# Patient Record
Sex: MALE | Race: WHITE | Employment: FULL TIME | URBAN - NONMETROPOLITAN AREA
[De-identification: names, ages, dates, MRNs, and addresses within clinical notes are randomized per-mention and may not be internally consistent; named-entity substitution may affect disease eponyms.]

---

## 2017-01-03 DIAGNOSIS — E11.9 TYPE 2 DIABETES MELLITUS WITHOUT COMPLICATION, UNSPECIFIED LONG TERM INSULIN USE STATUS: Primary | ICD-10-CM

## 2017-01-03 RX ORDER — GLIPIZIDE 5 MG/1
TABLET ORAL
Qty: 60 TABLET | Refills: 3 | Status: SHIPPED | OUTPATIENT
Start: 2017-01-03 | End: 2017-05-16

## 2017-01-03 NOTE — TELEPHONE ENCOUNTER
glipiZIDE (GLUCOTROL) 5 MG tablet         Last Written Prescription Date: 8/29/16  Last Fill Quantity: 60, # refills: 3  Last Office Visit with [facility], P or [facility] prescribing provider:  7/1/16   Next 5 appointments (look out 90 days)     Jan 11, 2017  7:00 AM   Office Visit with Bill Panda MD   [facility] ([facility])    [address] [facility] [id number] [phone]                   BP Readings from Last 3 Encounters:   07/01/16 122/80   05/18/16 128/78   11/09/15 108/82     MICROL       13   5/18/2016  No results found for this basename: microalbumin  CREATININE   Date Value Ref Range Status   05/18/2016 0.73 0.66 - 1.25 mg/dL Final   ]  GFR ESTIMATE   Date Value Ref Range Status   05/18/2016 >90  Non  GFR Calc   >60 mL/min/1.7m2 Final   12/30/2014 >90  Non  GFR Calc   >60 mL/min/1.7m2 Final   01/06/2014 70 >60 mL/min/1.7m2 Final     GFR ESTIMATE IF BLACK   Date Value Ref Range Status   05/18/2016 >90   GFR Calc   >60 mL/min/1.7m2 Final   12/30/2014 >90   GFR Calc   >60 mL/min/1.7m2 Final   01/06/2014 85 >60 mL/min/1.7m2 Final     CHOL      209   3/30/2009  HDL       29   3/30/2009  LDL      122   5/18/2016  LDL      145   3/30/2009  TRIG      174   3/30/2009  CHOLHDLRATIO      7.0   3/30/2009  AST       12   5/18/2016  ALT       52   5/18/2016  A1C      9.7   5/18/2016  A1C      7.0   6/9/2015  A1C      7.8   12/30/2014  A1C      8.6   8/1/2014  A1C      6.9   1/6/2014  POTASSIUM   Date Value Ref Range Status   05/18/2016 4.2 3.4 - 5.3 mmol/L Final

## 2017-01-03 NOTE — TELEPHONE ENCOUNTER
Routing refill request to provider for review/approval because:  Labs out of range:  LDL  Labs not current:  A1C    Lillian Harper RN  [facility]

## 2017-01-10 DIAGNOSIS — E11.9 TYPE 2 DIABETES MELLITUS WITHOUT COMPLICATION, WITHOUT LONG-TERM CURRENT USE OF INSULIN (H): Primary | ICD-10-CM

## 2017-01-11 ENCOUNTER — OFFICE VISIT (OUTPATIENT)
Dept: FAMILY MEDICINE | Facility: OTHER | Age: 52
End: 2017-01-11
Payer: COMMERCIAL

## 2017-01-11 VITALS
BODY MASS INDEX: 32.61 KG/M2 | OXYGEN SATURATION: 99 % | DIASTOLIC BLOOD PRESSURE: 72 MMHG | WEIGHT: 220.2 LBS | TEMPERATURE: 97.8 F | SYSTOLIC BLOOD PRESSURE: 114 MMHG | HEIGHT: 69 IN | RESPIRATION RATE: 18 BRPM | HEART RATE: 91 BPM

## 2017-01-11 DIAGNOSIS — E11.9 TYPE 2 DIABETES MELLITUS WITHOUT COMPLICATION, WITHOUT LONG-TERM CURRENT USE OF INSULIN (H): Primary | ICD-10-CM

## 2017-01-11 DIAGNOSIS — E78.5 HYPERLIPIDEMIA LDL GOAL <100: ICD-10-CM

## 2017-01-11 LAB — HBA1C MFR BLD: 6 % (ref 4.3–6)

## 2017-01-11 PROCEDURE — 99213 OFFICE O/P EST LOW 20 MIN: CPT | Performed by: FAMILY MEDICINE

## 2017-01-11 PROCEDURE — 36415 COLL VENOUS BLD VENIPUNCTURE: CPT | Performed by: FAMILY MEDICINE

## 2017-01-11 PROCEDURE — 99207 C FOOT EXAM  NO CHARGE: CPT | Performed by: FAMILY MEDICINE

## 2017-01-11 PROCEDURE — 83036 HEMOGLOBIN GLYCOSYLATED A1C: CPT | Performed by: FAMILY MEDICINE

## 2017-01-11 ASSESSMENT — PAIN SCALES - GENERAL: PAINLEVEL: NO PAIN (0)

## 2017-01-11 NOTE — PROGRESS NOTES
SUBJECTIVE:  Zeus Gaona, a 51-year-old man with diabetes, is in for followup.  He denies any symptoms or problems.  He feels his blood sugars have been acceptable.  He is on glipizide 5 mg twice daily, but he does volunteer that he really has been only taking it once today.  He is also on maximum Metformin 1000 mg twice daily.  He did have a colonoscopy a year or so ago which did show adenomatous polyp and he understands that should be repeated on a 5-year interim.  His LDL last spring was 122.      PLAN:  I suggest he return again in 2017 at which time we will repeat an A1c and lipids.  His A1c today comes back excellent at 6.  He will stay on his current medications which really would be once a day glipizide.  We will notify him of other lab results as they become available.         ZACKERY NG M.D.             D: 2017 10:43   T: 2017 12:30   MT:       Name:     ZEUS GAONA   MRN:      [id number]        Account:      [id number]   :      1965           Visit Date:   2017      Document: [id number]

## 2017-01-11 NOTE — NURSING NOTE
"Chief Complaint   Patient presents with     Diabetes     Lipids       Initial /72 mmHg  Pulse 91  Temp(Src) 97.8  F (36.6  C) (Temporal)  Resp 18  Ht 5' 8.75\" (1.746 m)  Wt 220 lb 3.2 oz (99.882 kg)  BMI 32.76 kg/m2  SpO2 99% Estimated body mass index is 32.76 kg/(m^2) as calculated from the following:    Height as of this encounter: 5' 8.75\" (1.746 m).    Weight as of this encounter: 220 lb 3.2 oz (99.882 kg).  BP completed using cuff size: large    "

## 2017-01-11 NOTE — MR AVS SNAPSHOT
"              After Visit Summary   2017    Tim Gaona    MRN: [id number]           Patient Information     Date Of Birth          1965        Visit Information        Provider Department      2017 7:00 AM Bill Panda MD [facility]        Today's Diagnoses     Type 2 diabetes mellitus without complication, without long-term current use of insulin (H)    -  1     Hyperlipidemia LDL goal <100            Follow-ups after your visit        Who to contact     If you have questions or need follow up information about today's clinic visit or your schedule please contact [facility] directly at [phone].  Normal or non-critical lab and imaging results will be communicated to you by [employer], letter or phone within 4 business days after the clinic has received the results. If you do not hear from us within 7 days, please contact the clinic through [employer] or phone. If you have a critical or abnormal lab result, we will notify you by phone as soon as possible.  Submit refill requests through [employer] or call your pharmacy and they will forward the refill request to us. Please allow 3 business days for your refill to be completed.          Additional Information About Your Visit        MyChart Information     [employer] lets you send messages to your doctor, view your test results, renew your prescriptions, schedule appointments and more. To sign up, go to [email or web address] . Click on \"Log in\" on the left side of the screen, which will take you to the Welcome page. Then click on \"Sign up Now\" on the right side of the page.     You will be asked to enter the access code listed below, as well as some personal information. Please follow the directions to create your username and password.     Your access code is: [email or web address]  Expires: 2017  8:10 AM     Your access code will  in 90 days. If you need help or a new code, please call your [facility] or " "[phone].        Care EveryWhere ID     This is your Care EveryWhere ID. This could be used by other organizations to access your [place] medical records  [id number]        Your Vitals Were     Pulse Temperature Respirations Height BMI (Body Mass Index) Pulse Oximetry    91 97.8  F (36.6  C) (Temporal) 18 5' 8.75\" (1.746 m) 32.76 kg/m2 99%       Blood Pressure from Last 3 Encounters:   01/11/17 114/72   07/01/16 122/80   05/18/16 128/78    Weight from Last 3 Encounters:   01/11/17 220 lb 3.2 oz (99.882 kg)   07/01/16 210 lb 0.8 oz (95.278 kg)   05/18/16 214 lb 1.6 oz (97.115 kg)              We Performed the Following     Hemoglobin A1c        Primary Care Provider Office Phone # Fax #    Bill Panda [phone]       [place] [address] [facility] [address]        Thank you!     Thank you for choosing [facility]  for your care. Our goal is always to provide you with excellent care. Hearing back from our patients is one way we can continue to improve our services. Please take a few minutes to complete the written survey that you may receive in the mail after your visit with us. Thank you!             Your Updated Medication List - Protect others around you: Learn how to safely use, store and throw away your medicines at www.disposemymeds.org.          This list is accurate as of: 1/11/17  8:10 AM.  Always use your most recent med list.                   Brand Name Dispense Instructions for use    ASPIRIN PO      Take 81 mg by mouth daily       blood glucose monitoring lancets     1 Box    Use to test blood sugar one time daily or as directed.       blood glucose monitoring test strip    no brand specified    100 strip    Use to test blood sugars one time daily or as directed       glipiZIDE 5 MG tablet    GLUCOTROL    60 tablet    TAKE ONE TABLET BY MOUTH TWICE A DAY BEFORE MEALS       metFORMIN 1000 MG tablet    GLUCOPHAGE    180 tablet    TAKE " ONE TABLET BY MOUTH TWICE A DAY WITH MEALS (NEED OFFICE VISIT SCHEDULED BEFORE FURTHER REFILLS)       nicotine 21 MG/24HR 24 hr patch    NICODERM CQ    30 patch    Place 1 patch onto the skin every 24 hours       STATIN NOT PRESCRIBED (INTENTIONAL)      1 each See Admin Instructions       VITAMIN B COMPLEX PO      Take 1 tablet by mouth daily       Vitamin C 500 MG Caps      Take 1 capsule by mouth daily       vitamin E 400 UNIT capsule      Take 1 capsule by mouth daily

## 2017-01-11 NOTE — PROGRESS NOTES
"SUBJECTIVE:                                                    Tim Gaona is a 51 year old male who presents to clinic today for the following health issues:    Diabetes Follow-up    Patient is checking blood sugars: once daily.  Results are as follows:         130    Diabetic concerns: None     Symptoms of hypoglycemia (low blood sugar): none     Paresthesias (numbness or burning in feet) or sores: No     Date of last diabetic eye exam: not sure     Hyperlipidemia Follow-Up      Rate your low fat/cholesterol diet?: fair    Taking statin?  No    Other lipid medications/supplements?:  none       Amount of exercise or physical activity: 2-3 days/week for an average of 15-30 minutes    Problems taking medications regularly: No    Medication side effects: none    Diet: low fat/cholesterol diabetic    Problem list and histories reviewed & adjusted, as indicated.    C: NEGATIVE for fever, chills, change in weight  E/M: NEGATIVE for ear, mouth and throat problems  R: NEGATIVE for significant cough or SOB  CV: NEGATIVE for chest pain, palpitations or peripheral edema    OBJECTIVE:                                                    /72 mmHg  Pulse 91  Temp(Src) 97.8  F (36.6  C) (Temporal)  Resp 18  Ht 5' 8.75\" (1.746 m)  Wt 220 lb 3.2 oz (99.882 kg)  BMI 32.76 kg/m2  SpO2 99%  Body mass index is 32.76 kg/(m^2).    See dictated note     ASSESSMENT/PLAN:                                                        Bill Panda MD, MD  [facility]          "

## 2017-01-31 DIAGNOSIS — E11.9 TYPE 2 DIABETES MELLITUS WITHOUT COMPLICATION, WITHOUT LONG-TERM CURRENT USE OF INSULIN (H): Primary | ICD-10-CM

## 2017-01-31 NOTE — TELEPHONE ENCOUNTER
Glipizide         Last Written Prescription Date: 1/3/17  Last Fill Quantity: 60, # refills: 3  Last Office Visit with G, P or [facility] prescribing provider:  1/11/17        BP Readings from Last 3 Encounters:   01/11/17 114/72   07/01/16 122/80   05/18/16 128/78     MICROL       13   5/18/2016  No results found for this basename: microalbumin  CREATININE   Date Value Ref Range Status   05/18/2016 0.73 0.66 - 1.25 mg/dL Final   ]  GFR ESTIMATE   Date Value Ref Range Status   05/18/2016 >90  Non  GFR Calc   >60 mL/min/1.7m2 Final   12/30/2014 >90  Non  GFR Calc   >60 mL/min/1.7m2 Final   01/06/2014 70 >60 mL/min/1.7m2 Final     GFR ESTIMATE IF BLACK   Date Value Ref Range Status   05/18/2016 >90   GFR Calc   >60 mL/min/1.7m2 Final   12/30/2014 >90   GFR Calc   >60 mL/min/1.7m2 Final   01/06/2014 85 >60 mL/min/1.7m2 Final     CHOL      209   3/30/2009  HDL       29   3/30/2009  LDL      122   5/18/2016  LDL      145   3/30/2009  TRIG      174   3/30/2009  CHOLHDLRATIO      7.0   3/30/2009  AST       12   5/18/2016  ALT       52   5/18/2016  A1C      6.0   1/11/2017  A1C      9.7   5/18/2016  A1C      7.0   6/9/2015  A1C      7.8   12/30/2014  A1C      8.6   8/1/2014  POTASSIUM   Date Value Ref Range Status   05/18/2016 4.2 3.4 - 5.3 mmol/L Final   Luzmaria Ledezma MA     1/31/2017

## 2017-02-02 RX ORDER — GLIPIZIDE 5 MG/1
TABLET ORAL
Qty: 60 TABLET | Refills: 3 | OUTPATIENT
Start: 2017-02-02

## 2017-02-02 NOTE — TELEPHONE ENCOUNTER
Prescription was sent 1/3/17 for #60 with 3 refills.  Pharmacy notified via E-Prescribe refusal.     Lillian Harper RN  [facility]

## 2017-05-16 DIAGNOSIS — E11.9 TYPE 2 DIABETES MELLITUS WITHOUT COMPLICATION, UNSPECIFIED LONG TERM INSULIN USE STATUS: ICD-10-CM

## 2017-05-17 RX ORDER — GLIPIZIDE 5 MG/1
TABLET ORAL
Qty: 60 TABLET | Refills: 3 | Status: SHIPPED | OUTPATIENT
Start: 2017-05-17 | End: 2017-09-11

## 2017-05-17 NOTE — TELEPHONE ENCOUNTER
Routing refill request to provider for review/approval because:  Labs out of range:  LDL    Lillian Harper, RN  [facility]

## 2017-05-17 NOTE — TELEPHONE ENCOUNTER
Glipizide         Last Written Prescription Date: 1/3/17  Last Fill Quantity: 60, # refills: 3  Last Office Visit with G, P or [facility] prescribing provider:  1/11/17        BP Readings from Last 3 Encounters:   01/11/17 114/72   07/01/16 122/80   05/18/16 128/78     Lab Results   Component Value Date    MICROL 13 05/18/2016     Lab Results   Component Value Date    UMALCR 10.75 05/18/2016     Creatinine   Date Value Ref Range Status   05/18/2016 0.73 0.66 - 1.25 mg/dL Final   ]  GFR Estimate   Date Value Ref Range Status   05/18/2016 >90  Non  GFR Calc   >60 mL/min/1.7m2 Final   12/30/2014 >90  Non  GFR Calc   >60 mL/min/1.7m2 Final   01/06/2014 70 >60 mL/min/1.7m2 Final     GFR Estimate If Black   Date Value Ref Range Status   05/18/2016 >90   GFR Calc   >60 mL/min/1.7m2 Final   12/30/2014 >90   GFR Calc   >60 mL/min/1.7m2 Final   01/06/2014 85 >60 mL/min/1.7m2 Final     Lab Results   Component Value Date    CHOL 209 03/30/2009     Lab Results   Component Value Date    HDL 29 03/30/2009     Lab Results   Component Value Date     05/18/2016     03/30/2009     Lab Results   Component Value Date    TRIG 174 03/30/2009     Lab Results   Component Value Date    CHOLHDLRATIO 7.0 03/30/2009     Lab Results   Component Value Date    AST 12 05/18/2016     Lab Results   Component Value Date    ALT 52 05/18/2016     Lab Results   Component Value Date    A1C 6.0 01/11/2017    A1C 9.7 05/18/2016    A1C 7.0 06/09/2015    A1C 7.8 12/30/2014    A1C 8.6 08/01/2014     Potassium   Date Value Ref Range Status   05/18/2016 4.2 3.4 - 5.3 mmol/L Final

## 2017-05-31 DIAGNOSIS — E11.9 CONTROLLED TYPE 2 DIABETES MELLITUS WITHOUT COMPLICATION, WITHOUT LONG-TERM CURRENT USE OF INSULIN (H): Primary | ICD-10-CM

## 2017-06-01 NOTE — TELEPHONE ENCOUNTER
Metformin          Last Written Prescription Date: 5/18/2016  Last Fill Quantity: 180, # refills: 3  Last Office Visit with [facility] or [facility] prescribing provider:  1/11/2017        BP Readings from Last 3 Encounters:   01/11/17 114/72   07/01/16 122/80   05/18/16 128/78     Lab Results   Component Value Date    MICROL 13 05/18/2016     Lab Results   Component Value Date    UMALCR 10.75 05/18/2016     Creatinine   Date Value Ref Range Status   05/18/2016 0.73 0.66 - 1.25 mg/dL Final   ]  GFR Estimate   Date Value Ref Range Status   05/18/2016 >90  Non  GFR Calc   >60 mL/min/1.7m2 Final   12/30/2014 >90  Non  GFR Calc   >60 mL/min/1.7m2 Final   01/06/2014 70 >60 mL/min/1.7m2 Final     GFR Estimate If Black   Date Value Ref Range Status   05/18/2016 >90   GFR Calc   >60 mL/min/1.7m2 Final   12/30/2014 >90   GFR Calc   >60 mL/min/1.7m2 Final   01/06/2014 85 >60 mL/min/1.7m2 Final     Lab Results   Component Value Date    CHOL 209 03/30/2009     Lab Results   Component Value Date    HDL 29 03/30/2009     Lab Results   Component Value Date     05/18/2016     03/30/2009     Lab Results   Component Value Date    TRIG 174 03/30/2009     Lab Results   Component Value Date    CHOLHDLRATIO 7.0 03/30/2009     Lab Results   Component Value Date    AST 12 05/18/2016     Lab Results   Component Value Date    ALT 52 05/18/2016     Lab Results   Component Value Date    A1C 6.0 01/11/2017    A1C 9.7 05/18/2016    A1C 7.0 06/09/2015    A1C 7.8 12/30/2014    A1C 8.6 08/01/2014     Potassium   Date Value Ref Range Status   05/18/2016 4.2 3.4 - 5.3 mmol/L Final

## 2017-06-02 NOTE — TELEPHONE ENCOUNTER
Metformin  Routing refill request to provider for review/approval because:  Labs out of range:  LDL  Labs not current:  Creatinine, GFR,  LDL, Microalbumin--future labs ordered  Patient needs to be seen because:  Per last OV 1/11/17, pt was to return in May.  No f/up done--routing to schedulers also    Felipe Monterroso RN, BSN

## 2017-06-05 ENCOUNTER — OFFICE VISIT (OUTPATIENT)
Dept: FAMILY MEDICINE | Facility: OTHER | Age: 52
End: 2017-06-05
Payer: COMMERCIAL

## 2017-06-05 VITALS
TEMPERATURE: 97.1 F | HEART RATE: 88 BPM | BODY MASS INDEX: 33.05 KG/M2 | SYSTOLIC BLOOD PRESSURE: 124 MMHG | RESPIRATION RATE: 16 BRPM | WEIGHT: 222.2 LBS | DIASTOLIC BLOOD PRESSURE: 68 MMHG | OXYGEN SATURATION: 98 %

## 2017-06-05 DIAGNOSIS — E11.9 CONTROLLED TYPE 2 DIABETES MELLITUS WITHOUT COMPLICATION, WITHOUT LONG-TERM CURRENT USE OF INSULIN (H): Primary | ICD-10-CM

## 2017-06-05 PROCEDURE — 99213 OFFICE O/P EST LOW 20 MIN: CPT | Performed by: FAMILY MEDICINE

## 2017-06-05 ASSESSMENT — PAIN SCALES - GENERAL: PAINLEVEL: SEVERE PAIN (6)

## 2017-06-05 NOTE — PROGRESS NOTES
SUBJECTIVE:                                                    Tim Gaona is a 52 year old male who presents to clinic today for the following health issues:    Diabetes Follow-up      Patient is checking blood sugars: normally twice a day but he has not done it for the last three weeks because he lost his meter    Diabetic concerns: None     Symptoms of hypoglycemia (low blood sugar): none     Paresthesias (numbness or burning in feet) or sores: No     Date of last diabetic eye exam: never had one     Hyperlipidemia Follow-Up      Rate your low fat/cholesterol diet?: fair    Taking statin?  No    Other lipid medications/supplements?:  none       Amount of exercise or physical activity: 2-3 days/week for an average of greater than 60 minutes    Problems taking medications regularly: No    Medication side effects: none    Diet: carbohydrate counting    Problem list and histories reviewed & adjusted, as indicated.  Additional history: none    Patient Active Problem List   Diagnosis     Hyperlipidemia LDL goal <100     Obesity, Class I, BMI 30-34.9     Tobacco use disorder     Controlled type 2 diabetes mellitus without complication, without long-term current use of insulin (H)     Past Surgical History:   Procedure Laterality Date     HC REMOVE TONSILS/ADENOIDS,<11 Y/O  1971    T & A <12y.o.       Social History   Substance Use Topics     Smoking status: Current Every Day Smoker     Packs/day: 0.75     Years: 32.00     Types: Cigarettes     Smokeless tobacco: Never Used      Comment: started age 20     Alcohol use No     Family History   Problem Relation Age of Onset     Thyroid Disease Mother      HEART DISEASE Father      Lipids Father      Thyroid Disease Paternal Grandmother      Thyroid Disease Paternal Grandfather            Reviewed and updated as needed this visit by clinical staff  Tobacco  Allergies  Meds  Soc Hx      Reviewed and updated as needed this visit by Provider         ROS:  C: NEGATIVE  for fever, chills, change in weight  E/M: NEGATIVE for ear, mouth and throat problems  R: NEGATIVE for significant cough or SOB  CV: NEGATIVE for chest pain, palpitations or peripheral edema    OBJECTIVE:                                                    /68 (BP Location: Right arm, Patient Position: Chair, Cuff Size: Adult Regular)  Pulse 88  Temp 97.1  F (36.2  C) (Tympanic)  Resp 16  Wt 222 lb 3.2 oz (100.8 kg)  SpO2 98%  BMI 33.05 kg/m2  Body mass index is 33.05 kg/(m^2).      Diagnostic Test Results:     ASSESSMENT/PLAN:                                                      dictated              Blil Panda MD, MD  [facility]

## 2017-06-05 NOTE — MR AVS SNAPSHOT
"              After Visit Summary   6/5/2017    Tim Gaona    MRN: [id number]           Patient Information     Date Of Birth          1965        Visit Information        Provider Department      6/5/2017 4:00 PM Bill Panda MD [facility]        Today's Diagnoses     Controlled type 2 diabetes mellitus without complication, without long-term current use of insulin (H)    -  1       Follow-ups after your visit        Future tests that were ordered for you today     Open Future Orders        Priority Expected Expires Ordered    **A1C FUTURE 3mo Routine 9/3/2017 10/3/2017 6/5/2017            Who to contact     If you have questions or need follow up information about today's clinic visit or your schedule please contact [facility] directly at [phone].  Normal or non-critical lab and imaging results will be communicated to you by MyChart, letter or phone within 4 business days after the clinic has received the results. If you do not hear from us within 7 days, please contact the clinic through MyChart or phone. If you have a critical or abnormal lab result, we will notify you by phone as soon as possible.  Submit refill requests through [facility] or call your pharmacy and they will forward the refill request to us. Please allow 3 business days for your refill to be completed.          Additional Information About Your Visit        [employer] Information     [facility] lets you send messages to your doctor, view your test results, renew your prescriptions, schedule appointments and more. To sign up, go to [email or web address] . Click on \"Log in\" on the left side of the screen, which will take you to the Welcome page. Then click on \"Sign up Now\" on the right side of the page.     You will be asked to enter the access code listed below, as well as some personal information. Please follow the directions to create your username and password.     Your access code is: " [email or web address]  Expires: 9/3/2017  5:34 PM     Your access code will  in 90 days. If you need help or a new code, please call your [facility] or [phone].        Care EveryWhere ID     This is your Care EveryWhere ID. This could be used by other organizations to access your [place] medical records  [id number]        Your Vitals Were     Pulse Temperature Respirations Pulse Oximetry BMI (Body Mass Index)       88 97.1  F (36.2  C) (Tympanic) 16 98% 33.05 kg/m2        Blood Pressure from Last 3 Encounters:   17 124/68   17 114/72   16 122/80    Weight from Last 3 Encounters:   17 222 lb 3.2 oz (100.8 kg)   17 220 lb 3.2 oz (99.9 kg)   16 210 lb 0.8 oz (95.3 kg)                 Today's Medication Changes          These changes are accurate as of: 17  5:34 PM.  If you have any questions, ask your nurse or doctor.               Start taking these medicines.        Dose/Directions    blood glucose monitoring meter device kit   Commonly known as:  no brand specified   Used for:  Controlled type 2 diabetes mellitus without complication, without long-term current use of insulin (H)   Started by:  Bill Panda MD        Use to test blood sugar 2 times daily or as directed.   Quantity:  1 kit   Refills:  0            Where to get your medicines      These medications were sent to [address] [place], [place] - [address] [place] [id number]     Phone:  [phone]     blood glucose monitoring meter device kit                Primary Care Provider Office Phone # Fax #    Bill Panda [phone]       [facility] [address] [facility] [address]        Thank you!     Thank you for choosing [facility]  for your care. Our goal is always to provide you with excellent care. Hearing back from our patients is one way we can continue to improve our services. Please take a few minutes to complete  the written survey that you may receive in the mail after your visit with us. Thank you!             Your Updated Medication List - Protect others around you: Learn how to safely use, store and throw away your medicines at www.disposemymeds.org.          This list is accurate as of: 6/5/17  5:34 PM.  Always use your most recent med list.                   Brand Name Dispense Instructions for use    ASPIRIN PO      Take 81 mg by mouth daily       blood glucose monitoring lancets     1 Box    Use to test blood sugar one time daily or as directed.       blood glucose monitoring meter device kit    no brand specified    1 kit    Use to test blood sugar 2 times daily or as directed.       blood glucose monitoring test strip    no brand specified    100 strip    Use to test blood sugars one time daily or as directed       glipiZIDE 5 MG tablet    GLUCOTROL    60 tablet    TAKE ONE TABLET BY MOUTH TWICE A DAY BEFORE MEALS       metFORMIN 1000 MG tablet    GLUCOPHAGE    180 tablet    TAKE ONE TABLET BY MOUTH TWICE A DAY WITH MEALS       nicotine 21 MG/24HR 24 hr patch    NICODERM CQ    30 patch    Place 1 patch onto the skin every 24 hours       STATIN NOT PRESCRIBED (INTENTIONAL)      1 each See Admin Instructions       VITAMIN B COMPLEX PO      Take 1 tablet by mouth daily       Vitamin C 500 MG Caps      Take 1 capsule by mouth daily       vitamin E 400 UNIT capsule      Take 1 capsule by mouth daily

## 2017-06-05 NOTE — NURSING NOTE
"Chief Complaint   Patient presents with     Diabetes     Lipids       Initial /68 (BP Location: Right arm, Patient Position: Chair, Cuff Size: Adult Regular)  Pulse 88  Temp 97.1  F (36.2  C) (Tympanic)  Resp 16  Wt 222 lb 3.2 oz (100.8 kg)  SpO2 98%  BMI 33.05 kg/m2 Estimated body mass index is 33.05 kg/(m^2) as calculated from the following:    Height as of 1/11/17: 5' 8.75\" (1.746 m).    Weight as of this encounter: 222 lb 3.2 oz (100.8 kg).  Medication Reconciliation: complete     Health Maintenance Due   Topic Date Due     PNEUMOVAX 1X HI RISK PATIENT < 65 (NO IB MSG)  04/13/1967     HEPATITIS C SCREENING  04/13/1983     CREATININE Q1 YEAR  05/18/2017     LIPID MONITORING Q1 YEAR  05/18/2017     MICROALBUMIN Q1 YEAR  05/18/2017     Leilani Montemayor CMA      "

## 2017-06-06 NOTE — PROGRESS NOTES
SUBJECTIVE:  Zeus Gaona, 52-year-old man in for followup of his diabetes.  Unfortunately, he continues to smoke.  His only complaint has been a litle sinus type pressure and drainage over the last month or so, I recommend some guaifenesin.  He says he has lost his Accu-Chek machine, it has been old, has had it for a long time.  He does have strips and lancets, will reorder Accu-Chek hopefully his insurance will cover it.  In the meantime, he is due for some labs, although his A1c is not due for another month or 2, he agrees to come in fasting in a month and we will do labs including his A1c.      OBJECTIVE:  His heart is regular.  Lungs are clear.  No carotid bruits.  He looks healthy.      PLAN:  Will stay on current treatments.         ZACKERY NG M.D.             D: 2017 17:33   T: 2017 09:14   MT: [id number]      Name:     ZEUS GAONA   MRN:      [id number]        Account:      [id number]   :      1965           Visit Date:   2017      Document: [id number]

## 2017-09-11 DIAGNOSIS — E11.9 TYPE 2 DIABETES MELLITUS WITHOUT COMPLICATION, UNSPECIFIED LONG TERM INSULIN USE STATUS: ICD-10-CM

## 2017-09-12 RX ORDER — GLIPIZIDE 5 MG/1
TABLET ORAL
Qty: 60 TABLET | Refills: 3 | Status: SHIPPED | OUTPATIENT
Start: 2017-09-12 | End: 2018-01-22

## 2017-09-12 NOTE — TELEPHONE ENCOUNTER
Glipizide         Last Written Prescription Date: 5/17/17  Last Fill Quantity: 60, # refills: 3  Last Office Visit with G, [facility] or [facility] prescribing provider:  6/5/17        BP Readings from Last 3 Encounters:   06/05/17 124/68   01/11/17 114/72   07/01/16 122/80     Lab Results   Component Value Date    MICROL 13 05/18/2016     Lab Results   Component Value Date    UMALCR 10.75 05/18/2016     Creatinine   Date Value Ref Range Status   05/18/2016 0.73 0.66 - 1.25 mg/dL Final   ]  GFR Estimate   Date Value Ref Range Status   05/18/2016 >90  Non  GFR Calc   >60 mL/min/1.7m2 Final   12/30/2014 >90  Non  GFR Calc   >60 mL/min/1.7m2 Final   01/06/2014 70 >60 mL/min/1.7m2 Final     GFR Estimate If Black   Date Value Ref Range Status   05/18/2016 >90   GFR Calc   >60 mL/min/1.7m2 Final   12/30/2014 >90   GFR Calc   >60 mL/min/1.7m2 Final   01/06/2014 85 >60 mL/min/1.7m2 Final     Lab Results   Component Value Date    CHOL 209 03/30/2009     Lab Results   Component Value Date    HDL 29 03/30/2009     Lab Results   Component Value Date     05/18/2016     03/30/2009     Lab Results   Component Value Date    TRIG 174 03/30/2009     Lab Results   Component Value Date    CHOLHDLRATIO 7.0 03/30/2009     Lab Results   Component Value Date    AST 12 05/18/2016     Lab Results   Component Value Date    ALT 52 05/18/2016     Lab Results   Component Value Date    A1C 6.0 01/11/2017    A1C 9.7 05/18/2016    A1C 7.0 06/09/2015    A1C 7.8 12/30/2014    A1C 8.6 08/01/2014     Potassium   Date Value Ref Range Status   05/18/2016 4.2 3.4 - 5.3 mmol/L Final

## 2017-09-12 NOTE — TELEPHONE ENCOUNTER
Routing refill request to provider for review/approval because:  Labs out of range:  LDL  Labs not current:  Creatinine, LDL, Microalbumin. A1C    Lillian Harper RN  [facility]

## 2017-09-20 ENCOUNTER — OFFICE VISIT (OUTPATIENT)
Dept: FAMILY MEDICINE | Facility: OTHER | Age: 52
End: 2017-09-20
Payer: COMMERCIAL

## 2017-09-20 VITALS
DIASTOLIC BLOOD PRESSURE: 82 MMHG | HEART RATE: 76 BPM | TEMPERATURE: 97.8 F | OXYGEN SATURATION: 100 % | BODY MASS INDEX: 33.86 KG/M2 | RESPIRATION RATE: 16 BRPM | HEIGHT: 68 IN | WEIGHT: 223.4 LBS | SYSTOLIC BLOOD PRESSURE: 115 MMHG

## 2017-09-20 DIAGNOSIS — E11.9 CONTROLLED TYPE 2 DIABETES MELLITUS WITHOUT COMPLICATION, WITHOUT LONG-TERM CURRENT USE OF INSULIN (H): ICD-10-CM

## 2017-09-20 DIAGNOSIS — E78.5 HYPERLIPIDEMIA LDL GOAL <100: Primary | ICD-10-CM

## 2017-09-20 DIAGNOSIS — Z11.59 ENCOUNTER FOR HEPATITIS C SCREENING TEST FOR LOW RISK PATIENT: ICD-10-CM

## 2017-09-20 LAB
ALBUMIN SERPL-MCNC: 3.8 G/DL (ref 3.4–5)
ALP SERPL-CCNC: 74 U/L (ref 40–150)
ALT SERPL W P-5'-P-CCNC: 23 U/L (ref 0–70)
ANION GAP SERPL CALCULATED.3IONS-SCNC: 12 MMOL/L (ref 3–14)
AST SERPL W P-5'-P-CCNC: 9 U/L (ref 0–45)
BILIRUB SERPL-MCNC: 0.4 MG/DL (ref 0.2–1.3)
BUN SERPL-MCNC: 11 MG/DL (ref 7–30)
CALCIUM SERPL-MCNC: 8.6 MG/DL (ref 8.5–10.1)
CHLORIDE SERPL-SCNC: 106 MMOL/L (ref 94–109)
CHOLEST SERPL-MCNC: 179 MG/DL
CO2 SERPL-SCNC: 24 MMOL/L (ref 20–32)
CREAT SERPL-MCNC: 0.73 MG/DL (ref 0.66–1.25)
CREAT UR-MCNC: 66 MG/DL
GFR SERPL CREATININE-BSD FRML MDRD: >90 ML/MIN/1.7M2
GLUCOSE SERPL-MCNC: 199 MG/DL (ref 70–99)
HBA1C MFR BLD: 6.5 % (ref 4.3–6)
HDLC SERPL-MCNC: 35 MG/DL
LDLC SERPL CALC-MCNC: 116 MG/DL
MICROALBUMIN UR-MCNC: <5 MG/L
MICROALBUMIN/CREAT UR: NORMAL MG/G CR (ref 0–17)
NONHDLC SERPL-MCNC: 144 MG/DL
POTASSIUM SERPL-SCNC: 4.1 MMOL/L (ref 3.4–5.3)
PROT SERPL-MCNC: 7 G/DL (ref 6.8–8.8)
SODIUM SERPL-SCNC: 142 MMOL/L (ref 133–144)
TRIGL SERPL-MCNC: 142 MG/DL
TSH SERPL DL<=0.005 MIU/L-ACNC: 1.88 MU/L (ref 0.4–4)

## 2017-09-20 PROCEDURE — 86803 HEPATITIS C AB TEST: CPT | Performed by: FAMILY MEDICINE

## 2017-09-20 PROCEDURE — 80053 COMPREHEN METABOLIC PANEL: CPT | Performed by: FAMILY MEDICINE

## 2017-09-20 PROCEDURE — 36415 COLL VENOUS BLD VENIPUNCTURE: CPT | Performed by: FAMILY MEDICINE

## 2017-09-20 PROCEDURE — 82043 UR ALBUMIN QUANTITATIVE: CPT | Performed by: FAMILY MEDICINE

## 2017-09-20 PROCEDURE — 99214 OFFICE O/P EST MOD 30 MIN: CPT | Performed by: FAMILY MEDICINE

## 2017-09-20 PROCEDURE — 84443 ASSAY THYROID STIM HORMONE: CPT | Performed by: FAMILY MEDICINE

## 2017-09-20 PROCEDURE — 80061 LIPID PANEL: CPT | Performed by: FAMILY MEDICINE

## 2017-09-20 PROCEDURE — 83036 HEMOGLOBIN GLYCOSYLATED A1C: CPT | Performed by: FAMILY MEDICINE

## 2017-09-20 ASSESSMENT — PAIN SCALES - GENERAL: PAINLEVEL: NO PAIN (0)

## 2017-09-20 NOTE — PROGRESS NOTES
"SUBJECTIVE:                                                    Tim Gaona is a 52 year old male who presents to clinic today for the following health issues:    Diabetes Follow-up    Patient is checking blood sugars: twice daily. Per patient   Blood sugar testing frequency justification: per patient, just does  Results are as follows:       am - 130-140       5pm /8pm  100-120        Diabetic concerns: None     Symptoms of hypoglycemia (low blood sugar): none     Paresthesias (numbness or burning in feet) or sores: No   Date of last diabetic eye exam: none  Hyperlipidemia Follow-Up      Rate your low fat/cholesterol diet?: good    Taking statin?  No    Other lipid medications/supplements?:  none          Amount of exercise or physical activity: 2-3 days/week for an average of 45-60 minutes    Problems taking medications regularly: No    Medication side effects: none  Diet: low fat/cholesterol and carbohydrate counting      Problem list and histories reviewed & adjusted, as indicated.    C: NEGATIVE for fever, chills, change in weight  E/M: NEGATIVE for ear, mouth and throat problems  R: NEGATIVE for significant cough or SOB  CV: NEGATIVE for chest pain, palpitations or peripheral edema    OBJECTIVE:                                                    /82 (BP Location: Right arm, Patient Position: Chair, Cuff Size: Adult Large)  Pulse 76  Temp 97.8  F (36.6  C) (Temporal)  Resp 16  Ht 5' 8\" (1.727 m)  Wt 223 lb 6.4 oz (101.3 kg)  SpO2 100%  BMI 33.97 kg/m2  Body mass index is 33.97 kg/(m^2).         ASSESSMENT/PLAN:                                                    Diabetes,controlled    Bill Panda MD, MD  [facility]          "

## 2017-09-20 NOTE — LETTER
September 21, 2017      Tim Gaona  [address]     [place] [facility] [place] [address]        Dear ,    We are writing to inform you of your test results.    Your test results fall within the expected range(s) or remain unchanged from previous results.  Please continue with current treatment plan. Your Hepatitis C Screening results are still pending.     Resulted Orders   Comprehensive metabolic panel   Result Value Ref Range    Sodium 142 133 - 144 mmol/L    Potassium 4.1 3.4 - 5.3 mmol/L    Chloride 106 94 - 109 mmol/L    Carbon Dioxide 24 20 - 32 mmol/L    Anion Gap 12 3 - 14 mmol/L    Glucose 199 (H) 70 - 99 mg/dL      Comment:      Non Fasting    Urea Nitrogen 11 7 - 30 mg/dL    Creatinine 0.73 0.66 - 1.25 mg/dL    GFR Estimate >90 >60 mL/min/1.7m2      Comment:      Non  GFR Calc    GFR Estimate If Black >90 >60 mL/min/1.7m2      Comment:       GFR Calc    Calcium 8.6 8.5 - 10.1 mg/dL    Bilirubin Total 0.4 0.2 - 1.3 mg/dL    Albumin 3.8 3.4 - 5.0 g/dL    Protein Total 7.0 6.8 - 8.8 g/dL    Alkaline Phosphatase 74 40 - 150 U/L    ALT 23 0 - 70 U/L    AST 9 0 - 45 U/L   TSH   Result Value Ref Range    TSH 1.88 0.40 - 4.00 mU/L   Lipid panel reflex to direct LDL   Result Value Ref Range    Cholesterol 179 <200 mg/dL    Triglycerides 142 <150 mg/dL      Comment:      Non Fasting    HDL Cholesterol 35 (L) >39 mg/dL    LDL Cholesterol Calculated 116 (H) <100 mg/dL      Comment:      Above desirable:  100-129 mg/dl  Borderline High:  130-159 mg/dL  High:             160-189 mg/dL  Very high:       >189 mg/dl      Non HDL Cholesterol 144 (H) <130 mg/dL      Comment:      Above Desirable:  130-159 mg/dl  Borderline high:  160-189 mg/dl  High:             190-219 mg/dl  Very high:       >219 mg/dl     Albumin Random Urine Quantitative with Creat Ratio   Result Value Ref Range    Creatinine Urine 66 mg/dL    Albumin Urine mg/L <5 mg/L    Albumin Urine mg/g Cr Unable to calculate  due to low value 0 - 17 mg/g Cr   Hemoglobin A1c   Result Value Ref Range    Hemoglobin A1C 6.5 (H) 4.3 - 6.0 %       If you have any questions or concerns, please call the clinic at the number listed above.       Sincerely,        Bill Panda MD, MD

## 2017-09-20 NOTE — PROGRESS NOTES
SUBJECTIVE:  Mr. Zeus Gaona is a 52-year-old man in for followup of his diabetes.       Lab work is done A1c is good and other labs pending.        He does not voice any health complaints and does not need any refills.  Unfortunately, he still has some occasional smoking.      OBJECTIVE:     HEENT:  Head, eyes, ears, nose and throat appear normal.    LUNGS:  Clear.   HEART:  Regular.     EXTREMITIES:  Denies any numbness or foot problem.      PLAN:  Will stay on current treatment.         ZACKERY NG M.D.             D: 2017 12:23   T: 2017 17:04   MT: [id number]      Name:     ZEUS GAONA   MRN:      [id number]        Account:      [id number]   :      1965           Visit Date:   2017      Document: [id number]

## 2017-09-20 NOTE — MR AVS SNAPSHOT
"              After Visit Summary   2017    Tim Gaona    MRN: [id number]           Patient Information     Date Of Birth          1965        Visit Information        Provider Department      2017 10:45 AM Bill Panda MD [facility]        Today's Diagnoses     Hyperlipidemia LDL goal <100    -  1    Controlled type 2 diabetes mellitus without complication, without long-term current use of insulin (H)        Encounter for hepatitis C screening test for low risk patient           Follow-ups after your visit        Who to contact     If you have questions or need follow up information about today's clinic visit or your schedule please contact [facility] directly at [phone].  Normal or non-critical lab and imaging results will be communicated to you by MyChart, letter or phone within 4 business days after the clinic has received the results. If you do not hear from us within 7 days, please contact the clinic through MyChart or phone. If you have a critical or abnormal lab result, we will notify you by phone as soon as possible.  Submit refill requests through [employer] or call your pharmacy and they will forward the refill request to us. Please allow 3 business days for your refill to be completed.          Additional Information About Your Visit        MyChart Information     [employer] lets you send messages to your doctor, view your test results, renew your prescriptions, schedule appointments and more. To sign up, go to [email or web address] . Click on \"Log in\" on the left side of the screen, which will take you to the Welcome page. Then click on \"Sign up Now\" on the right side of the page.     You will be asked to enter the access code listed below, as well as some personal information. Please follow the directions to create your username and password.     Your access code is: [email or web address]  Expires: 2017 11:27 AM     Your access code will  in 90 " "days. If you need help or a new code, please call your [facility] clinic or [phone].        Care EveryWhere ID     This is your Care EveryWhere ID. This could be used by other organizations to access your [facility] medical records  [id number]        Your Vitals Were     Pulse Temperature Respirations Height Pulse Oximetry BMI (Body Mass Index)    76 97.8  F (36.6  C) (Temporal) 16 5' 8\" (1.727 m) 100% 33.97 kg/m2       Blood Pressure from Last 3 Encounters:   09/20/17 115/82   06/05/17 124/68   01/11/17 114/72    Weight from Last 3 Encounters:   09/20/17 223 lb 6.4 oz (101.3 kg)   06/05/17 222 lb 3.2 oz (100.8 kg)   01/11/17 220 lb 3.2 oz (99.9 kg)              We Performed the Following     **Hepatitis C Screen Reflex to RNA FUTURE anytime     Albumin Random Urine Quantitative with Creat Ratio     Comprehensive metabolic panel     Hemoglobin A1c     Lipid panel reflex to direct LDL     TSH        Primary Care Provider Office Phone # Fax #    Bill Panda [phone]       [address] [facility] [address]        Equal Access to Services     LISA WEEMS AH: Hadii roula romero hadasho Soomaali, waaxda luqadaha, qaybta kaalmada adeegyada, ezequiel sherman. So [facility] [phone].    ATENCIÓN: Si habla español, tiene a dias disposición servicios gratuitos de asistencia lingüística. Llame al [phone].    We comply with applicable federal civil rights laws and [place] laws. We do not discriminate on the basis of race, color, national origin, age, disability sex, sexual orientation or gender identity.            Thank you!     Thank you for choosing [facility]  for your care. Our goal is always to provide you with excellent care. Hearing back from our patients is one way we can continue to improve our services. Please take a few minutes to complete the written survey that you may receive in the mail after your visit with us. Thank you!             Your " Updated Medication List - Protect others around you: Learn how to safely use, store and throw away your medicines at www.disposemymeds.org.          This list is accurate as of: 9/20/17 12:07 PM.  Always use your most recent med list.                   Brand Name Dispense Instructions for use Diagnosis    ASPIRIN PO      Take 81 mg by mouth daily        blood glucose monitoring lancets     1 Box    Use to test blood sugar one time daily or as directed.    Type 2 diabetes mellitus without complication, without long-term current use of insulin (H)       blood glucose monitoring meter device kit    no brand specified    1 kit    Use to test blood sugar 2 times daily or as directed.    Controlled type 2 diabetes mellitus without complication, without long-term current use of insulin (H)       blood glucose monitoring test strip    no brand specified    100 strip    Use to test blood sugars one time daily or as directed    Type 2 diabetes mellitus without complication, without long-term current use of insulin (H)       glipiZIDE 5 MG tablet    GLUCOTROL    60 tablet    TAKE ONE TABLET BY MOUTH TWICE A DAY BEFORE MEALS    Type 2 diabetes mellitus without complication, unspecified long term insulin use status (H)       metFORMIN 1000 MG tablet    GLUCOPHAGE    180 tablet    TAKE ONE TABLET BY MOUTH TWICE A DAY WITH MEALS    Controlled type 2 diabetes mellitus without complication, without long-term current use of insulin (H)       nicotine 21 MG/24HR 24 hr patch    NICODERM CQ    30 patch    Place 1 patch onto the skin every 24 hours    Tobacco use disorder       STATIN NOT PRESCRIBED (INTENTIONAL)      1 each See Admin Instructions    Type 2 diabetes mellitus without complication, without long-term current use of insulin (H)       VITAMIN B COMPLEX PO      Take 1 tablet by mouth daily        Vitamin C 500 MG Caps      Take 1 capsule by mouth daily        vitamin E 400 UNIT capsule      Take 1 capsule by mouth daily

## 2017-09-21 LAB — HCV AB SERPL QL IA: NONREACTIVE

## 2017-11-18 ENCOUNTER — HOSPITAL ENCOUNTER (OUTPATIENT)
Facility: CLINIC | Age: 52
LOS: 1 days | Discharge: HOME | End: 2017-11-19
Attending: FAMILY MEDICINE | Admitting: SURGERY
Payer: COMMERCIAL

## 2017-11-18 DIAGNOSIS — F17.200 TOBACCO USE DISORDER: ICD-10-CM

## 2017-11-18 DIAGNOSIS — R79.82 ELEVATED C-REACTIVE PROTEIN (CRP): ICD-10-CM

## 2017-11-18 DIAGNOSIS — R11.10 VOMITING AND DIARRHEA: ICD-10-CM

## 2017-11-18 DIAGNOSIS — R19.7 VOMITING AND DIARRHEA: ICD-10-CM

## 2017-11-18 DIAGNOSIS — E66.811 OBESITY, CLASS I, BMI 30-34.9: ICD-10-CM

## 2017-11-18 DIAGNOSIS — R10.11 ABDOMINAL PAIN, RIGHT UPPER QUADRANT: ICD-10-CM

## 2017-11-18 DIAGNOSIS — K81.0 ACUTE CHOLECYSTITIS: ICD-10-CM

## 2017-11-18 DIAGNOSIS — D72.823 LEUKEMOID REACTION: ICD-10-CM

## 2017-11-18 DIAGNOSIS — E11.9 CONTROLLED TYPE 2 DIABETES MELLITUS WITHOUT COMPLICATION, WITHOUT LONG-TERM CURRENT USE OF INSULIN (H): ICD-10-CM

## 2017-11-18 LAB
BASOPHILS # BLD AUTO: 0 10E9/L (ref 0–0.2)
BASOPHILS NFR BLD AUTO: 0.2 %
DIFFERENTIAL METHOD BLD: ABNORMAL
EOSINOPHIL # BLD AUTO: 0.2 10E9/L (ref 0–0.7)
EOSINOPHIL NFR BLD AUTO: 1.2 %
ERYTHROCYTE [DISTWIDTH] IN BLOOD BY AUTOMATED COUNT: 12.6 % (ref 10–15)
HCT VFR BLD AUTO: 40.8 % (ref 40–53)
HGB BLD-MCNC: 14 G/DL (ref 13.3–17.7)
IMM GRANULOCYTES # BLD: 0 10E9/L (ref 0–0.4)
IMM GRANULOCYTES NFR BLD: 0.2 %
LYMPHOCYTES # BLD AUTO: 1.8 10E9/L (ref 0.8–5.3)
LYMPHOCYTES NFR BLD AUTO: 14.5 %
MCH RBC QN AUTO: 31.5 PG (ref 26.5–33)
MCHC RBC AUTO-ENTMCNC: 34.3 G/DL (ref 31.5–36.5)
MCV RBC AUTO: 92 FL (ref 78–100)
MONOCYTES # BLD AUTO: 0.9 10E9/L (ref 0–1.3)
MONOCYTES NFR BLD AUTO: 7.1 %
NEUTROPHILS # BLD AUTO: 9.8 10E9/L (ref 1.6–8.3)
NEUTROPHILS NFR BLD AUTO: 76.8 %
PLATELET # BLD AUTO: 170 10E9/L (ref 150–450)
RBC # BLD AUTO: 4.45 10E12/L (ref 4.4–5.9)
WBC # BLD AUTO: 12.7 10E9/L (ref 4–11)

## 2017-11-18 PROCEDURE — 96375 TX/PRO/DX INJ NEW DRUG ADDON: CPT

## 2017-11-18 PROCEDURE — 99285 EMERGENCY DEPT VISIT HI MDM: CPT | Mod: 25 | Performed by: FAMILY MEDICINE

## 2017-11-18 PROCEDURE — 80053 COMPREHEN METABOLIC PANEL: CPT | Performed by: FAMILY MEDICINE

## 2017-11-18 PROCEDURE — 85025 COMPLETE CBC W/AUTO DIFF WBC: CPT | Performed by: FAMILY MEDICINE

## 2017-11-18 PROCEDURE — 96361 HYDRATE IV INFUSION ADD-ON: CPT

## 2017-11-18 PROCEDURE — 96365 THER/PROPH/DIAG IV INF INIT: CPT

## 2017-11-18 PROCEDURE — 99285 EMERGENCY DEPT VISIT HI MDM: CPT | Mod: 25

## 2017-11-18 PROCEDURE — 83690 ASSAY OF LIPASE: CPT | Performed by: FAMILY MEDICINE

## 2017-11-18 PROCEDURE — 25000128 H RX IP 250 OP 636: Performed by: FAMILY MEDICINE

## 2017-11-18 PROCEDURE — 86140 C-REACTIVE PROTEIN: CPT | Performed by: FAMILY MEDICINE

## 2017-11-18 RX ORDER — ONDANSETRON 2 MG/ML
4 INJECTION INTRAMUSCULAR; INTRAVENOUS EVERY 30 MIN PRN
Status: DISCONTINUED | OUTPATIENT
Start: 2017-11-18 | End: 2017-11-19

## 2017-11-18 RX ORDER — HYDROMORPHONE HYDROCHLORIDE 1 MG/ML
0.5 INJECTION, SOLUTION INTRAMUSCULAR; INTRAVENOUS; SUBCUTANEOUS
Status: COMPLETED | OUTPATIENT
Start: 2017-11-18 | End: 2017-11-19

## 2017-11-18 RX ORDER — SODIUM CHLORIDE 9 MG/ML
1000 INJECTION, SOLUTION INTRAVENOUS CONTINUOUS
Status: DISCONTINUED | OUTPATIENT
Start: 2017-11-18 | End: 2017-11-19

## 2017-11-18 RX ADMIN — SODIUM CHLORIDE 1000 ML: 9 INJECTION, SOLUTION INTRAVENOUS at 23:55

## 2017-11-18 RX ADMIN — HYDROMORPHONE HYDROCHLORIDE 0.5 MG: 1 INJECTION, SOLUTION INTRAMUSCULAR; INTRAVENOUS; SUBCUTANEOUS at 23:56

## 2017-11-18 RX ADMIN — ONDANSETRON HYDROCHLORIDE 4 MG: 2 INJECTION, SOLUTION INTRAMUSCULAR; INTRAVENOUS at 23:55

## 2017-11-18 NOTE — IP AVS SNAPSHOT
[address] [facility]    [address] [place] [address]    Phone:  [phone]                                       After Visit Summary   11/18/2017    Tim Gaona    MRN: [id number]           After Visit Summary Signature Page     I have received my discharge instructions, and my questions have been answered. I have discussed any challenges I see with this plan with the nurse or doctor.    ..........................................................................................................................................  Patient/Patient Representative Signature      ..........................................................................................................................................  Patient Representative Print Name and Relationship to Patient    ..................................................               ................................................  Date                                            Time    ..........................................................................................................................................  Reviewed by Signature/Title    ...................................................              ..............................................  Date                                                            Time

## 2017-11-18 NOTE — IP AVS SNAPSHOT
MRN:[id number]                      After Visit Summary   11/18/2017    Tim Gaona    MRN: [id number]           Thank you!     Thank you for choosing [facility] for your care. Our goal is always to provide you with excellent care. Hearing back from our patients is one way we can continue to improve our services. Please take a few minutes to complete the written survey that you may receive in the mail after you visit with us. Thank you!        Patient Information     Date Of Birth          1965        Designated Caregiver       Most Recent Value    Caregiver    Will someone help with your care after discharge? no      About your hospital stay     You were admitted on:  November 19, 2017 You last received care in the:  [address] [facility]    You were discharged on:  November 19, 2017       Who to Call     For medical emergencies, please call 911.  For non-urgent questions about your medical care, please call your primary care provider or clinic, [phone]  For questions related to your surgery, please call your surgery clinic        Attending Provider     Provider Specialty    Dominic Rangel DO [facility]    Cedillo, DarrenMD [facility]       Primary Care Provider Office Phone # Fax #    Bill Panda [phone]      After Care Instructions     Diet Instructions       Resume pre-procedure diet            Discharge Instructions       Patient to follow up with appointment in 1 weeks            Do not - immerse incision in water until sutures removed       Do not immerse incision in water until sutures removed            Ice to affected area       Ice to operative site PRN            No Alcohol       For 24 hours post procedure            No driving or operating machinery        until the day after procedure            No lifting        No lifting over 15 lbs and no strenuous physical activity for ONE weeks            Shower       No  shower for 24 hours post procedure. May shower Postoperative Day (POD)  1                  Your next 10 appointments already scheduled     Nov 28, 2017  9:00 AM CST   Return Visit with Farhana Cedillo MD   [facility]    [address] [facility] [id number] [phone]              Further instructions from your care team         [facility]    Home Care Following Gallbladder Surgery    Dr. Farhana Cedillo      Care of the Incision:    Remove gauze dressing (if present) after 48 hours.    Leave small strips in place (if present).  They will gradually come off.    If surgical glue was used on your incision, keep it dry for 24 hours.  Then you may shower but don t submerge under water for at least 2 weeks.  Gently pat your incision dry with a freshly laundered towel.    Do not touch your incision with bare hands or pick at scabs.    Leave your incision open to air.  Cover it only if draining, clothing rubs or irritates it.    Activity:    Gradually increase your activity.  Walk short distances several times each day and increase the distance as your strength allows.    To promote circulation, do not cross your legs while sitting.    No strenuous lifting or straining for 2-3 weeks.  Do not lift anything over 10-20 pounds until your doctor approves an increase.    Return to work will be determined by the type of work you do and should be discussed with your physician.    Do not drive or operate equipment while taking prescription pain medicines.  You may drive 1 week after surgery if you have stopped taking prescription pain medicines and can react quickly enough to make an emergency stop if necessary.    Diet:    Return to the diet you were on before surgery.    Drink plenty of water.    Avoid foods that cause constipation.      REMEMBER--most prescription pain pills cause constipation.  Walking, extra fluids, and increased fiber (fresh fruits and  "vegetables, etc.) are natural remedies for constipation.  You can also take mineral oil, 1-2 Tablespoons per day.  If still constipated you may try a stool softener such as Colace or Miralax.    Call Your Physician if You Have:    Redness, increased swelling or cloudy drainage from your incision.    A temperature of more than 101 degrees F.    Worsening pain in your incision not relieved by your prescription pain pills and/or a short rest.    Jaundice (yellowing of skin or eyes)    Abdominal distention (stomach getting very large)    Swelling in your legs    Productive cough    Burning with urination    Any questions or concerns about your recovery, please call     Business hours [phone]    After hours [phone] Nurse Advice Line (24 hours a day)    Follow-up Care:    Make an appointment 2-3 week after your surgery.  Call [phone].      Pending Results     Date and Time Order Name Status Description    11/19/2017 1217 Surgical pathology exam In process             Admission Information     Date & Time Provider Department Dept. Phone    11/18/2017 CedilloFarhana MD [address] [facility] [phone]      Your Vitals Were     Blood Pressure Pulse Temperature Respirations Height Weight    110/72 (BP Location: Left arm) 93 96  F (35.6  C) (Oral) 16 1.778 m (5' 10\") 102.1 kg (225 lb)    Pulse Oximetry BMI (Body Mass Index)                95% 32.28 kg/m2          MyChart Information     [facility] lets you send messages to your doctor, view your test results, renew your prescriptions, schedule appointments and more. To sign up, go to [email or web address] . Click on \"Log in\" on the left side of the screen, which will take you to the Welcome page. Then click on \"Sign up Now\" on the right side of the page.     You will be asked to enter the access code listed below, as well as some personal information. Please follow the directions to create your username and password.     Your access code " is: [email or web address]  Expires: 2017 10:27 AM     Your access code will  in 90 days. If you need help or a new code, please call your [place] clinic or [phone].        Care EveryWhere ID     This is your Care EveryWhere ID. This could be used by other organizations to access your [place] medical records  [id number]        Equal Access to Services     LISA WEEMS AH: Hadii aad ku hadasho Soomaali, waaxda luqadaha, qaybta kaalmada adeegyada, waxay idiin hayshannann adeharrison severino laSeemajamar . So [facility] [phone].    ATENCIÓN: Si habmolly andrews, tiene a dias disposición servicios gratuitos de asistencia lingüística. Llame al [phone].    We comply with applicable federal civil rights laws and [place] laws. We do not discriminate on the basis of race, color, national origin, age, disability, sex, sexual orientation, or gender identity.               Review of your medicines      START taking        Dose / Directions    ibuprofen 600 MG tablet   Commonly known as:  ADVIL/MOTRIN        Dose:  600 mg   Take 1 tablet (600 mg) by mouth every 6 hours as needed for pain (mild)   Quantity:  30 tablet   Refills:  0       oxyCODONE-acetaminophen 5-325 MG per tablet   Commonly known as:  PERCOCET   Used for:  Abdominal pain, right upper quadrant        Dose:  1-2 tablet   Take 1-2 tablets by mouth every 4 hours as needed for pain (moderate to severe)   Quantity:  20 tablet   Refills:  0         CONTINUE these medicines which have NOT CHANGED        Dose / Directions    ASPIRIN PO        Dose:  81 mg   Take 81 mg by mouth daily   Refills:  0       blood glucose monitoring lancets   Used for:  Type 2 diabetes mellitus without complication, without long-term current use of insulin (H)        Use to test blood sugar one time daily or as directed.   Quantity:  1 Box   Refills:  11       blood glucose monitoring meter device kit   Commonly known as:  no brand specified   Used for:  Controlled type 2 diabetes mellitus without  complication, without long-term current use of insulin (H)        Use to test blood sugar 2 times daily or as directed.   Quantity:  1 kit   Refills:  0       blood glucose monitoring test strip   Commonly known as:  no brand specified   Used for:  Type 2 diabetes mellitus without complication, without long-term current use of insulin (H)        Use to test blood sugars one time daily or as directed   Quantity:  100 strip   Refills:  11       glipiZIDE 5 MG tablet   Commonly known as:  GLUCOTROL   Used for:  Type 2 diabetes mellitus without complication, unspecified long term insulin use status (H)        TAKE ONE TABLET BY MOUTH TWICE A DAY BEFORE MEALS   Quantity:  60 tablet   Refills:  3       metFORMIN 1000 MG tablet   Commonly known as:  GLUCOPHAGE   Used for:  Controlled type 2 diabetes mellitus without complication, without long-term current use of insulin (H)        TAKE ONE TABLET BY MOUTH TWICE A DAY WITH MEALS   Quantity:  180 tablet   Refills:  3       VITAMIN B COMPLEX PO        Dose:  1 tablet   Take 1 tablet by mouth daily   Refills:  0       Vitamin C 500 MG Caps        Dose:  1 capsule   Take 1 capsule by mouth daily   Refills:  0       vitamin E 400 UNIT capsule        Dose:  1 capsule   Take 1 capsule by mouth daily   Refills:  0            Where to get your medicines      Some of these will need a paper prescription and others can be bought over the counter. Ask your nurse if you have questions.     Bring a paper prescription for each of these medications     ibuprofen 600 MG tablet    oxyCODONE-acetaminophen 5-325 MG per tablet                Protect others around you: Learn how to safely use, store and throw away your medicines at www.disposemymeds.org.             Medication List: This is a list of all your medications and when to take them. Check marks below indicate your daily home schedule. Keep this list as a reference.      Medications           Morning Afternoon Evening Bedtime As Needed     ASPIRIN PO   Take 81 mg by mouth daily                                   blood glucose monitoring lancets   Use to test blood sugar one time daily or as directed.                                blood glucose monitoring meter device kit   Commonly known as:  no brand specified   Use to test blood sugar 2 times daily or as directed.                                blood glucose monitoring test strip   Commonly known as:  no brand specified   Use to test blood sugars one time daily or as directed                                glipiZIDE 5 MG tablet   Commonly known as:  GLUCOTROL   TAKE ONE TABLET BY MOUTH TWICE A DAY BEFORE MEALS                                      ibuprofen 600 MG tablet   Commonly known as:  ADVIL/MOTRIN   Take 1 tablet (600 mg) by mouth every 6 hours as needed for pain (mild)                                   metFORMIN 1000 MG tablet   Commonly known as:  GLUCOPHAGE   TAKE ONE TABLET BY MOUTH TWICE A DAY WITH MEALS                                      oxyCODONE-acetaminophen 5-325 MG per tablet   Commonly known as:  PERCOCET   Take 1-2 tablets by mouth every 4 hours as needed for pain (moderate to severe)                                   VITAMIN B COMPLEX PO   Take 1 tablet by mouth daily                                   Vitamin C 500 MG Caps   Take 1 capsule by mouth daily                                   vitamin E 400 UNIT capsule   Take 1 capsule by mouth daily

## 2017-11-19 ENCOUNTER — SURGERY (OUTPATIENT)
Age: 52
End: 2017-11-19

## 2017-11-19 ENCOUNTER — ANESTHESIA EVENT (OUTPATIENT)
Dept: SURGERY | Facility: CLINIC | Age: 52
End: 2017-11-19
Payer: COMMERCIAL

## 2017-11-19 ENCOUNTER — APPOINTMENT (OUTPATIENT)
Dept: CT IMAGING | Facility: CLINIC | Age: 52
End: 2017-11-19
Attending: FAMILY MEDICINE
Payer: COMMERCIAL

## 2017-11-19 ENCOUNTER — ANESTHESIA (OUTPATIENT)
Dept: SURGERY | Facility: CLINIC | Age: 52
End: 2017-11-19
Payer: COMMERCIAL

## 2017-11-19 VITALS
WEIGHT: 225 LBS | OXYGEN SATURATION: 95 % | TEMPERATURE: 96 F | SYSTOLIC BLOOD PRESSURE: 110 MMHG | BODY MASS INDEX: 32.21 KG/M2 | RESPIRATION RATE: 16 BRPM | DIASTOLIC BLOOD PRESSURE: 72 MMHG | HEART RATE: 93 BPM | HEIGHT: 70 IN

## 2017-11-19 PROBLEM — R11.2 NAUSEA AND VOMITING: Status: ACTIVE | Noted: 2017-11-19

## 2017-11-19 PROBLEM — K80.00 ACUTE CALCULOUS CHOLECYSTITIS: Status: ACTIVE | Noted: 2017-11-19

## 2017-11-19 PROBLEM — K81.0 ACUTE CHOLECYSTITIS: Status: ACTIVE | Noted: 2017-11-19

## 2017-11-19 PROBLEM — D72.829 LEUCOCYTOSIS: Status: ACTIVE | Noted: 2017-11-19

## 2017-11-19 PROBLEM — R19.7 DIARRHEA: Status: ACTIVE | Noted: 2017-11-19

## 2017-11-19 LAB
ALBUMIN SERPL-MCNC: 3.4 G/DL (ref 3.4–5)
ALP SERPL-CCNC: 75 U/L (ref 40–150)
ALT SERPL W P-5'-P-CCNC: 20 U/L (ref 0–70)
ANION GAP SERPL CALCULATED.3IONS-SCNC: 6 MMOL/L (ref 3–14)
AST SERPL W P-5'-P-CCNC: 10 U/L (ref 0–45)
BILIRUB SERPL-MCNC: 0.5 MG/DL (ref 0.2–1.3)
BUN SERPL-MCNC: 12 MG/DL (ref 7–30)
CALCIUM SERPL-MCNC: 8.9 MG/DL (ref 8.5–10.1)
CHLORIDE SERPL-SCNC: 106 MMOL/L (ref 94–109)
CO2 SERPL-SCNC: 24 MMOL/L (ref 20–32)
CREAT SERPL-MCNC: 0.84 MG/DL (ref 0.66–1.25)
CRP SERPL-MCNC: 124 MG/L (ref 0–8)
GFR SERPL CREATININE-BSD FRML MDRD: >90 ML/MIN/1.7M2
GLUCOSE BLDC GLUCOMTR-MCNC: 142 MG/DL (ref 70–99)
GLUCOSE BLDC GLUCOMTR-MCNC: 169 MG/DL (ref 70–99)
GLUCOSE BLDC GLUCOMTR-MCNC: 226 MG/DL (ref 70–99)
GLUCOSE BLDC GLUCOMTR-MCNC: 254 MG/DL (ref 70–99)
GLUCOSE SERPL-MCNC: 126 MG/DL (ref 70–99)
LIPASE SERPL-CCNC: 108 U/L (ref 73–393)
POTASSIUM SERPL-SCNC: 3.9 MMOL/L (ref 3.4–5.3)
PROT SERPL-MCNC: 7.1 G/DL (ref 6.8–8.8)
SODIUM SERPL-SCNC: 136 MMOL/L (ref 133–144)

## 2017-11-19 PROCEDURE — 47562 LAPAROSCOPIC CHOLECYSTECTOMY: CPT | Performed by: SURGERY

## 2017-11-19 PROCEDURE — 27210794 ZZH OR GENERAL SUPPLY STERILE: Performed by: SURGERY

## 2017-11-19 PROCEDURE — 82962 GLUCOSE BLOOD TEST: CPT

## 2017-11-19 PROCEDURE — 37000009 ZZH ANESTHESIA TECHNICAL FEE, EACH ADDTL 15 MIN: Performed by: SURGERY

## 2017-11-19 PROCEDURE — 25000128 H RX IP 250 OP 636: Performed by: FAMILY MEDICINE

## 2017-11-19 PROCEDURE — 25000564 ZZH DESFLURANE, EA 15 MIN: Performed by: SURGERY

## 2017-11-19 PROCEDURE — 96376 TX/PRO/DX INJ SAME DRUG ADON: CPT

## 2017-11-19 PROCEDURE — 25000125 ZZHC RX 250: Performed by: SURGERY

## 2017-11-19 PROCEDURE — 99236 HOSP IP/OBS SAME DATE HI 85: CPT | Performed by: INTERNAL MEDICINE

## 2017-11-19 PROCEDURE — 25000128 H RX IP 250 OP 636: Performed by: NURSE ANESTHETIST, CERTIFIED REGISTERED

## 2017-11-19 PROCEDURE — 25000125 ZZHC RX 250: Performed by: FAMILY MEDICINE

## 2017-11-19 PROCEDURE — 25000125 ZZHC RX 250: Performed by: NURSE ANESTHETIST, CERTIFIED REGISTERED

## 2017-11-19 PROCEDURE — 71000014 ZZH RECOVERY PHASE 1 LEVEL 2 FIRST HR: Performed by: SURGERY

## 2017-11-19 PROCEDURE — 25000132 ZZH RX MED GY IP 250 OP 250 PS 637: Performed by: INTERNAL MEDICINE

## 2017-11-19 PROCEDURE — 36000058 ZZH SURGERY LEVEL 3 EA 15 ADDTL MIN: Performed by: SURGERY

## 2017-11-19 PROCEDURE — 36000056 ZZH SURGERY LEVEL 3 1ST 30 MIN: Performed by: SURGERY

## 2017-11-19 PROCEDURE — 88304 TISSUE EXAM BY PATHOLOGIST: CPT | Performed by: SURGERY

## 2017-11-19 PROCEDURE — 37000008 ZZH ANESTHESIA TECHNICAL FEE, 1ST 30 MIN: Performed by: SURGERY

## 2017-11-19 PROCEDURE — 25000128 H RX IP 250 OP 636: Performed by: INTERNAL MEDICINE

## 2017-11-19 PROCEDURE — 25000128 H RX IP 250 OP 636: Performed by: SURGERY

## 2017-11-19 PROCEDURE — 99204 OFFICE O/P NEW MOD 45 MIN: CPT | Mod: 57 | Performed by: SURGERY

## 2017-11-19 PROCEDURE — 99207 ZZC CDG-CODE CATEGORY CHANGED: CPT | Performed by: INTERNAL MEDICINE

## 2017-11-19 PROCEDURE — 88304 TISSUE EXAM BY PATHOLOGIST: CPT | Mod: 26 | Performed by: SURGERY

## 2017-11-19 PROCEDURE — 74177 CT ABD & PELVIS W/CONTRAST: CPT

## 2017-11-19 RX ORDER — PROCHLORPERAZINE MALEATE 5 MG
10 TABLET ORAL EVERY 6 HOURS PRN
Status: DISCONTINUED | OUTPATIENT
Start: 2017-11-19 | End: 2017-11-19 | Stop reason: HOSPADM

## 2017-11-19 RX ORDER — METOCLOPRAMIDE HYDROCHLORIDE 5 MG/ML
10 INJECTION INTRAMUSCULAR; INTRAVENOUS EVERY 6 HOURS PRN
Status: DISCONTINUED | OUTPATIENT
Start: 2017-11-19 | End: 2017-11-19 | Stop reason: HOSPADM

## 2017-11-19 RX ORDER — ONDANSETRON 2 MG/ML
4 INJECTION INTRAMUSCULAR; INTRAVENOUS EVERY 6 HOURS PRN
Status: DISCONTINUED | OUTPATIENT
Start: 2017-11-19 | End: 2017-11-19

## 2017-11-19 RX ORDER — NICOTINE 21 MG/24HR
1 PATCH, TRANSDERMAL 24 HOURS TRANSDERMAL DAILY
Status: DISCONTINUED | OUTPATIENT
Start: 2017-11-19 | End: 2017-11-19 | Stop reason: HOSPADM

## 2017-11-19 RX ORDER — HYDROMORPHONE HYDROCHLORIDE 1 MG/ML
.3-.5 INJECTION, SOLUTION INTRAMUSCULAR; INTRAVENOUS; SUBCUTANEOUS
Status: DISCONTINUED | OUTPATIENT
Start: 2017-11-19 | End: 2017-11-19

## 2017-11-19 RX ORDER — SODIUM CHLORIDE, SODIUM LACTATE, POTASSIUM CHLORIDE, CALCIUM CHLORIDE 600; 310; 30; 20 MG/100ML; MG/100ML; MG/100ML; MG/100ML
INJECTION, SOLUTION INTRAVENOUS CONTINUOUS PRN
Status: DISCONTINUED | OUTPATIENT
Start: 2017-11-19 | End: 2017-11-19

## 2017-11-19 RX ORDER — OXYCODONE AND ACETAMINOPHEN 5; 325 MG/1; MG/1
1-2 TABLET ORAL EVERY 4 HOURS PRN
Qty: 20 TABLET | Refills: 0 | Status: SHIPPED | OUTPATIENT
Start: 2017-11-19 | End: 2017-12-22

## 2017-11-19 RX ORDER — NEOSTIGMINE METHYLSULFATE 1 MG/ML
VIAL (ML) INJECTION PRN
Status: DISCONTINUED | OUTPATIENT
Start: 2017-11-19 | End: 2017-11-19

## 2017-11-19 RX ORDER — ONDANSETRON 4 MG/1
4 TABLET, ORALLY DISINTEGRATING ORAL EVERY 6 HOURS PRN
Status: DISCONTINUED | OUTPATIENT
Start: 2017-11-19 | End: 2017-11-19

## 2017-11-19 RX ORDER — SODIUM CHLORIDE, SODIUM LACTATE, POTASSIUM CHLORIDE, CALCIUM CHLORIDE 600; 310; 30; 20 MG/100ML; MG/100ML; MG/100ML; MG/100ML
INJECTION, SOLUTION INTRAVENOUS CONTINUOUS
Status: DISCONTINUED | OUTPATIENT
Start: 2017-11-19 | End: 2017-11-19 | Stop reason: HOSPADM

## 2017-11-19 RX ORDER — DIMENHYDRINATE 50 MG/ML
25 INJECTION, SOLUTION INTRAMUSCULAR; INTRAVENOUS
Status: DISCONTINUED | OUTPATIENT
Start: 2017-11-19 | End: 2017-11-19 | Stop reason: HOSPADM

## 2017-11-19 RX ORDER — PROPOFOL 10 MG/ML
INJECTION, EMULSION INTRAVENOUS PRN
Status: DISCONTINUED | OUTPATIENT
Start: 2017-11-19 | End: 2017-11-19

## 2017-11-19 RX ORDER — PROCHLORPERAZINE 25 MG
25 SUPPOSITORY, RECTAL RECTAL EVERY 12 HOURS PRN
Status: DISCONTINUED | OUTPATIENT
Start: 2017-11-19 | End: 2017-11-19 | Stop reason: HOSPADM

## 2017-11-19 RX ORDER — LIDOCAINE HYDROCHLORIDE 20 MG/ML
INJECTION, SOLUTION INFILTRATION; PERINEURAL PRN
Status: DISCONTINUED | OUTPATIENT
Start: 2017-11-19 | End: 2017-11-19

## 2017-11-19 RX ORDER — OXYCODONE HYDROCHLORIDE 5 MG/1
5-10 TABLET ORAL EVERY 4 HOURS PRN
Status: DISCONTINUED | OUTPATIENT
Start: 2017-11-19 | End: 2017-11-19 | Stop reason: HOSPADM

## 2017-11-19 RX ORDER — ACETAMINOPHEN 10 MG/ML
INJECTION, SOLUTION INTRAVENOUS PRN
Status: DISCONTINUED | OUTPATIENT
Start: 2017-11-19 | End: 2017-11-19

## 2017-11-19 RX ORDER — ONDANSETRON 4 MG/1
4 TABLET, ORALLY DISINTEGRATING ORAL EVERY 6 HOURS PRN
Status: DISCONTINUED | OUTPATIENT
Start: 2017-11-19 | End: 2017-11-19 | Stop reason: HOSPADM

## 2017-11-19 RX ORDER — HYDROMORPHONE HYDROCHLORIDE 1 MG/ML
.3-.5 INJECTION, SOLUTION INTRAMUSCULAR; INTRAVENOUS; SUBCUTANEOUS
Status: DISCONTINUED | OUTPATIENT
Start: 2017-11-19 | End: 2017-11-19 | Stop reason: HOSPADM

## 2017-11-19 RX ORDER — NALOXONE HYDROCHLORIDE 0.4 MG/ML
.1-.4 INJECTION, SOLUTION INTRAMUSCULAR; INTRAVENOUS; SUBCUTANEOUS
Status: DISCONTINUED | OUTPATIENT
Start: 2017-11-19 | End: 2017-11-19 | Stop reason: HOSPADM

## 2017-11-19 RX ORDER — LIDOCAINE HYDROCHLORIDE AND EPINEPHRINE 10; 10 MG/ML; UG/ML
INJECTION, SOLUTION INFILTRATION; PERINEURAL PRN
Status: DISCONTINUED | OUTPATIENT
Start: 2017-11-19 | End: 2017-11-19 | Stop reason: HOSPADM

## 2017-11-19 RX ORDER — FENTANYL CITRATE 50 UG/ML
25-50 INJECTION, SOLUTION INTRAMUSCULAR; INTRAVENOUS
Status: DISCONTINUED | OUTPATIENT
Start: 2017-11-19 | End: 2017-11-19 | Stop reason: HOSPADM

## 2017-11-19 RX ORDER — SODIUM CHLORIDE 9 MG/ML
INJECTION, SOLUTION INTRAVENOUS CONTINUOUS
Status: DISCONTINUED | OUTPATIENT
Start: 2017-11-19 | End: 2017-11-19

## 2017-11-19 RX ORDER — LABETALOL HYDROCHLORIDE 5 MG/ML
5-10 INJECTION, SOLUTION INTRAVENOUS
Status: DISCONTINUED | OUTPATIENT
Start: 2017-11-19 | End: 2017-11-19 | Stop reason: HOSPADM

## 2017-11-19 RX ORDER — ONDANSETRON 2 MG/ML
4 INJECTION INTRAMUSCULAR; INTRAVENOUS EVERY 30 MIN PRN
Status: DISCONTINUED | OUTPATIENT
Start: 2017-11-19 | End: 2017-11-19 | Stop reason: HOSPADM

## 2017-11-19 RX ORDER — ONDANSETRON 2 MG/ML
INJECTION INTRAMUSCULAR; INTRAVENOUS PRN
Status: DISCONTINUED | OUTPATIENT
Start: 2017-11-19 | End: 2017-11-19

## 2017-11-19 RX ORDER — METOCLOPRAMIDE 5 MG/1
10 TABLET ORAL EVERY 6 HOURS PRN
Status: DISCONTINUED | OUTPATIENT
Start: 2017-11-19 | End: 2017-11-19 | Stop reason: HOSPADM

## 2017-11-19 RX ORDER — DEXTROSE MONOHYDRATE 25 G/50ML
25-50 INJECTION, SOLUTION INTRAVENOUS
Status: DISCONTINUED | OUTPATIENT
Start: 2017-11-19 | End: 2017-11-19 | Stop reason: HOSPADM

## 2017-11-19 RX ORDER — MEPERIDINE HYDROCHLORIDE 25 MG/ML
12.5 INJECTION INTRAMUSCULAR; INTRAVENOUS; SUBCUTANEOUS
Status: DISCONTINUED | OUTPATIENT
Start: 2017-11-19 | End: 2017-11-19 | Stop reason: HOSPADM

## 2017-11-19 RX ORDER — LABETALOL HYDROCHLORIDE 5 MG/ML
INJECTION, SOLUTION INTRAVENOUS PRN
Status: DISCONTINUED | OUTPATIENT
Start: 2017-11-19 | End: 2017-11-19

## 2017-11-19 RX ORDER — IBUPROFEN 600 MG/1
600 TABLET, FILM COATED ORAL EVERY 6 HOURS PRN
Qty: 30 TABLET | Refills: 0 | Status: SHIPPED | OUTPATIENT
Start: 2017-11-19 | End: 2019-05-20

## 2017-11-19 RX ORDER — IOPAMIDOL 755 MG/ML
100 INJECTION, SOLUTION INTRAVASCULAR ONCE
Status: COMPLETED | OUTPATIENT
Start: 2017-11-19 | End: 2017-11-19

## 2017-11-19 RX ORDER — KETOROLAC TROMETHAMINE 30 MG/ML
30 INJECTION, SOLUTION INTRAMUSCULAR; INTRAVENOUS ONCE
Status: COMPLETED | OUTPATIENT
Start: 2017-11-19 | End: 2017-11-19

## 2017-11-19 RX ORDER — HYDROMORPHONE HYDROCHLORIDE 1 MG/ML
0.5 INJECTION, SOLUTION INTRAMUSCULAR; INTRAVENOUS; SUBCUTANEOUS
Status: DISCONTINUED | OUTPATIENT
Start: 2017-11-19 | End: 2017-11-19

## 2017-11-19 RX ORDER — NALOXONE HYDROCHLORIDE 0.4 MG/ML
.1-.4 INJECTION, SOLUTION INTRAMUSCULAR; INTRAVENOUS; SUBCUTANEOUS
Status: DISCONTINUED | OUTPATIENT
Start: 2017-11-19 | End: 2017-11-19

## 2017-11-19 RX ORDER — NICOTINE POLACRILEX 4 MG
15-30 LOZENGE BUCCAL
Status: DISCONTINUED | OUTPATIENT
Start: 2017-11-19 | End: 2017-11-19 | Stop reason: HOSPADM

## 2017-11-19 RX ORDER — ONDANSETRON 2 MG/ML
4 INJECTION INTRAMUSCULAR; INTRAVENOUS EVERY 6 HOURS PRN
Status: DISCONTINUED | OUTPATIENT
Start: 2017-11-19 | End: 2017-11-19 | Stop reason: HOSPADM

## 2017-11-19 RX ORDER — HYDROMORPHONE HYDROCHLORIDE 1 MG/ML
.3-.5 INJECTION, SOLUTION INTRAMUSCULAR; INTRAVENOUS; SUBCUTANEOUS EVERY 10 MIN PRN
Status: DISCONTINUED | OUTPATIENT
Start: 2017-11-19 | End: 2017-11-19 | Stop reason: HOSPADM

## 2017-11-19 RX ORDER — FENTANYL CITRATE 50 UG/ML
INJECTION, SOLUTION INTRAMUSCULAR; INTRAVENOUS PRN
Status: DISCONTINUED | OUTPATIENT
Start: 2017-11-19 | End: 2017-11-19

## 2017-11-19 RX ORDER — DEXAMETHASONE SODIUM PHOSPHATE 10 MG/ML
INJECTION, SOLUTION INTRAMUSCULAR; INTRAVENOUS PRN
Status: DISCONTINUED | OUTPATIENT
Start: 2017-11-19 | End: 2017-11-19

## 2017-11-19 RX ORDER — GLYCOPYRROLATE 0.2 MG/ML
INJECTION, SOLUTION INTRAMUSCULAR; INTRAVENOUS PRN
Status: DISCONTINUED | OUTPATIENT
Start: 2017-11-19 | End: 2017-11-19

## 2017-11-19 RX ORDER — ONDANSETRON 4 MG/1
4 TABLET, ORALLY DISINTEGRATING ORAL EVERY 30 MIN PRN
Status: DISCONTINUED | OUTPATIENT
Start: 2017-11-19 | End: 2017-11-19 | Stop reason: HOSPADM

## 2017-11-19 RX ADMIN — NICOTINE 1 PATCH: 21 PATCH TRANSDERMAL at 04:37

## 2017-11-19 RX ADMIN — LIDOCAINE HYDROCHLORIDE,EPINEPHRINE BITARTRATE 20 ML: 10; .01 INJECTION, SOLUTION INFILTRATION; PERINEURAL at 12:23

## 2017-11-19 RX ADMIN — HYDROMORPHONE HYDROCHLORIDE 0.5 MG: 1 INJECTION, SOLUTION INTRAMUSCULAR; INTRAVENOUS; SUBCUTANEOUS at 11:03

## 2017-11-19 RX ADMIN — SODIUM CHLORIDE 60 ML: 9 INJECTION, SOLUTION INTRAVENOUS at 01:52

## 2017-11-19 RX ADMIN — TAZOBACTAM SODIUM AND PIPERACILLIN SODIUM 3.38 G: 375; 3 INJECTION, SOLUTION INTRAVENOUS at 02:51

## 2017-11-19 RX ADMIN — LABETALOL HYDROCHLORIDE 5 MG: 5 INJECTION, SOLUTION INTRAVENOUS at 11:12

## 2017-11-19 RX ADMIN — FENTANYL CITRATE 50 MCG: 50 INJECTION, SOLUTION INTRAMUSCULAR; INTRAVENOUS at 10:25

## 2017-11-19 RX ADMIN — GLYCOPYRROLATE 0.1 MG: 0.2 INJECTION, SOLUTION INTRAMUSCULAR; INTRAVENOUS at 10:23

## 2017-11-19 RX ADMIN — LIDOCAINE HYDROCHLORIDE 40 MG: 20 INJECTION, SOLUTION INFILTRATION; PERINEURAL at 10:27

## 2017-11-19 RX ADMIN — Medication 4 MCG: at 10:31

## 2017-11-19 RX ADMIN — HYDROMORPHONE HYDROCHLORIDE 0.5 MG: 1 INJECTION, SOLUTION INTRAMUSCULAR; INTRAVENOUS; SUBCUTANEOUS at 02:00

## 2017-11-19 RX ADMIN — HYDROMORPHONE HYDROCHLORIDE 0.5 MG: 1 INJECTION, SOLUTION INTRAMUSCULAR; INTRAVENOUS; SUBCUTANEOUS at 00:19

## 2017-11-19 RX ADMIN — Medication 3 MG: at 12:16

## 2017-11-19 RX ADMIN — ACETAMINOPHEN 1000 MG: 10 INJECTION, SOLUTION INTRAVENOUS at 11:07

## 2017-11-19 RX ADMIN — ONDANSETRON 4 MG: 2 SOLUTION INTRAMUSCULAR; INTRAVENOUS at 13:26

## 2017-11-19 RX ADMIN — ONDANSETRON 4 MG: 2 INJECTION INTRAMUSCULAR; INTRAVENOUS at 12:03

## 2017-11-19 RX ADMIN — Medication 2 MCG: at 10:59

## 2017-11-19 RX ADMIN — SODIUM CHLORIDE: 9 INJECTION, SOLUTION INTRAVENOUS at 04:02

## 2017-11-19 RX ADMIN — SODIUM CHLORIDE, POTASSIUM CHLORIDE, SODIUM LACTATE AND CALCIUM CHLORIDE: 600; 310; 30; 20 INJECTION, SOLUTION INTRAVENOUS at 11:59

## 2017-11-19 RX ADMIN — KETOROLAC TROMETHAMINE 30 MG: 30 INJECTION, SOLUTION INTRAMUSCULAR at 08:08

## 2017-11-19 RX ADMIN — IOPAMIDOL 97 ML: 755 INJECTION, SOLUTION INTRAVENOUS at 01:51

## 2017-11-19 RX ADMIN — PROPOFOL 200 MG: 10 INJECTION, EMULSION INTRAVENOUS at 10:27

## 2017-11-19 RX ADMIN — ROCURONIUM BROMIDE 5 MG: 10 INJECTION INTRAVENOUS at 10:26

## 2017-11-19 RX ADMIN — HYDROMORPHONE HYDROCHLORIDE 0.5 MG: 1 INJECTION, SOLUTION INTRAMUSCULAR; INTRAVENOUS; SUBCUTANEOUS at 06:40

## 2017-11-19 RX ADMIN — HYDROMORPHONE HYDROCHLORIDE 1 MG: 1 INJECTION, SOLUTION INTRAMUSCULAR; INTRAVENOUS; SUBCUTANEOUS at 08:07

## 2017-11-19 RX ADMIN — GLYCOPYRROLATE 0.5 MG: 0.2 INJECTION, SOLUTION INTRAMUSCULAR; INTRAVENOUS at 12:16

## 2017-11-19 RX ADMIN — SODIUM CHLORIDE 1000 ML: 9 INJECTION, SOLUTION INTRAVENOUS at 02:51

## 2017-11-19 RX ADMIN — PROPOFOL 40 MG: 10 INJECTION, EMULSION INTRAVENOUS at 10:32

## 2017-11-19 RX ADMIN — ROCURONIUM BROMIDE 40 MG: 10 INJECTION INTRAVENOUS at 10:34

## 2017-11-19 RX ADMIN — Medication 4 MCG: at 13:07

## 2017-11-19 RX ADMIN — TAZOBACTAM SODIUM AND PIPERACILLIN SODIUM 3.38 G: 375; 3 INJECTION, SOLUTION INTRAVENOUS at 08:08

## 2017-11-19 RX ADMIN — Medication 2 MCG: at 11:11

## 2017-11-19 RX ADMIN — FENTANYL CITRATE 50 MCG: 50 INJECTION, SOLUTION INTRAMUSCULAR; INTRAVENOUS at 10:22

## 2017-11-19 RX ADMIN — ROCURONIUM BROMIDE 10 MG: 10 INJECTION INTRAVENOUS at 11:17

## 2017-11-19 RX ADMIN — LABETALOL HYDROCHLORIDE 5 MG: 5 INJECTION, SOLUTION INTRAVENOUS at 11:38

## 2017-11-19 RX ADMIN — SODIUM CHLORIDE, POTASSIUM CHLORIDE, SODIUM LACTATE AND CALCIUM CHLORIDE: 600; 310; 30; 20 INJECTION, SOLUTION INTRAVENOUS at 10:32

## 2017-11-19 RX ADMIN — DEXAMETHASONE SODIUM PHOSPHATE 4 MG: 10 INJECTION, SOLUTION INTRAMUSCULAR; INTRAVENOUS at 10:38

## 2017-11-19 RX ADMIN — Medication 100 MG: at 10:28

## 2017-11-19 RX ADMIN — HYDROMORPHONE HYDROCHLORIDE 0.5 MG: 1 INJECTION, SOLUTION INTRAMUSCULAR; INTRAVENOUS; SUBCUTANEOUS at 02:57

## 2017-11-19 RX ADMIN — ONDANSETRON 4 MG: 2 SOLUTION INTRAMUSCULAR; INTRAVENOUS at 04:37

## 2017-11-19 RX ADMIN — PHENYLEPHRINE HYDROCHLORIDE 100 MCG: 10 INJECTION, SOLUTION INTRAMUSCULAR; INTRAVENOUS; SUBCUTANEOUS at 10:41

## 2017-11-19 RX ADMIN — HYDROMORPHONE HYDROCHLORIDE 0.5 MG: 1 INJECTION, SOLUTION INTRAMUSCULAR; INTRAVENOUS; SUBCUTANEOUS at 04:37

## 2017-11-19 ASSESSMENT — ACTIVITIES OF DAILY LIVING (ADL)
TRANSFERRING: 0-->INDEPENDENT
BATHING: 0-->INDEPENDENT
FALL_HISTORY_WITHIN_LAST_SIX_MONTHS: NO
AMBULATION: 0-->INDEPENDENT
RETIRED_COMMUNICATION: 0-->UNDERSTANDS/COMMUNICATES WITHOUT DIFFICULTY
TOILETING: 0-->INDEPENDENT
SWALLOWING: 0-->SWALLOWS FOODS/LIQUIDS WITHOUT DIFFICULTY
COGNITION: 0 - NO COGNITION ISSUES REPORTED
DRESS: 0-->INDEPENDENT
RETIRED_EATING: 0-->INDEPENDENT

## 2017-11-19 ASSESSMENT — ENCOUNTER SYMPTOMS
MUSCULOSKELETAL NEGATIVE: 1
CHEST TIGHTNESS: 0
FATIGUE: 1
SHORTNESS OF BREATH: 0
DYSURIA: 0
CONSTIPATION: 0
RESPIRATORY NEGATIVE: 1
NAUSEA: 1
NEUROLOGICAL NEGATIVE: 1
ACTIVITY CHANGE: 1
FREQUENCY: 1
TROUBLE SWALLOWING: 0
EYES NEGATIVE: 1
RECTAL PAIN: 0
POLYDIPSIA: 0
COLOR CHANGE: 0
VOICE CHANGE: 0
ANAL BLEEDING: 0
BLOOD IN STOOL: 0
DIAPHORESIS: 0
PALPITATIONS: 0
DIARRHEA: 1
WOUND: 0
ABDOMINAL PAIN: 1
APPETITE CHANGE: 1
FLANK PAIN: 0
FEVER: 0
VOMITING: 1
ABDOMINAL DISTENTION: 1
POLYPHAGIA: 0
COUGH: 0
UNEXPECTED WEIGHT CHANGE: 0
CHILLS: 0
PSYCHIATRIC NEGATIVE: 1

## 2017-11-19 ASSESSMENT — LIFESTYLE VARIABLES: TOBACCO_USE: 1

## 2017-11-19 NOTE — PROGRESS NOTES
Transfer from pacu to Room 274   Transferred to bed via Cart   S: 53 y/o M S/P mike lemus  Anesthesia Type: General  Surgeon: Dr. Cedillo  Allergies: See Medication Reconciliation Record  DNR: No   B: Pertinent Medical History:   Past Medical History:   Diagnosis Date     Diabetes mellitus, type 2 (H)      Obesity, Class I, BMI 30-34.9 10/26/2015     Surgical History:   Past Surgical History:   Procedure Laterality Date     HC REMOVE TONSILS/ADENOIDS,<13 Y/O  1971    T & A <12y.o.   A: EBL: 6ml  IVF: 1650ml total  Pain: Denies  UOP: none  NPO: ___Yes _X__No   Vomiting: ___Yes _X__No Nausea; Zofran given with relief  Drainage: None  Skin Integrity: Intact except x4 abdominal trocar sites   RFO: ___Yes__X_No   SSI Patient? ___Yes_X__No  Brace/sling/equipment: ___Yes_X__No   See PACU record for ongoing assessment, vital signs and pain assessment.  R: Post-Op vitals and assessments as ordered/indicated per patient's condition.  Follow Post-Op orders and notify Physician prn.  Continue to involve patient/family in plan of care and discharge planning.  Reinforce Pre-Operative education.  Implement skin safety interventions as appropriate.  Name: Kerri HOSKINS RN, report given to BARB Teran

## 2017-11-19 NOTE — H&P
[facility]  Surgery Consultation         [facility] Nguyen    Tim Gaona MRN# [id number]   YOB: 1965 Age: 52 year old      Date of Consult: 11/19/2017         Assessment and Plan:   Tim Gaona is a 52 year old male who presented with history, exam, laboratory and imaging most consistent with acute cholecystitis.       ICD-10-CM    1. Acute cholecystitis K81.0 Glucose by meter     Glucose by meter   2. Abdominal pain, right upper quadrant R10.11    3. Vomiting and diarrhea R11.10     R19.7    4. Tobacco use disorder F17.200    5. Controlled type 2 diabetes mellitus without complication, without long-term current use of insulin (H) E11.9    6. Obesity, Class I, BMI 30-34.9 E66.9    7. Leukemoid reaction D72.823    8. Elevated C-reactive protein (CRP) R79.82      The patient was thoroughly counseled regarding their acute cholecystitis    The patient was informed that the proposed procedure or medical intervention involves removal of the gallbladder laparoscopically (with small incisions and camera) possibly open and does offer a very good likelihood of symptom relief.     The patient was made aware of the risks of the procedure, including but not limited to:    bleeding, conversion to open, bile leak, bile duct injury or other adjacent organ injury, retained gallstones, cardiac or pulmonary related complications and anesthesia complications. Also that difficulties may be encountered during recovery to include: wound or deep intraabdominal infection, wound dehiscence, incisional hernia, bile leak or retained stone requiring ERCP.     In the course of the evaluation we did discuss other therapeutic options with the patient, including antibiotics and/or drainage. The risks and benefits of these options were also discussed which include but are not limited to: recurrent worsening episodes.     Also discussed were possible problems or difficulties the patient may encounter if treatment  was not pursued at this time. These include: worsening episodes, cholangitis and/or pancreatitis.     The patient was [facility] will be primarily responsible for the procedure. Assistance during the procedure and during hospitalization may also be provided by other physicians, nurses and technicians.     The patient was also informed that if exposure to the patient s blood or body fluids occurs during the procedure, HIV testing of the patient will occur unless they refuse at this time. Risk of blood transfusion is minimal.     The patient will be provided additional education resources by the support staff. If there are ever any questions regarding their diagnosis or the procedure, the patient is encouraged to ask.     All of the patient s or their legal representative s questions have been answered to their satisfaction and they have indicated a clear understanding of this discussion.   Tim expressed understanding of risks, benefits and alternatives and wished to proceed.     All findings, test results, and diagnosis were discussed with the patient. Tim  participated in the decision making process and agreed with the plan of care. Questions were sought and answered.               Code Status:   Full Code         Primary Care Physician:   Bill Panda         Requesting Physician:      No ref. provider found         Chief Complaint:     Chief Complaint   Patient presents with     Abdominal Pain       History is obtained from the patient         History of Present Illness:   Tim Gaona is a 52 year old male who presents on consultation with acute cholecysitis. Pt stated had similar episode 3 weeks ago where he had RUQ pain, back pain, some nausea and vomiting.  Stated he hasn't felt the same since.   The particular episode came on yesterday and thus prompted to go to the ED.  Noted N/V.  +RUQ pain.  +chills but afebrile.  Pain is constant and does not seem to be related to food  intake.  Pt denies hx of MI, CVA.  Pt denies any PMH.  Pt is a smoker, 35 yrs+; 1-1.5ppd; no intention to quit at this time.     In the ED, noted to have RUQ pain, N/V, leukotocysis; CT abd/pel noted acute cholecysititis.  The past medical history, past surgical history, family history, social history and review of systems was performed and as noted.           Past Medical History:     Past Medical History:   Diagnosis Date     Diabetes mellitus, type 2 (H)      Obesity, Class I, BMI 30-34.9 10/26/2015             Past Surgical History:     Past Surgical History:   Procedure Laterality Date     HC REMOVE TONSILS/ADENOIDS,<13 Y/O  1971    T & A <12y.o.            Home Medications:     Prior to Admission medications    Medication Sig Last Dose Taking? Auth Provider   glipiZIDE (GLUCOTROL) 5 MG tablet TAKE ONE TABLET BY MOUTH TWICE A DAY BEFORE MEALS 11/18/2017 at PM Yes Bill Panda MD   metFORMIN (GLUCOPHAGE) 1000 MG tablet TAKE ONE TABLET BY MOUTH TWICE A DAY WITH MEALS 11/18/2017 at PM Yes Bill Panda MD   ASPIRIN PO Take 81 mg by mouth daily 11/18/2017 at Unknown time Yes Reported, Patient   Ascorbic Acid (VITAMIN C) 500 MG CAPS Take 1 capsule by mouth daily 11/18/2017 at Unknown time Yes Reported, Patient   vitamin E 400 UNIT capsule Take 1 capsule by mouth daily 11/18/2017 at Unknown time Yes Reported, Patient   B Complex Vitamins (VITAMIN B COMPLEX PO) Take 1 tablet by mouth daily 11/18/2017 at Unknown time Yes Reported, Patient   blood glucose monitoring (NO BRAND SPECIFIED) meter device kit Use to test blood sugar 2 times daily or as directed.   Bill Panda MD   blood glucose monitoring (ACCU-CHEK MULTICLIX) lancets Use to test blood sugar one time daily or as directed.   Bill Panda MD   blood glucose monitoring (NO BRAND SPECIFIED) test strip Use to test blood sugars one time daily or as directed   Bill Panda MD            Current Medications:            "piperacillin-tazobactam  3.375 g Intravenous Q6H     insulin aspart  1-6 Units Subcutaneous Q4H     nicotine   Transdermal Q8H     nicotine   Transdermal Daily     nicotine  1 patch Transdermal Daily     naloxone, HYDROmorphone, ondansetron **OR** ondansetron, prochlorperazine **OR** prochlorperazine **OR** prochlorperazine, glucose **OR** dextrose **OR** glucagon         Allergies:     Allergies   Allergen Reactions     Aleve [Naproxen] Itching            Social History:   Tim Gaona  reports that he has been smoking Cigarettes.  He has a 24.00 pack-year smoking history. He has never used smokeless tobacco. He reports that he does not drink alcohol or use illicit drugs.          Family History:     Family History   Problem Relation Age of Onset     Thyroid Disease Mother      Gallbladder Disease Mother      HEART DISEASE Father      Lipids Father      Thyroid Disease Paternal Grandmother      Gallbladder Disease Paternal Grandmother      Thyroid Disease Paternal Grandfather      Gallbladder Disease Sister              Review of Systems:   The 10 point Review of Systems is negative other than noted in the HPI or here.           Physical Exam:    ,   Vitals: BP (!) 151/99 (BP Location: Left arm)  Pulse 79  Temp 96  F (35.6  C) (Oral)  Resp 18  Ht 5' 10\" (1.778 m)  Wt 225 lb (102.1 kg)  SpO2 98%  BMI 32.28 kg/m2  BMI= Body mass index is 32.28 kg/(m^2).    Constitutional: Awake, alert, no acute distress.  Eyes: No scleral icterus.  Conjunctiva are without injection.  ENMT: Mucous membranes moist, dentition and gums are intact.   Neck: Soft, supple, trachea midline.    Endocrine: The thyroid is without masses and mobile with swallow.   Lymphatic: There is no cervical, submandibular, supraclavicular/infraclavicular, axillary, or inguinal adenopathy.  Respiratory: Lungs are clear to auscultation and percussion bilaterally.   Cardiovascular: Regular rate and rhythm. No murmurs, rubs, or gallops. no bilateral " lower extremity edema.   Abdomen: Non-distended, tender at RUQ, normoactive bowel sounds present, No masses, umbilical or inguinal hernias, or flank tenderness. No hepatosplenomegaly.   Musculoskeletal: No spinal or CVA tenderness. Full range of motion in the upper and lower extremities.    Skin: No skin rashes or lesions to inspection.  No petechia.    Neurologic: Cranial nerves II through XII are grossly intact and symmetric.  Psychiatric: The patient is alert and oriented times 3.  The patient's affect is not blunted and mood is appropriate.       Data:   All new lab and imaging data was reviewed.   Results for orders placed or performed during the hospital encounter of 11/18/17 (from the past 24 hour(s))   CBC with platelets differential   Result Value Ref Range    WBC 12.7 (H) 4.0 - 11.0 10e9/L    RBC Count 4.45 4.4 - 5.9 10e12/L    Hemoglobin 14.0 13.3 - 17.7 g/dL    Hematocrit 40.8 40.0 - 53.0 %    MCV 92 78 - 100 fl    MCH 31.5 26.5 - 33.0 pg    MCHC 34.3 31.5 - 36.5 g/dL    RDW 12.6 10.0 - 15.0 %    Platelet Count 170 150 - 450 10e9/L    Diff Method Automated Method     % Neutrophils 76.8 %    % Lymphocytes 14.5 %    % Monocytes 7.1 %    % Eosinophils 1.2 %    % Basophils 0.2 %    % Immature Granulocytes 0.2 %    Absolute Neutrophil 9.8 (H) 1.6 - 8.3 10e9/L    Absolute Lymphocytes 1.8 0.8 - 5.3 10e9/L    Absolute Monocytes 0.9 0.0 - 1.3 10e9/L    Absolute Eosinophils 0.2 0.0 - 0.7 10e9/L    Absolute Basophils 0.0 0.0 - 0.2 10e9/L    Abs Immature Granulocytes 0.0 0 - 0.4 10e9/L   CRP inflammation   Result Value Ref Range    CRP Inflammation 124.0 (H) 0.0 - 8.0 mg/L   Comprehensive metabolic panel   Result Value Ref Range    Sodium 136 133 - 144 mmol/L    Potassium 3.9 3.4 - 5.3 mmol/L    Chloride 106 94 - 109 mmol/L    Carbon Dioxide 24 20 - 32 mmol/L    Anion Gap 6 3 - 14 mmol/L    Glucose 126 (H) 70 - 99 mg/dL    Urea Nitrogen 12 7 - 30 mg/dL    Creatinine 0.84 0.66 - 1.25 mg/dL    GFR Estimate >90 >60  mL/min/1.7m2    GFR Estimate If Black >90 >60 mL/min/1.7m2    Calcium 8.9 8.5 - 10.1 mg/dL    Bilirubin Total 0.5 0.2 - 1.3 mg/dL    Albumin 3.4 3.4 - 5.0 g/dL    Protein Total 7.1 6.8 - 8.8 g/dL    Alkaline Phosphatase 75 40 - 150 U/L    ALT 20 0 - 70 U/L    AST 10 0 - 45 U/L   Lipase   Result Value Ref Range    Lipase 108 73 - 393 U/L   CT Abdomen Pelvis w Contrast    Narrative    CT ABDOMEN PELVIS W CONTRAST   11/19/2017 1:52 AM     HISTORY: Right upper quadrant and epigastric pain.    TECHNIQUE: 97mL Isovue 370 IV were administered. After contrast  administration, volumetric helical sections were acquired from the  lung bases to the ischial tuberosities. Coronal images were also  reconstructed. Radiation dose for this scan was reduced using  automated exposure control, adjustment of the mA and/or kV according  to patient size, or iterative reconstruction technique.    COMPARISON: None.     FINDINGS: There is diffuse gallbladder wall thickening, as well as  probable sludge and/or stones within the gallbladder. A few small  calcified granulomas in the spleen. A 0.7 cm low-density lesion in the  interpolar region of the right kidney likely represents a cortical  cyst, but is too small for definitive characterization. The liver,  spleen, adrenal glands, pancreas, and kidneys are otherwise  unremarkable. No hydronephrosis. Mild atherosclerotic aortoiliac  calcification. No bowel obstruction. Unremarkable appendix. No free  fluid in the pelvis. Small calcified granuloma in the right lower lobe  of the lung. Mild scarring or atelectasis at both lung bases  posteriorly.      Impression    IMPRESSION: Diffuse gallbladder wall thickening with probable sludge  and/or stones within the gallbladder. Acute cholecystitis could have  this appearance. Please clinically correlate. Right upper quadrant  ultrasound may be helpful for further evaluation.    JAYY SPARKS MD   Glucose by meter   Result Value Ref Range    Glucose  142 (H) 70 - 99 mg/dL       CT scan of the abdomen:   No masses, free air, abcesses or significant abnormalities  CT scan interpreted by radiologist       Darren-Marlin Cedillo DO    [facility]

## 2017-11-19 NOTE — PROGRESS NOTES
S-(situation): Patient arrives to room 274 via cart from ED    B-(background): Cholecystitis     A-(assessment): Pt is alert and oriented. Rates upper abdominal pain 5/10. Mild nausea on and off. Occasional loose stools reported at home. Pre op EKG and UA obtained.    R-(recommendations): Orders reviewed with patient and spouse. Will monitor patient per MD orders.

## 2017-11-19 NOTE — OP NOTE
OPERATIVE NOTE  [facility] SURGERY    DATE:  11/19/2017    SURGEON:  Farhana Cedillo DO    ASSISTANT:  Bettye Ballesteros MS3    PREOPERATIVE DIAGNOSIS:  Acute cholecystitis  DMII  obesity    POSTOPERATIVE DIAGNOSIS:  acute calculus cholecystitis  DMII  Obesity    OPERATION:  Laparoscopic cholecystectomy .    ANESTHESIA:  General endotracheal.    INDICATIONS FOR PROCEDURE:  The patient is a 52 year old year old male with acute calculus cholecystitis on CT abd/pel.  Based on this, laparoscopic cholecystectomy was recommended.    FINDINGS:  Acute calculus cholecysitis     PROCEDURE:  The patient was brought to the operating room and placed in the supine position upon the operating table. Nursing and anesthesia assured proper positioning prior to the procedure. A time-out was taken to assure proper patient, site and procedure with all in the operating room.  Local anesthesia was instilled above to the umbilicus and a vertical incision was made.  This was carried to the level of the fascia bluntly with S-retractors.  Kochers were used to elevated the fascia and incised with a #15 blade.  0-Vicryl stay sutures were placed on both cut fascial edges.  The abdomen was entered and a 12-mm port was placed.  Pneumoperitoneum was obtained.  The laparoscope was placed and a brief 4-quadrant scan of the abdomen revealed no obvious intraabdominal pathology.  We next placed 3 additional ports.  The first of these was an 5-millimeter port in the epigastric position.  The next 2 were 5-millimeter ports in the right subcostal position, all were placed after local anesthesia and under direct laparoscopic visualization.  Noted gallbladder thicken rind and very edematous; thus we had to decompress the gallbladder with a large bore and withdraw 40cc of bilious fluid from its lumen.  The gallbladder was then grasped using a ratcheted grasper and retracted superiorly and laterally.  The infundibulum was retracted laterally and the  peritoneum was stripped from the infundibulum and cystic duct both bluntly and with electrocautery.  The triangle of Calot was dissected noting the cystic duct which was dissected circumferentially.  This could be seen coming clearly from the infundibulum visualizing the critical view.  The cystic duct was triple clipped proximal, once distal, and divided.  The triangle of Calot was then dissected and the cystic artery was identified.  This was dissected circumferentially and double clipped proximal, single distal, and divided sharply.  The gallbladder was then dissected from the gallbladder fossa using electrocautery.   After the gallbladder was dissected free, it was removed from the abdomen via the umbilicus port site.  The umbilical site had to be enlarged as pt has a huge stone at the fundus.  The right upper quadrant was then inspected and found to be hemostatic.   We had spillage of the bile during the removal of gallbladder from the underneath of the gallbladder.  We irrigated the abdomen until the irrigation was clear and all were suctioned out.   The ports were removed under direct visualization and pneumoperitoneum was released.  The Teresa port was removed. The fascia was closed with 3 separate 0-vicryl on a UR6 in a figure of eight fashion.  The skin was closed using 3-0 Monocryl and Steri-Strips were applied.  The patient tolerated the procedure well and there were no complications. All counts were correct at the end of the case. The patient was transferred to the Post Anesthesia Recovery Room in stable condition.    [facility] General Surgery

## 2017-11-19 NOTE — ED NOTES
Patient here with upper abdominal pain, comes and goes. C/O diarrhea and intermittent nausea also.

## 2017-11-19 NOTE — ANESTHESIA PREPROCEDURE EVALUATION
Anesthesia Evaluation     . Pt has had prior anesthetic. Type: General    No history of anesthetic complications          ROS/MED HX    ENT/Pulmonary:     (+)tobacco use, Current use 1/2 packs/day  , . .    Neurologic:  - neg neurologic ROS     Cardiovascular:     (+) Dyslipidemia, ----. : . . . :. . Previous cardiac testing date:results:date: results:ECG reviewed date:11/19/17 results:SR date: results:          METS/Exercise Tolerance:  >4 METS   Hematologic:  - neg hematologic  ROS       Musculoskeletal:  - neg musculoskeletal ROS       GI/Hepatic:  - neg GI/hepatic ROS       Renal/Genitourinary:  - ROS Renal section negative       Endo:     (+) type II DM Not using insulin - not using insulin pump Normal glucose range: 100-150's not previously admitted for DM/DKA Obesity, .   (-) Type I DM   Psychiatric:  - neg psychiatric ROS       Infectious Disease:   (+) Other Infectious Disease Acute allan      Malignancy:      - no malignancy   Other:    - neg other ROS                 Physical Exam  Normal systems: cardiovascular and pulmonary    Airway   Mallampati: II  TM distance: >3 FB  Neck ROM: full    Dental   (+) chipped and caps    Cardiovascular   Rhythm and rate: regular and normal      Pulmonary    breath sounds clear to auscultation    Other findings: Poor dentition                Anesthesia Plan      History & Physical Review  History and physical reviewed and following examination; no interval change.    ASA Status:  2 emergent.    NPO Status:  > 6 hours    Plan for General, RSI and ETT with Intravenous and Propofol induction. Maintenance will be Balanced.    PONV prophylaxis:  Ondansetron (or other 5HT-3) and Dexamethasone or Solumedrol  Additional equipment: Videolaryngoscope      Postoperative Care  Postoperative pain management:  IV analgesics and Oral pain medications.      Consents  Anesthetic plan, risks, benefits and alternatives discussed with:  Patient.  Use of blood products discussed: No .   .                           .

## 2017-11-19 NOTE — BRIEF OP NOTE
[facility]    Pre-operative diagnosis: Acute cholecystitis          Post-operative diagnosis acute calculus cholecystitis, obesity   Procedure: Procedure(s):  Laparoscopic Cholecystectomy - Wound Class: III-Contaminated   Surgeon(s): Surgeon(s) and Role:     * Farhana Cedillo MD - Primary   Estimated blood loss: Less than 10 ml    Specimens:   ID Type Source Tests Collected by Time Destination   A : Gallbladder and contents Tissue Gallbladder and Contents SURGICAL PATHOLOGY EXAM Farhana Cedillo MD 11/19/2017 12:15 PM       Findings: ACUTE CALCULUS CHOLECYSTITIS     Farhana Cedillo DO  [facility]

## 2017-11-19 NOTE — PLAN OF CARE
"Problem: Patient Care Overview  Goal: Plan of Care/Patient Progress Review  Patient has ongoing upper abdominal pain and mild nausea. Dilaudid and Zofran given with some relief. Pt has not had any emesis since admission. He has been NPO for surgery later today. Pt and wife declined insulin until sugars are \"really high\". Dr. Regalado updated. Wife present at bedside.       "

## 2017-11-19 NOTE — DISCHARGE INSTRUCTIONS
[facility]    Home Care Following Gallbladder Surgery    Dr. Banks-Marlin Cedillo      Care of the Incision:    Remove gauze dressing (if present) after 48 hours.    Leave small strips in place (if present).  They will gradually come off.    If surgical glue was used on your incision, keep it dry for 24 hours.  Then you may shower but don t submerge under water for at least 2 weeks.  Gently pat your incision dry with a freshly laundered towel.    Do not touch your incision with bare hands or pick at scabs.    Leave your incision open to air.  Cover it only if draining, clothing rubs or irritates it.    Activity:    Gradually increase your activity.  Walk short distances several times each day and increase the distance as your strength allows.    To promote circulation, do not cross your legs while sitting.    No strenuous lifting or straining for 2-3 weeks.  Do not lift anything over 10-20 pounds until your doctor approves an increase.    Return to work will be determined by the type of work you do and should be discussed with your physician.    Do not drive or operate equipment while taking prescription pain medicines.  You may drive 1 week after surgery if you have stopped taking prescription pain medicines and can react quickly enough to make an emergency stop if necessary.    Diet:    Return to the diet you were on before surgery.    Drink plenty of water.    Avoid foods that cause constipation.      REMEMBER--most prescription pain pills cause constipation.  Walking, extra fluids, and increased fiber (fresh fruits and vegetables, etc.) are natural remedies for constipation.  You can also take mineral oil, 1-2 Tablespoons per day.  If still constipated you may try a stool softener such as Colace or Miralax.    Call Your Physician if You Have:    Redness, increased swelling or cloudy drainage from your incision.    A temperature of more than 101 degrees F.    Worsening pain in your incision not relieved  by your prescription pain pills and/or a short rest.    Jaundice (yellowing of skin or eyes)    Abdominal distention (stomach getting very large)    Swelling in your legs    Productive cough    Burning with urination    Any questions or concerns about your recovery, please call     Business hours [phone]    After hours [phone] Nurse Advice Line (24 hours a day)    Follow-up Care:    Make an appointment 2-3 week after your surgery.  Call [phone].

## 2017-11-19 NOTE — H&P
[facility]    History and Physical  Hospitalist       Date of Admission:  2017  Date of Service (when I saw the patient): 17    Assessment & Plan       Active Problems:    Acute cholecystitis    Assessment: associated with vomiting, diarrhea, ongoing pain and leucocytosis.  Stones and/or sludge are seen in the gallbladder and he has a strong family history of gallbladder problems so likely has acute cholecystitis on that basis.  Thankfully his LFT's and lipase are normal and he has no signs of sepsis other than an elevated WBC.  He is having ongoing pain and has had diarrhea for several days.  Dr. Rangel discussed with Dr. Cedillo and she is planning on taking his gallbladder out later today. With his other associated symptoms it is anticipated he may need to spend another night in the hospital managing his pain, vomiting and diarrhea and will therefore admit to inpatient.  He has not had any CP or SOB and is physically active.  He has not had any problems with anesthesia and has not had any problems with bleeding and never required a blood transfusion.  No FH of any member who has  from an anesthetic or known to have a serious side effect to anesthetics.     Plan: admit to inpatient, NPO, IV fluids, IV pain medications, antiemetics, IV zosyn, surgery to see later today.  Cleared for surgery/anesthesia.         Leucocytosis    Assessment: likely due to acute cholecystitis    Plan: repeat per surgery discretion      Nausea and vomiting    Assessment: likely due to his acute cholecystitis and suspect he likely had symptomatic cholelithiasis prior to this acute event    Plan: antiemetics      Diarrhea    Assessment: suspect this is most likely due to cholelithiasis or cholecystitis.  Will need to monitor however and make sure he is able to take good po and his diarrhea slows.    Plan: monitor for now      Hyperlipidemia LDL goal <100    Assessment: chronic    Plan: resume  meds at discharge      Tobacco use disorder    Assessment: chronic    Plan: no acute intervention      Controlled type 2 diabetes mellitus without complication, without long-term current use of insulin (H)    Assessment: has been well controlled on po meds    Plan: check sugars every 4 hours and cover with novolog correction scale    DVT Prophylaxis: Pneumatic Compression Devices  Code Status: Full Code    Disposition: Expected discharge in 2 days once cholecystectomy completed, vomiting and diarrhea controlled, tolerating advancing diet.    Ramon Regalado MD    Primary Care Physician   Bill Panda MD    Chief Complaint   Abdominal pain    History is obtained from the patient    History of Present Illness   Tim Gaona is a 52 year old male who presents with abdominal pain.  A couple of weeks ago he had an episode of epigastric abdominal pain after he had gone to a buffet and had fried fish.  The episode lasted about 3 - 4 hours and was associated with bloating and nausea.  The pain at that time radiated through to his back.  Since that time he has felt somewhat bloated with less than normal appetite and starting two days ago.  The pain has not resolved since that and he noticed his pain was worsened with eating.  He has also had diarrhea over the past two days.  Yesterday he had some vomiting and has had ongoing nausea.  The pain continued to worsen and became severe and crampy with radiation to his back.  He then presented to the ED and is now being admitted.      Past Medical History    I have reviewed this patient's medical history and updated it with pertinent information if needed.   Past Medical History:   Diagnosis Date     Diabetes mellitus, type 2 (H)      Obesity, Class I, BMI 30-34.9 10/26/2015       Past Surgical History   I have reviewed this patient's surgical history and updated it with pertinent information if needed.  Past Surgical History:   Procedure Laterality Date     HC  REMOVE TONSILS/ADENOIDS,<11 Y/O  1971    T & A <12y.o.       Prior to Admission Medications   Prior to Admission Medications   Prescriptions Last Dose Informant Patient Reported? Taking?   ASPIRIN PO 11/18/2017 at Unknown time  Yes Yes   Sig: Take 81 mg by mouth daily   Ascorbic Acid (VITAMIN C) 500 MG CAPS 11/18/2017 at Unknown time  Yes Yes   Sig: Take 1 capsule by mouth daily   B Complex Vitamins (VITAMIN B COMPLEX PO) 11/18/2017 at Unknown time  Yes Yes   Sig: Take 1 tablet by mouth daily   blood glucose monitoring (ACCU-CHEK MULTICLIX) lancets   No No   Sig: Use to test blood sugar one time daily or as directed.   blood glucose monitoring (NO BRAND SPECIFIED) meter device kit   No No   Sig: Use to test blood sugar 2 times daily or as directed.   blood glucose monitoring (NO BRAND SPECIFIED) test strip   No No   Sig: Use to test blood sugars one time daily or as directed   glipiZIDE (GLUCOTROL) 5 MG tablet 11/18/2017 at PM  No Yes   Sig: TAKE ONE TABLET BY MOUTH TWICE A DAY BEFORE MEALS   metFORMIN (GLUCOPHAGE) 1000 MG tablet 11/18/2017 at PM  No Yes   Sig: TAKE ONE TABLET BY MOUTH TWICE A DAY WITH MEALS   vitamin E 400 UNIT capsule 11/18/2017 at Unknown time  Yes Yes   Sig: Take 1 capsule by mouth daily      Facility-Administered Medications: None     Allergies   Allergies   Allergen Reactions     Aleve [Naproxen] Itching       Social History   I have reviewed this patient's social history and updated it with pertinent information if needed. Tim Gaona  reports that he has been smoking Cigarettes.  He has a 24.00 pack-year smoking history. He has never used smokeless tobacco. He reports that he does not drink alcohol or use illicit drugs.    Family History   I have reviewed this patient's family history and updated it with pertinent information if needed.   Family History   Problem Relation Age of Onset     Thyroid Disease Mother      Gallbladder Disease Mother      HEART DISEASE Father      Lipids Father       Thyroid Disease Paternal Grandmother      Gallbladder Disease Paternal Grandmother      Thyroid Disease Paternal Grandfather      Gallbladder Disease Sister        Review of Systems   The 10 point Review of Systems is negative other than noted in the HPI or here.     Physical Exam   Temp: 96.8  F (36  C) Temp src: Temporal BP: 133/88 Pulse: 73   Resp: 18 SpO2: 98 % O2 Device: None (Room air)    Vital Signs with Ranges  Temp:  [96.8  F (36  C)] 96.8  F (36  C)  Pulse:  [73-84] 73  Resp:  [18] 18  BP: (133-185)/() 133/88  SpO2:  [98 %] 98 %  225 lbs 0 oz    Constitutional:   awake, alert, cooperative, no apparent distress, and appears stated age     Eyes:   Lids and lashes normal, pupils equal, round and reactive to light, extra ocular muscles intact, sclera clear, conjunctiva normal     ENT:   Normocephalic, without obvious abnormality, atraumatic, sinuses nontender on palpation, external ears without lesions, oral pharynx with moist mucous membranes, tonsils without erythema or exudates, gums normal and good dentition.     Neck:   Supple, symmetrical, trachea midline, no adenopathy, thyroid symmetric, not enlarged and no tenderness, skin normal     Hematologic / Lymphatic:   no cervical lymphadenopathy and no supraclavicular lymphadenopathy     Back:   Symmetric, no curvature, spinous processes are non-tender on palpation, paraspinous muscles are non-tender on palpation, no costal vertebral tenderness     Lungs:   No increased work of breathing, good air exchange, clear to auscultation bilaterally, no crackles or wheezing     Cardiovascular:   Normal apical impulse, regular rate and rhythm, normal S1 and S2, no S3 or S4, and no murmur noted     Abdomen:   normal bowel sounds, firm but non-distended, mild RUQ tenderness without guarding or rebound, no masses palpated, no hepatosplenomegally     Neurologic:   Awake, alert, oriented to name, place and time.  Cranial nerves II-XII are grossly intact.  Motor is  5 out of 5 bilaterally.  Sensory is intact.         Data   Data reviewed today:  I personally reviewed no images or EKG's today.    Recent Labs  Lab 11/18/17  2348   WBC 12.7*   HGB 14.0   MCV 92         POTASSIUM 3.9   CHLORIDE 106   CO2 24   BUN 12   CR 0.84   ANIONGAP 6   JENNA 8.9   *   ALBUMIN 3.4   PROTTOTAL 7.1   BILITOTAL 0.5   ALKPHOS 75   ALT 20   AST 10   LIPASE 108       Recent Results (from the past 24 hour(s))   CT Abdomen Pelvis w Contrast    Narrative    CT ABDOMEN PELVIS W CONTRAST   11/19/2017 1:52 AM     HISTORY: Right upper quadrant and epigastric pain.    TECHNIQUE: 97mL Isovue 370 IV were administered. After contrast  administration, volumetric helical sections were acquired from the  lung bases to the ischial tuberosities. Coronal images were also  reconstructed. Radiation dose for this scan was reduced using  automated exposure control, adjustment of the mA and/or kV according  to patient size, or iterative reconstruction technique.    COMPARISON: None.     FINDINGS: There is diffuse gallbladder wall thickening, as well as  probable sludge and/or stones within the gallbladder. A few small  calcified granulomas in the spleen. A 0.7 cm low-density lesion in the  interpolar region of the right kidney likely represents a cortical  cyst, but is too small for definitive characterization. The liver,  spleen, adrenal glands, pancreas, and kidneys are otherwise  unremarkable. No hydronephrosis. Mild atherosclerotic aortoiliac  calcification. No bowel obstruction. Unremarkable appendix. No free  fluid in the pelvis. Small calcified granuloma in the right lower lobe  of the lung. Mild scarring or atelectasis at both lung bases  posteriorly.      Impression    IMPRESSION: Diffuse gallbladder wall thickening with probable sludge  and/or stones within the gallbladder. Acute cholecystitis could have  this appearance. Please clinically correlate. Right upper quadrant  ultrasound may be  helpful for further evaluation.

## 2017-11-19 NOTE — ED PROVIDER NOTES
History     Chief Complaint   Patient presents with     Abdominal Pain     HPI  Tim Gaona is a 52 year old male who density emergency room with his most significant concerns of upper abdominal pain that has been on and off for last 2-3 days.  He states that he had an episode about 3 weeks ago of chest pain in the upper abdomen but resolved after a couple hours and he never got checked for this.  He states that the abdominal pain as a cramping type pain that comes and goes in intensity but is otherwise constantly there.  He also has had nausea and vomiting but has had no vomiting today.  He denies any bloody emesis.  He admits to diarrhea that started earlier this morning.  He states the diarrhea is watery and nonbloody.  He has not noted any black stools.  Patient states that he is a diabetic but otherwise healthy.  Last moment she smoked and he stated that he smokes approximately pack and a half a day.  He admits to allergic reaction to Aleve in the past causing itching all over his body.  Patient states that his glucose levels run in the 140s to 160 range typically.  He denies starting any new medications recently.  He denies any recent fall or injury.  He is not aware of anyone else being ill similarly to him that he has been exposed to.  He's had no previous abdominal surgeries in the past.    Problem List:    Patient Active Problem List    Diagnosis Date Noted     Acute cholecystitis 11/19/2017     Priority: Medium     Leucocytosis 11/19/2017     Priority: Medium     Nausea and vomiting 11/19/2017     Priority: Medium     Diarrhea 11/19/2017     Priority: Medium     Controlled type 2 diabetes mellitus without complication, without long-term current use of insulin (H) 06/05/2017     Priority: Medium     Tobacco use disorder 05/18/2016     Priority: Medium     Obesity, Class I, BMI 30-34.9 10/26/2015     Priority: Medium     Hyperlipidemia LDL goal <100 04/03/2012     Priority: Medium        Past Medical  History:    Past Medical History:   Diagnosis Date     Diabetes mellitus, type 2 (H)      Obesity, Class I, BMI 30-34.9 10/26/2015       Past Surgical History:    Past Surgical History:   Procedure Laterality Date     HC REMOVE TONSILS/ADENOIDS,<11 Y/O  1971    T & A <12y.o.       Family History:    Family History   Problem Relation Age of Onset     Thyroid Disease Mother      Gallbladder Disease Mother      HEART DISEASE Father      Lipids Father      Thyroid Disease Paternal Grandmother      Gallbladder Disease Paternal Grandmother      Thyroid Disease Paternal Grandfather      Gallbladder Disease Sister        Social History:  Marital Status:   [2]  Social History   Substance Use Topics     Smoking status: Current Every Day Smoker     Packs/day: 0.75     Years: 32.00     Types: Cigarettes     Smokeless tobacco: Never Used      Comment: started age 20     Alcohol use No        Medications:      glipiZIDE (GLUCOTROL) 5 MG tablet   metFORMIN (GLUCOPHAGE) 1000 MG tablet   ASPIRIN PO   Ascorbic Acid (VITAMIN C) 500 MG CAPS   vitamin E 400 UNIT capsule   B Complex Vitamins (VITAMIN B COMPLEX PO)   blood glucose monitoring (NO BRAND SPECIFIED) meter device kit   blood glucose monitoring (ACCU-CHEK MULTICLIX) lancets   blood glucose monitoring (NO BRAND SPECIFIED) test strip       Allergies   Allergen Reactions     Aleve [Naproxen] Itching       Immunization History   Administered Date(s) Administered     DT (PEDS <7y) 09/14/1971     MMR 11/28/1975     TD (ADULT, 7+) 12/21/1983     TDAP Vaccine (Adacel) 03/30/2009       Review of Systems   Constitutional: Positive for activity change, appetite change and fatigue. Negative for chills, diaphoresis, fever and unexpected weight change.   HENT: Positive for congestion. Negative for ear pain, trouble swallowing and voice change.    Eyes: Negative.    Respiratory: Negative.  Negative for cough, chest tightness and shortness of breath.    Cardiovascular: Negative for  chest pain, palpitations and leg swelling.   Gastrointestinal: Positive for abdominal distention, abdominal pain, diarrhea (Watery starting this AM), nausea and vomiting. Negative for anal bleeding, blood in stool, constipation and rectal pain.   Endocrine: Positive for polyuria. Negative for polydipsia and polyphagia.   Genitourinary: Positive for frequency. Negative for dysuria and flank pain.   Musculoskeletal: Negative.    Skin: Negative for color change, rash and wound.   Neurological: Negative.    Psychiatric/Behavioral: Negative.    All other systems reviewed and are negative.      Physical Exam   BP: (!) 185/106  Pulse: 84  Temp: 96.8  F (36  C)  Resp: 18  Weight: 102.1 kg (225 lb)  SpO2: 98 %      Physical Exam   Constitutional: He is oriented to person, place, and time. He appears well-developed and well-nourished. He appears distressed.   HENT:   Head: Normocephalic and atraumatic.   Mouth/Throat: Oropharynx is clear and moist.   Eyes: EOM are normal. Pupils are equal, round, and reactive to light.   Neck: Normal range of motion. Neck supple.   Cardiovascular: Normal rate, normal heart sounds and intact distal pulses.  Exam reveals no friction rub.    No murmur heard.  Pulmonary/Chest: Effort normal. No respiratory distress.   Abdominal: Soft. He exhibits distension. He exhibits no mass. Bowel sounds are increased. There is tenderness in the right upper quadrant and epigastric area. There is rebound and guarding. There is no CVA tenderness.       Musculoskeletal: Normal range of motion.   Neurological: He is alert and oriented to person, place, and time.   Skin: Skin is warm and intact. No rash noted. He is not diaphoretic. No pallor.   Psychiatric: His speech is normal and behavior is normal. Thought content normal. His mood appears anxious. Cognition and memory are normal.   Nursing note and vitals reviewed.      ED Course     ED Course     Procedures               Critical Care time:  none                Results for orders placed or performed during the hospital encounter of 11/18/17 (from the past 24 hour(s))   CBC with platelets differential   Result Value Ref Range    WBC 12.7 (H) 4.0 - 11.0 10e9/L    RBC Count 4.45 4.4 - 5.9 10e12/L    Hemoglobin 14.0 13.3 - 17.7 g/dL    Hematocrit 40.8 40.0 - 53.0 %    MCV 92 78 - 100 fl    MCH 31.5 26.5 - 33.0 pg    MCHC 34.3 31.5 - 36.5 g/dL    RDW 12.6 10.0 - 15.0 %    Platelet Count 170 150 - 450 10e9/L    Diff Method Automated Method     % Neutrophils 76.8 %    % Lymphocytes 14.5 %    % Monocytes 7.1 %    % Eosinophils 1.2 %    % Basophils 0.2 %    % Immature Granulocytes 0.2 %    Absolute Neutrophil 9.8 (H) 1.6 - 8.3 10e9/L    Absolute Lymphocytes 1.8 0.8 - 5.3 10e9/L    Absolute Monocytes 0.9 0.0 - 1.3 10e9/L    Absolute Eosinophils 0.2 0.0 - 0.7 10e9/L    Absolute Basophils 0.0 0.0 - 0.2 10e9/L    Abs Immature Granulocytes 0.0 0 - 0.4 10e9/L   CRP inflammation   Result Value Ref Range    CRP Inflammation 124.0 (H) 0.0 - 8.0 mg/L   Comprehensive metabolic panel   Result Value Ref Range    Sodium 136 133 - 144 mmol/L    Potassium 3.9 3.4 - 5.3 mmol/L    Chloride 106 94 - 109 mmol/L    Carbon Dioxide 24 20 - 32 mmol/L    Anion Gap 6 3 - 14 mmol/L    Glucose 126 (H) 70 - 99 mg/dL    Urea Nitrogen 12 7 - 30 mg/dL    Creatinine 0.84 0.66 - 1.25 mg/dL    GFR Estimate >90 >60 mL/min/1.7m2    GFR Estimate If Black >90 >60 mL/min/1.7m2    Calcium 8.9 8.5 - 10.1 mg/dL    Bilirubin Total 0.5 0.2 - 1.3 mg/dL    Albumin 3.4 3.4 - 5.0 g/dL    Protein Total 7.1 6.8 - 8.8 g/dL    Alkaline Phosphatase 75 40 - 150 U/L    ALT 20 0 - 70 U/L    AST 10 0 - 45 U/L   Lipase   Result Value Ref Range    Lipase 108 73 - 393 U/L   CT Abdomen Pelvis w Contrast    Narrative    CT ABDOMEN PELVIS W CONTRAST   11/19/2017 1:52 AM     HISTORY: Right upper quadrant and epigastric pain.    TECHNIQUE: 97mL Isovue 370 IV were administered. After contrast  administration, volumetric helical sections were  acquired from the  lung bases to the ischial tuberosities. Coronal images were also  reconstructed. Radiation dose for this scan was reduced using  automated exposure control, adjustment of the mA and/or kV according  to patient size, or iterative reconstruction technique.    COMPARISON: None.     FINDINGS: There is diffuse gallbladder wall thickening, as well as  probable sludge and/or stones within the gallbladder. A few small  calcified granulomas in the spleen. A 0.7 cm low-density lesion in the  interpolar region of the right kidney likely represents a cortical  cyst, but is too small for definitive characterization. The liver,  spleen, adrenal glands, pancreas, and kidneys are otherwise  unremarkable. No hydronephrosis. Mild atherosclerotic aortoiliac  calcification. No bowel obstruction. Unremarkable appendix. No free  fluid in the pelvis. Small calcified granuloma in the right lower lobe  of the lung. Mild scarring or atelectasis at both lung bases  posteriorly.      Impression    IMPRESSION: Diffuse gallbladder wall thickening with probable sludge  and/or stones within the gallbladder. Acute cholecystitis could have  this appearance. Please clinically correlate. Right upper quadrant  ultrasound may be helpful for further evaluation.     Medications ordered to be administered in the ER:    Medications   0.9% sodium chloride BOLUS (0 mLs Intravenous Stopped 11/19/17 0120)     Followed by   0.9% sodium chloride infusion (1,000 mLs Intravenous New Bag 11/19/17 0251)   ondansetron (ZOFRAN) injection 4 mg (4 mg Intravenous Given 11/18/17 2355)   piperacillin-tazobactam (ZOSYN) infusion 3.375 g (3.375 g Intravenous New Bag 11/19/17 0251)   HYDROmorphone (PF) (DILAUDID) injection 0.5 mg (0.5 mg Intravenous Given 11/19/17 0257)   HYDROmorphone (PF) (DILAUDID) injection 0.5 mg (0.5 mg Intravenous Given 11/19/17 0200)   iopamidol (ISOVUE-370) solution 100 mL (97 mLs Intravenous Given 11/19/17 0151)   sodium chloride  0.9 % bag 500mL for CT scan flush use (60 mLs Intravenous Given 11/19/17 0152)   sodium chloride (PF) 0.9% PF flush 3 mL (3 mLs Intracatheter Given 11/19/17 0151)       Assessments & Plan (with Medical Decision Making)  52-year-old male who is a diabetic presents to the ER with concerns of right upper quadrant and epigastric abdominal pain that is present for 3 days associated with diarrhea and nausea and vomiting symptoms.  He had a similar episode of short duration about 3 weeks ago which resolved spontaneously.  Patient's exam reveals significant tenderness with palpation in the epigastric and right upper quadrant of the abdomen.  Laboratory exam and imaging studies as noted above.  Findings consistent with acute cholecystitis with cholelithiasis.  No obstructive findings as evidenced by the normal bilirubin and liver function studies.  Patient is feeling somewhat improved with the IV fluids, antibiotics, and pain medications.  I contacted Dr. Cedillo, surgeon.  I spoke with Dr. Regalado, hospitalist, about need for admission to the hospital for IV fluids, antiemetics, antibiotic therapy, and pain control pending likely cholecystectomy later today per Dr. Cedillo. Dr. Regalado agrees to take over the care of the patient and came to the ER to evaluate the patient and place orders for the hospital floor in the [facility] EMR.     I have reviewed the nursing notes.    I have reviewed the findings, diagnosis, plan and need for hospitalization with the patient.     .    Final diagnoses:   Acute cholecystitis   Abdominal pain, right upper quadrant   Vomiting and diarrhea   Tobacco use disorder   Controlled type 2 diabetes mellitus without complication, without long-term current use of insulin (H)   Obesity, Class I, BMI 30-34.9   Leukemoid reaction   Elevated C-reactive protein (CRP)       11/18/2017   [facility] EMERGENCY DEPARTMENT     Dominic Rangel,   11/19/17 0304

## 2017-11-19 NOTE — ANESTHESIA CARE TRANSFER NOTE
Patient: Tim Gaona    Procedure(s):  Laparoscopic Cholecystectomy - Wound Class: III-Contaminated    Diagnosis: Acute cholecystitis  Diagnosis Additional Information: No value filed.    Anesthesia Type:   General, RSI, ETT     Note:  Airway :Face Mask  Patient transferred to:PACU  Comments: Pt woke up very confused and trying to get out of bed, precedex given IV which helped a little, security was called for assistance.Handoff Report: Identifed the Patient, Identified the Reponsible Provider, Reviewed the pertinent medical history, Discussed the surgical course, Reviewed Intra-OP anesthesia mangement and issues during anesthesia, Set expectations for post-procedure period and Allowed opportunity for questions and acknowledgement of understanding      Vitals: (Last set prior to Anesthesia Care Transfer)    CRNA VITALS  11/19/2017 1220 - 11/19/2017 1312      11/19/2017             Pulse: 113    SpO2: 99 %                Electronically Signed By: YOLI Robertson CRNA  November 19, 2017  1:12 PM

## 2017-11-19 NOTE — PLAN OF CARE
Problem: Cholecystectomy (Adult)  Goal: Anesthesia/Sedation Recovery  Cared for patient from [id number]. OR from [id number]. Resumed Care from [id number]    Patient will verbalize pain as tolerable.     VSS. AO x 4. Post operatively lethargic. Able to verbalize needs. Up with SBA. Patient consistently rating pain > 5/10 in RUQ. Medicated preoperatively with 1 mg of Dilaudid and 30 mg IV Toradol with verbalization of relief of symptoms.     Post- op Assessment Abdomen is soft and tender- surgical site.Hypoactive bowel sounds in all quadrants. Denies nausea/vomiting. Denies flatus. Tolerating clear liquids without complication. Patient slept from [id number], awakening to voice then falling back asleep. Denied pain at that time.     Continue to monitor pain and intervene as necessary to promote comfort.       Laurie Gosselin, RN

## 2017-11-20 NOTE — ANESTHESIA POSTPROCEDURE EVALUATION
Patient: Tim Gaona    Procedure(s):  Laparoscopic Cholecystectomy - Wound Class: III-Contaminated    Diagnosis:Acute cholecystitis  Diagnosis Additional Information: No value filed.    Anesthesia Type:  General, RSI, ETT    Note:  Anesthesia Post Evaluation    Patient location during evaluation: Phase 2 and Bedside  Patient participation: Able to fully participate in evaluation  Level of consciousness: awake  Pain management: adequate  Airway patency: patent  Cardiovascular status: acceptable and hemodynamically stable  Respiratory status: acceptable, room air and nonlabored ventilation  Hydration status: stable  PONV: none     Anesthetic complications: None    Comments: Patient was happy with the anesthesia care received and no anesthesia related complications were noted.  I will follow up with the patient again if it is needed.        Last vitals:  Vitals:    11/19/17 1500 11/19/17 1530 11/19/17 1639   BP: 136/82 123/74 110/72   Pulse: 99 96 93   Resp: 16 14 16   Temp: 98.5  F (36.9  C) 97.8  F (36.6  C) 96  F (35.6  C)   SpO2:  95% 95%         Electronically Signed By: YOLI Robertson CRNA  November 19, 2017  8:48 PM

## 2017-11-20 NOTE — PROGRESS NOTES
S-(situation): Patient discharged to home via wheelchair with spouse    B-(background): s/p lap allan for acute cholecystitis    A-(assessment): Abdominal lap sites x4 covered with steri-strips, CDI.  Pain tolerable without any pain medication given yet, sent home with filled Rx for Percocet for pain.  Tolerating PO well, voided without problems.  A&O, calm and cooperative.    R-(recommendations): Discharge instructions reviewed with patient and spouse. Listed belongings gathered and returned to patient.          Discharge Nursing Criteria:     Care Plan and Patient education resolved: Yes    New Medications- pt has been educated about purpose and side effects: Yes    Vaccines  Pneumonia Vaccine verified at discharge: No (was outpatient in a bed status)  Influenza status verified at discharge:  No (was outpatient in a bed status)    MISC  Prescriptions if needed, hard copies sent with patient  YES  Home and hospital aquired medications returned to patient: NA  Medication Bin checked and emptied on discharge Yes  Patient reports post-discharge pain management plan is effective: Yes

## 2017-11-21 LAB — COPATH REPORT: NORMAL

## 2017-11-28 ENCOUNTER — OFFICE VISIT (OUTPATIENT)
Dept: SURGERY | Facility: CLINIC | Age: 52
End: 2017-11-28
Payer: COMMERCIAL

## 2017-11-28 VITALS
SYSTOLIC BLOOD PRESSURE: 122 MMHG | DIASTOLIC BLOOD PRESSURE: 82 MMHG | BODY MASS INDEX: 31.71 KG/M2 | HEART RATE: 100 BPM | WEIGHT: 221 LBS | TEMPERATURE: 97.6 F | OXYGEN SATURATION: 98 %

## 2017-11-28 DIAGNOSIS — Z90.49 S/P LAPAROSCOPIC CHOLECYSTECTOMY: Primary | ICD-10-CM

## 2017-11-28 DIAGNOSIS — Z98.890 POST-OPERATIVE STATE: ICD-10-CM

## 2017-11-28 PROCEDURE — 99024 POSTOP FOLLOW-UP VISIT: CPT | Performed by: SURGERY

## 2017-11-28 ASSESSMENT — PAIN SCALES - GENERAL: PAINLEVEL: NO PAIN (1)

## 2017-11-28 NOTE — PROGRESS NOTES
[facility] [place] FOLLOW-UP NOTE  GENERAL SURGERY    PCP: Bill Panda         Assessment and Plan:   Assessment:   Tim Gaona is a 52 year old male who presented post operatively from 11/18/2017 lap allan and is doing very well.       ICD-10-CM    1. S/P laparoscopic cholecystectomy Z90.49    2. Post-operative state Z98.890        Plan:  -may return to work with lifting restriction < 15lbs until 6 weeks from the surgery date.  -reviewed pathology result with pt  -Follow up prn           Subjective:     Tim Gaona is a 52 year old male who presents post operatively from 11/18/2017 lap allan. Pain has been controled. Currently is not using pain medications. Eating a Regular and having regular bladder/bowel function. Overall doing very well.  Neg nausea; neg vomiting           Objective:     /82 (BP Location: Right arm, Patient Position: Sitting, Cuff Size: Adult Large)  Pulse 100  Temp 97.6  F (36.4  C) (Temporal)  Wt 221 lb (100.2 kg)  SpO2 98%  BMI 31.71 kg/m2   Constitutional: Awake, alert, in no acute distress.  Respiratory: Non-labored.   Cardiovascular: Regular rate and rhythm.   Abdomen: soft, ND, NT. Incision is Healing well.    [facility] Cedillo, DO  [facility]

## 2017-11-28 NOTE — NURSING NOTE
"Chief Complaint   Patient presents with     Surgical Followup     Laparoscopic cholecystectomy 11/19/2017       Initial There were no vitals taken for this visit. Estimated body mass index is 32.28 kg/(m^2) as calculated from the following:    Height as of 11/19/17: 5' 10\" (1.778 m).    Weight as of 11/18/17: 225 lb (102.1 kg).  Medication Reconciliation: complete    Type of surgery/procedure:  Laparoscopic cholecystectomy   Date of surgery:  11/19/2017  Are you experiencing pain in surgical area? No  Have you had a temperature since surgery? No  Incision closed with glue  Appearance: dry and intact  Drainage: No  Are there any other problems you would like to discuss?  no  Reviewed incision care with the patient? Dr. Cedillo to look at    "

## 2017-11-28 NOTE — MR AVS SNAPSHOT
"              After Visit Summary   2017    Tim Gaona    MRN: [id number]           Patient Information     Date Of Birth          1965        Visit Information        Provider Department      2017 9:00 AM Farhana Cedillo MD [facility]        Today's Diagnoses     S/P laparoscopic cholecystectomy    -  1    Post-operative state           Follow-ups after your visit        Who to contact     If you have questions or need follow up information about today's clinic visit or your schedule please contact [facility] directly at [phone].  Normal or non-critical lab and imaging results will be communicated to you by [employer], letter or phone within 4 business days after the clinic has received the results. If you do not hear from us within 7 days, please contact the clinic through [facility] or phone. If you have a critical or abnormal lab result, we will notify you by phone as soon as possible.  Submit refill requests through [employer] or call your pharmacy and they will forward the refill request to us. Please allow 3 business days for your refill to be completed.          Additional Information About Your Visit        MyChart Information     [employer] lets you send messages to your doctor, view your test results, renew your prescriptions, schedule appointments and more. To sign up, go to [email or web address] . Click on \"Log in\" on the left side of the screen, which will take you to the Welcome page. Then click on \"Sign up Now\" on the right side of the page.     You will be asked to enter the access code listed below, as well as some personal information. Please follow the directions to create your username and password.     Your access code is: [email or web address]  Expires: 2017 10:27 AM     Your access code will  in 90 days. If you need help or a new code, please call your [facility] or [phone].        Care EveryWhere ID     This is your Care EveryWhere " ID. This could be used by other organizations to access your [facility] medical records  [id number]        Your Vitals Were     Pulse Temperature Pulse Oximetry BMI (Body Mass Index)          100 97.6  F (36.4  C) (Temporal) 98% 31.71 kg/m2         Blood Pressure from Last 3 Encounters:   11/28/17 122/82   11/19/17 110/72   09/20/17 115/82    Weight from Last 3 Encounters:   11/28/17 221 lb (100.2 kg)   11/18/17 225 lb (102.1 kg)   09/20/17 223 lb 6.4 oz (101.3 kg)              Today, you had the following     No orders found for display       Primary Care Provider Office Phone # Fax #    Bill Panda [phone]       [address] [facility] [address]        Equal Access to Services     [facility]: Hadii roula car Sobala, waaxda luqadaha, qaybta kaalmada nestor, ezequiel beckman . So [facility] [phone].    ATENCIÓN: Si habla español, tiene a dias disposición servicios gratuitos de asistencia lingüística. Gladisame al [phone].    We comply with applicable federal civil rights laws and [place] laws. We do not discriminate on the basis of race, color, national origin, age, disability, sex, sexual orientation, or gender identity.            Thank you!     Thank you for choosing [facility]  for your care. Our goal is always to provide you with excellent care. Hearing back from our patients is one way we can continue to improve our services. Please take a few minutes to complete the written survey that you may receive in the mail after your visit with us. Thank you!             Your Updated Medication List - Protect others around you: Learn how to safely use, store and throw away your medicines at www.disposemymeds.org.          This list is accurate as of: 11/28/17  9:26 AM.  Always use your most recent med list.                   Brand Name Dispense Instructions for use Diagnosis    ASPIRIN PO      Take 81 mg by mouth daily        blood  glucose monitoring lancets     1 Box    Use to test blood sugar one time daily or as directed.    Type 2 diabetes mellitus without complication, without long-term current use of insulin (H)       blood glucose monitoring meter device kit    no brand specified    1 kit    Use to test blood sugar 2 times daily or as directed.    Controlled type 2 diabetes mellitus without complication, without long-term current use of insulin (H)       blood glucose monitoring test strip    no brand specified    100 strip    Use to test blood sugars one time daily or as directed    Type 2 diabetes mellitus without complication, without long-term current use of insulin (H)       glipiZIDE 5 MG tablet    GLUCOTROL    60 tablet    TAKE ONE TABLET BY MOUTH TWICE A DAY BEFORE MEALS    Type 2 diabetes mellitus without complication, unspecified long term insulin use status (H)       ibuprofen 600 MG tablet    ADVIL/MOTRIN    30 tablet    Take 1 tablet (600 mg) by mouth every 6 hours as needed for pain (mild)    Acute cholecystitis       metFORMIN 1000 MG tablet    GLUCOPHAGE    180 tablet    TAKE ONE TABLET BY MOUTH TWICE A DAY WITH MEALS    Controlled type 2 diabetes mellitus without complication, without long-term current use of insulin (H)       oxyCODONE-acetaminophen 5-325 MG per tablet    PERCOCET    20 tablet    Take 1-2 tablets by mouth every 4 hours as needed for pain (moderate to severe)    Acute cholecystitis, Abdominal pain, right upper quadrant       VITAMIN B COMPLEX PO      Take 1 tablet by mouth daily        Vitamin C 500 MG Caps      Take 1 capsule by mouth daily        vitamin E 400 UNIT capsule      Take 1 capsule by mouth daily

## 2017-12-22 ENCOUNTER — OFFICE VISIT (OUTPATIENT)
Dept: FAMILY MEDICINE | Facility: OTHER | Age: 52
End: 2017-12-22
Payer: COMMERCIAL

## 2017-12-22 VITALS
OXYGEN SATURATION: 97 % | RESPIRATION RATE: 20 BRPM | TEMPERATURE: 97 F | WEIGHT: 225 LBS | SYSTOLIC BLOOD PRESSURE: 118 MMHG | HEART RATE: 94 BPM | DIASTOLIC BLOOD PRESSURE: 82 MMHG | BODY MASS INDEX: 32.28 KG/M2

## 2017-12-22 DIAGNOSIS — J01.90 ACUTE SINUSITIS WITH SYMPTOMS > 10 DAYS: Primary | ICD-10-CM

## 2017-12-22 PROCEDURE — 99213 OFFICE O/P EST LOW 20 MIN: CPT | Performed by: NURSE PRACTITIONER

## 2017-12-22 NOTE — PATIENT INSTRUCTIONS
Try Pseudoephedrine (Sudafed) every 4-6 hours for sinus congestion, runny nose, ear pain.  This is available without a prescription, but you do need to ask for it behind the pharmacy counter.     Take the Augmentin twice a day for 2 weeks.     Follow up if symptoms fail to resolve as expected.       Sinusitis (Antibiotic Treatment)    The sinuses are air-filled spaces within the bones of the face. They connect to the inside of the nose. Sinusitis is an inflammation of the tissue lining the sinus cavity. Sinus inflammation can occur during a cold. It can also be due to allergies to pollens and other particles in the air. Sinusitis can cause symptoms of sinus congestion and fullness. A sinus infection causes fever, headache and facial pain. There is often green or yellow drainage from the nose or into the back of the throat (post-nasal drip). You have been given antibiotics to treat this condition.  Home care:    Take the full course of antibiotics as instructed. Do not stop taking them, even if you feel better.    Drink plenty of water, hot tea, and other liquids. This may help thin mucus. It also may promote sinus drainage.    Heat may help soothe painful areas of the face. Use a towel soaked in hot water. Or,  the shower and direct the hot spray onto your face. Using a vaporizer along with a menthol rub at night may also help.     An expectorant containing guaifenesin may help thin the mucus and promote drainage from the sinuses.    Over-the-counter decongestants may be used unless a similar medicine was prescribed. Nasal sprays work the fastest. Use one that contains phenylephrine or oxymetazoline. First blow the nose gently. Then use the spray. Do not use these medicines more often than directed on the label or symptoms may get worse. You may also use tablets containing pseudoephedrine. Avoid products that combine ingredients, because side effects may be increased. Read labels. You can also ask the  pharmacist for help. (NOTE: Persons with high blood pressure should not use decongestants. They can raise blood pressure.)    Over-the-counter antihistamines may help if allergies contributed to your sinusitis.      Do not use nasal rinses or irrigation during an acute sinus infection, unless told to by your health care provider. Rinsing may spread the infection to other sinuses.    Use acetaminophen or ibuprofen to control pain, unless another pain medicine was prescribed. (If you have chronic liver or kidney disease or ever had a stomach ulcer, talk with your doctor before using these medicines. Aspirin should never be used in anyone under 18 years of age who is ill with a fever. It may cause severe liver damage.)    Don't smoke. This can worsen symptoms.  Follow-up care  Follow up with your healthcare provider or our staff if you are not improving within the next week.  When to seek medical advice  Call your healthcare provider if any of these occur:    Facial pain or headache becoming more severe    Stiff neck    Unusual drowsiness or confusion    Swelling of the forehead or eyelids    Vision problems, including blurred or double vision    Fever of 100.4 F (38 C) or higher, or as directed by your healthcare provider    Seizure    Breathing problems    Symptoms not resolving within 10 days  Date Last Reviewed: 4/13/2015 2000-2017 [employer]. [address] [place] [address], [place] [address]. All rights reserved. This information is not intended as a substitute for professional medical care. Always follow your healthcare professional's instructions.

## 2017-12-22 NOTE — NURSING NOTE
"Chief Complaint   Patient presents with     Throat Pain     possible sinus inf     Health Maintenance     declines flu shot       Initial /82  Pulse 94  Temp 97  F (36.1  C) (Tympanic)  Resp 20  Wt 225 lb (102.1 kg)  SpO2 97%  BMI 32.28 kg/m2 Estimated body mass index is 32.28 kg/(m^2) as calculated from the following:    Height as of 11/19/17: 5' 10\" (1.778 m).    Weight as of this encounter: 225 lb (102.1 kg).  Medication Reconciliation: complete   ................Bay Andersen LPN,   December 22, 2017,      10:57 AM,   [facility]    "

## 2017-12-22 NOTE — PROGRESS NOTES
SUBJECTIVE:   Tim Gaona is a 52 year old male who presents to clinic today for the following health issues:      RESPIRATORY SYMPTOMS      Duration: 2 months, worsened over the last 2-3 wks    Description  nasal congestion, rhinorrhea, sore throat, facial pain/pressure, cough, ear pain right and headache    Severity: moderate    Accompanying signs and symptoms: None    History (predisposing factors):  tobacco abuse    Precipitating or alleviating factors: None    Therapies tried:  rest and fluids,     Cough, Drainage   Ill contacts: none known  No fevers/chill   Problem list and histories reviewed & adjusted, as indicated.  Additional history: none    Patient Active Problem List   Diagnosis     Hyperlipidemia LDL goal <100     Obesity, Class I, BMI 30-34.9     Tobacco use disorder     Controlled type 2 diabetes mellitus without complication, without long-term current use of insulin (H)     Acute cholecystitis     Leucocytosis     Nausea and vomiting     Diarrhea     Acute calculous cholecystitis     Past Surgical History:   Procedure Laterality Date     HC REMOVE TONSILS/ADENOIDS,<11 Y/O  1971    T & A <12y.o.     LAPAROSCOPIC CHOLECYSTECTOMY N/A 11/19/2017    Procedure: LAPAROSCOPIC CHOLECYSTECTOMY;  Laparoscopic Cholecystectomy;  Surgeon: Farhana Cedillo MD;  Location:  OR       Social History   Substance Use Topics     Smoking status: Current Every Day Smoker     Packs/day: 0.75     Years: 32.00     Types: Cigarettes     Smokeless tobacco: Never Used      Comment: started age 20     Alcohol use No     Family History   Problem Relation Age of Onset     Thyroid Disease Mother      Gallbladder Disease Mother      HEART DISEASE Father      Lipids Father      Thyroid Disease Paternal Grandmother      Gallbladder Disease Paternal Grandmother      Thyroid Disease Paternal Grandfather      Gallbladder Disease Sister          Current Outpatient Prescriptions   Medication Sig Dispense Refill     ibuprofen  (ADVIL/MOTRIN) 600 MG tablet Take 1 tablet (600 mg) by mouth every 6 hours as needed for pain (mild) 30 tablet 0     glipiZIDE (GLUCOTROL) 5 MG tablet TAKE ONE TABLET BY MOUTH TWICE A DAY BEFORE MEALS 60 tablet 3     metFORMIN (GLUCOPHAGE) 1000 MG tablet TAKE ONE TABLET BY MOUTH TWICE A DAY WITH MEALS 180 tablet 3     blood glucose monitoring (NO BRAND SPECIFIED) meter device kit Use to test blood sugar 2 times daily or as directed. 1 kit 0     blood glucose monitoring (ACCU-CHEK MULTICLIX) lancets Use to test blood sugar one time daily or as directed. 1 Box 11     blood glucose monitoring (NO BRAND SPECIFIED) test strip Use to test blood sugars one time daily or as directed 100 strip 11     ASPIRIN PO Take 81 mg by mouth daily       Ascorbic Acid (VITAMIN C) 500 MG CAPS Take 1 capsule by mouth daily       vitamin E 400 UNIT capsule Take 1 capsule by mouth daily       B Complex Vitamins (VITAMIN B COMPLEX PO) Take 1 tablet by mouth daily       Allergies   Allergen Reactions     Aleve [Naproxen] Itching     BP Readings from Last 3 Encounters:   12/22/17 118/82   11/28/17 122/82   11/19/17 110/72    Wt Readings from Last 3 Encounters:   12/22/17 225 lb (102.1 kg)   11/28/17 221 lb (100.2 kg)   11/18/17 225 lb (102.1 kg)                        Reviewed and updated as needed this visit by clinical staffTobacco  Allergies  Meds  Soc Hx      Reviewed and updated as needed this visit by Provider         ROS:  Constitutional, HEENT, cardiovascular, pulmonary, gi and gu systems are negative, except as otherwise noted.      OBJECTIVE:   /82  Pulse 94  Temp 97  F (36.1  C) (Tympanic)  Resp 20  Wt 225 lb (102.1 kg)  SpO2 97%  BMI 32.28 kg/m2  Body mass index is 32.28 kg/(m^2).  GENERAL: healthy, alert and no distress  HENT: normal cephalic/atraumatic, both ears: clear effusion, nasal mucosa edematous , oral mucous membranes moist, tonsillar erythema and sinuses: maxillary, frontal tenderness on right  NECK: no  adenopathy, no asymmetry, masses, or scars and thyroid normal to palpation  RESP: lungs clear to auscultation - no rales, rhonchi or wheezes  CV: regular rate and rhythm, normal S1 S2, no S3 or S4, no murmur, click or rub, no peripheral edema and peripheral pulses strong  MS: no gross musculoskeletal defects noted, no edema    Diagnostic Test Results:  none     ASSESSMENT/PLAN:         1. Acute sinusitis with symptoms > 10 days  - amoxicillin-clavulanate (AUGMENTIN) 875-125 MG per tablet; Take 1 tablet by mouth 2 times daily  Dispense: 28 tablet; Refill: 0    FUTURE APPOINTMENTS:       - Follow up if symptoms fail to resolve as expected.   See Patient Instructions    YOLI Guerrero [facility]

## 2017-12-22 NOTE — MR AVS SNAPSHOT
After Visit Summary   12/22/2017    Tim Gaona    MRN: [id number]           Patient Information     Date Of Birth          1965        Visit Information        Provider Department      12/22/2017 10:20 AM Sandy Francois APRN [facility]        Today's Diagnoses     Acute sinusitis with symptoms > 10 days    -  1      Care Instructions    Try Pseudoephedrine (Sudafed) every 4-6 hours for sinus congestion, runny nose, ear pain.  This is available without a prescription, but you do need to ask for it behind the pharmacy counter.     Take the Augmentin twice a day for 2 weeks.     Follow up if symptoms fail to resolve as expected.       Sinusitis (Antibiotic Treatment)    The sinuses are air-filled spaces within the bones of the face. They connect to the inside of the nose. Sinusitis is an inflammation of the tissue lining the sinus cavity. Sinus inflammation can occur during a cold. It can also be due to allergies to pollens and other particles in the air. Sinusitis can cause symptoms of sinus congestion and fullness. A sinus infection causes fever, headache and facial pain. There is often green or yellow drainage from the nose or into the back of the throat (post-nasal drip). You have been given antibiotics to treat this condition.  Home care:    Take the full course of antibiotics as instructed. Do not stop taking them, even if you feel better.    Drink plenty of water, hot tea, and other liquids. This may help thin mucus. It also may promote sinus drainage.    Heat may help soothe painful areas of the face. Use a towel soaked in hot water. Or,  the shower and direct the hot spray onto your face. Using a vaporizer along with a menthol rub at night may also help.     An expectorant containing guaifenesin may help thin the mucus and promote drainage from the sinuses.    Over-the-counter decongestants may be used unless a similar medicine was prescribed. Nasal sprays  work the fastest. Use one that contains phenylephrine or oxymetazoline. First blow the nose gently. Then use the spray. Do not use these medicines more often than directed on the label or symptoms may get worse. You may also use tablets containing pseudoephedrine. Avoid products that combine ingredients, because side effects may be increased. Read labels. You can also ask the pharmacist for help. (NOTE: Persons with high blood pressure should not use decongestants. They can raise blood pressure.)    Over-the-counter antihistamines may help if allergies contributed to your sinusitis.      Do not use nasal rinses or irrigation during an acute sinus infection, unless told to by your health care provider. Rinsing may spread the infection to other sinuses.    Use acetaminophen or ibuprofen to control pain, unless another pain medicine was prescribed. (If you have chronic liver or kidney disease or ever had a stomach ulcer, talk with your doctor before using these medicines. Aspirin should never be used in anyone under 18 years of age who is ill with a fever. It may cause severe liver damage.)    Don't smoke. This can worsen symptoms.  Follow-up care  Follow up with your healthcare provider or our staff if you are not improving within the next week.  When to seek medical advice  Call your healthcare provider if any of these occur:    Facial pain or headache becoming more severe    Stiff neck    Unusual drowsiness or confusion    Swelling of the forehead or eyelids    Vision problems, including blurred or double vision    Fever of 100.4 F (38 C) or higher, or as directed by your healthcare provider    Seizure    Breathing problems    Symptoms not resolving within 10 days  Date Last Reviewed: 4/13/2015 2000-2017 [employer] [address] [place] [address], [place] [address]. All rights reserved. This information is not intended as a substitute for professional medical care. Always follow your healthcare  "professional's instructions.                    Follow-ups after your visit        Who to contact     If you have questions or need follow up information about today's clinic visit or your schedule please contact [facility] directly at [phone].  Normal or non-critical lab and imaging results will be communicated to you by MyChart, letter or phone within 4 business days after the clinic has received the results. If you do not hear from us within 7 days, please contact the clinic through MyChart or phone. If you have a critical or abnormal lab result, we will notify you by phone as soon as possible.  Submit refill requests through [employer] or call your pharmacy and they will forward the refill request to us. Please allow 3 business days for your refill to be completed.          Additional Information About Your Visit        [employer] Information     [employer] lets you send messages to your doctor, view your test results, renew your prescriptions, schedule appointments and more. To sign up, go to [email or web address] . Click on \"Log in\" on the left side of the screen, which will take you to the Welcome page. Then click on \"Sign up Now\" on the right side of the page.     You will be asked to enter the access code listed below, as well as some personal information. Please follow the directions to create your username and password.     Your access code is: [email or web address]  Expires: 3/22/2018 11:13 AM     Your access code will  in 90 days. If you need help or a new code, please call your [facility] or [phone].        Care EveryWhere ID     This is your Care EveryWhere ID. This could be used by other organizations to access your [place] medical records  [id number]        Your Vitals Were     Pulse Temperature Respirations Pulse Oximetry BMI (Body Mass Index)       94 97  F (36.1  C) (Tympanic) 20 97% 32.28 kg/m2        Blood Pressure from Last 3 Encounters:   17 118/82   17 122/82 "   11/19/17 110/72    Weight from Last 3 Encounters:   12/22/17 225 lb (102.1 kg)   11/28/17 221 lb (100.2 kg)   11/18/17 225 lb (102.1 kg)              Today, you had the following     No orders found for display         Today's Medication Changes          These changes are accurate as of: 12/22/17 11:13 AM.  If you have any questions, ask your nurse or doctor.               Start taking these medicines.        Dose/Directions    amoxicillin-clavulanate 875-125 MG per tablet   Commonly known as:  AUGMENTIN   Used for:  Acute sinusitis with symptoms > 10 days   Started by:  Sandy Francois APRN CNP        Dose:  1 tablet   Take 1 tablet by mouth 2 times daily   Quantity:  28 tablet   Refills:  0            Where to get your medicines      These medications were sent to [facility] [place], [place] - [address]  [address], [place] [address]     Phone:  [phone]     amoxicillin-clavulanate 875-125 MG per tablet                Primary Care Provider Office Phone # Fax #    Bill Panda [phone]       [address] [facility] [address]        Equal Access to Services     MARCIN WEEMS AH: Hadii roula romero hadasho Soomaali, waaxda luqadaha, qaybta kaalmada adeegyada, ezequiel sherman. So [facility] [phone].    ATENCIÓN: Si habla español, tiene a dias disposición servicios gratuitos de asistencia lingüística. [facility] [phone].    We comply with applicable federal civil rights laws and [place] laws. We do not discriminate on the basis of race, color, national origin, age, disability, sex, sexual orientation, or gender identity.            Thank you!     Thank you for choosing [facility]  for your care. Our goal is always to provide you with excellent care. Hearing back from our patients is one way we can continue to improve our services. Please take a few minutes to complete the written survey that you may receive in the mail after your visit with  us. Thank you!             Your Updated Medication List - Protect others around you: Learn how to safely use, store and throw away your medicines at www.disposemymeds.org.          This list is accurate as of: 12/22/17 11:13 AM.  Always use your most recent med list.                   Brand Name Dispense Instructions for use Diagnosis    amoxicillin-clavulanate 875-125 MG per tablet    AUGMENTIN    28 tablet    Take 1 tablet by mouth 2 times daily    Acute sinusitis with symptoms > 10 days       ASPIRIN PO      Take 81 mg by mouth daily        blood glucose monitoring lancets     1 Box    Use to test blood sugar one time daily or as directed.    Type 2 diabetes mellitus without complication, without long-term current use of insulin (H)       blood glucose monitoring meter device kit    no brand specified    1 kit    Use to test blood sugar 2 times daily or as directed.    Controlled type 2 diabetes mellitus without complication, without long-term current use of insulin (H)       blood glucose monitoring test strip    no brand specified    100 strip    Use to test blood sugars one time daily or as directed    Type 2 diabetes mellitus without complication, without long-term current use of insulin (H)       glipiZIDE 5 MG tablet    GLUCOTROL    60 tablet    TAKE ONE TABLET BY MOUTH TWICE A DAY BEFORE MEALS    Type 2 diabetes mellitus without complication, unspecified long term insulin use status (H)       ibuprofen 600 MG tablet    ADVIL/MOTRIN    30 tablet    Take 1 tablet (600 mg) by mouth every 6 hours as needed for pain (mild)    Acute cholecystitis       metFORMIN 1000 MG tablet    GLUCOPHAGE    180 tablet    TAKE ONE TABLET BY MOUTH TWICE A DAY WITH MEALS    Controlled type 2 diabetes mellitus without complication, without long-term current use of insulin (H)       VITAMIN B COMPLEX PO      Take 1 tablet by mouth daily        Vitamin C 500 MG Caps      Take 1 capsule by mouth daily        vitamin E 400 UNIT  capsule      Take 1 capsule by mouth daily

## 2018-01-22 DIAGNOSIS — E11.9 TYPE 2 DIABETES MELLITUS WITHOUT COMPLICATION, UNSPECIFIED LONG TERM INSULIN USE STATUS: ICD-10-CM

## 2018-01-22 NOTE — TELEPHONE ENCOUNTER
"Requested Prescriptions   Pending Prescriptions Disp Refills     glipiZIDE (GLUCOTROL) 5 MG tablet [Pharmacy Med Name: GLIPIZIDE 5MG TABS] 60 tablet 3     Sig: TAKE ONE TABLET BY MOUTH TWICE A DAY BEFORE MEALS    Sulfonylurea Agents Passed    1/22/2018  2:31 PM       Passed - Patient's BP is less than 140/90    BP Readings from Last 3 Encounters:   12/22/17 118/82   11/28/17 122/82   11/19/17 110/72                Passed - Patient has documented LDL within the past 12 mos.    Recent Labs   Lab Test  09/20/17   1102   LDL  116*            Passed - Patient has had a Microalbumin in the past 12 mos.    Recent Labs   Lab Test  09/20/17   1109   MICROL  <5   UMALCR  Unable to calculate due to low value            Passed - Patient has documented A1c within the specified period of time.    Recent Labs   Lab Test  09/20/17   1102   A1C  6.5*            Passed - Patient is age 18 or older       Passed - Patient has a recent creatinine (normal) within the past 12 mos.    Recent Labs   Lab Test  11/18/17   2348   CR  0.84            Passed - Patient has had an appointment with authorizing provider within the past 6 mos. or  within next 30 days    Patient had office visit in the last 6 months or has a visit in the next 30 days with authorizing provider.  See \"Patient Info\" tab in inbasket, or \"Choose Columns\" in Meds & Orders section of the refill encounter.              Last Written Prescription Date:  9/12/17  Last Fill Quantity: 60,  # refills: 3   Last Office Visit with [facility], [facility] or [facility] prescribing provider:  12/22/17   Future Office Visit:       "

## 2018-01-23 RX ORDER — GLIPIZIDE 5 MG/1
TABLET ORAL
Qty: 60 TABLET | Refills: 1 | Status: SHIPPED | OUTPATIENT
Start: 2018-01-23 | End: 2018-03-20

## 2018-01-23 NOTE — TELEPHONE ENCOUNTER
Routing refill request to provider for review/approval because:  Labs out of range:  LDL  Patient needs to be seen because:  Needs to establish care with a new provider    Lillian Harper RN  [facility]

## 2018-03-20 DIAGNOSIS — E11.9 TYPE 2 DIABETES MELLITUS WITHOUT COMPLICATION, UNSPECIFIED LONG TERM INSULIN USE STATUS: ICD-10-CM

## 2018-03-20 RX ORDER — GLIPIZIDE 5 MG/1
TABLET ORAL
Qty: 30 TABLET | Refills: 0 | Status: SHIPPED | OUTPATIENT
Start: 2018-03-20 | End: 2018-04-13

## 2018-03-20 RX ORDER — GLIPIZIDE 5 MG/1
TABLET ORAL
Qty: 60 TABLET | Refills: 1 | Status: CANCELLED | OUTPATIENT
Start: 2018-03-20

## 2018-03-20 NOTE — TELEPHONE ENCOUNTER
Routing refill request to provider for review/approval because:  Jessa given x1 and patient did not follow up, please advise (pt is aware that he needs to est care with another PCP)  Rhonda Salas RN

## 2018-03-20 NOTE — TELEPHONE ENCOUNTER
"Requested Prescriptions   Pending Prescriptions Disp Refills     glipiZIDE (GLUCOTROL) 5 MG tablet 60 tablet 1    Sulfonylurea Agents Failed    3/20/2018  8:22 AM       Failed - Patient has documented A1c within the specified period of time.    Recent Labs   Lab Test  09/20/17   1102   A1C  6.5*            Passed - Blood pressure less than 140/90 in past 6 months    BP Readings from Last 3 Encounters:   12/22/17 118/82   11/28/17 122/82   11/19/17 110/72                Passed - Patient has documented LDL within the past 12 mos.    Recent Labs   Lab Test  09/20/17   1102   LDL  116*            Passed - Patient has had a Microalbumin in the past 12 mos.    Recent Labs   Lab Test  09/20/17   1109   MICROL  <5   UMALCR  Unable to calculate due to low value            Passed - Patient is age 18 or older       Passed - Patient has a recent creatinine (normal) within the past 12 mos.    Recent Labs   Lab Test  11/18/17   2348   CR  0.84            Passed - Recent (6 mo) or future (30 days) visit within the authorizing provider's specialty    Patient had office visit in the last 6 months or has a visit in the next 30 days with authorizing provider or within the authorizing provider's specialty.  See \"Patient Info\" tab in inbasket, or \"Choose Columns\" in Meds & Orders section of the refill encounter.            Last Written Prescription Date:  1/23/18  Last Fill Quantity: 60,  # refills: 1   Last office visit: 12/22/2017 with prescribing provider:     Future Office Visit:      "

## 2018-04-13 DIAGNOSIS — E11.9 TYPE 2 DIABETES MELLITUS WITHOUT COMPLICATION, UNSPECIFIED LONG TERM INSULIN USE STATUS: ICD-10-CM

## 2018-04-13 RX ORDER — GLIPIZIDE 5 MG/1
5 TABLET ORAL
Qty: 15 TABLET | Refills: 0 | Status: SHIPPED | OUTPATIENT
Start: 2018-04-13 | End: 2018-05-10

## 2018-04-13 NOTE — TELEPHONE ENCOUNTER
"Requested Prescriptions   Pending Prescriptions Disp Refills     glipiZIDE (GLUCOTROL) 5 MG tablet [Pharmacy Med Name: GLIPIZIDE 5MG TABS] 30 tablet 0     Sig: TAKE ONE TABLET BY MOUTH TWICE A DAY BEFORE MEALS    Sulfonylurea Agents Failed    4/13/2018  8:57 AM       Failed - Patient has documented A1c within the specified period of time.    Recent Labs   Lab Test  09/20/17   1102   A1C  6.5*            Passed - Blood pressure less than 140/90 in past 6 months    BP Readings from Last 3 Encounters:   12/22/17 118/82   11/28/17 122/82   11/19/17 110/72                Passed - Patient has documented LDL within the past 12 mos.    Recent Labs   Lab Test  09/20/17   1102   LDL  116*            Passed - Patient has had a Microalbumin in the past 12 mos.    Recent Labs   Lab Test  09/20/17   1109   MICROL  <5   UMALCR  Unable to calculate due to low value            Passed - Patient is age 18 or older       Passed - Patient has a recent creatinine (normal) within the past 12 mos.    Recent Labs   Lab Test  11/18/17   2348   CR  0.84            Passed - Recent (6 mo) or future (30 days) visit within the authorizing provider's specialty    Patient had office visit in the last 6 months or has a visit in the next 30 days with authorizing provider or within the authorizing provider's specialty.  See \"Patient Info\" tab in inbasket, or \"Choose Columns\" in Meds & Orders section of the refill encounter.              Last Written Prescription Date:  3/20/18  Last Fill Quantity: 30,  # refills: 0 12/22/17  Last Office Visit with [facility] or [facility] prescribing provider:     Future Office Visit:       "

## 2018-04-13 NOTE — TELEPHONE ENCOUNTER
Routing refill request to provider for review/approval because:  Jessa given x1 and patient did not follow up, please advise  T'd up #15 for provider review, as he already has been prescribed multiple months without a PCP.    Will forward to schedulers to schedule patient for OV.  Tsering Rodriguez RN

## 2018-05-07 DIAGNOSIS — E11.9 CONTROLLED TYPE 2 DIABETES MELLITUS WITHOUT COMPLICATION, WITHOUT LONG-TERM CURRENT USE OF INSULIN (H): ICD-10-CM

## 2018-05-07 NOTE — TELEPHONE ENCOUNTER
"METFORMIN HCL 1000MG  Requested Prescriptions   Pending Prescriptions Disp Refills     metFORMIN (GLUCOPHAGE) 1000 MG tablet 180 tablet 3     Sig: Take 1 tablet (1,000 mg) by mouth 2 times daily (with meals)    Biguanide Agents Failed    5/7/2018  8:02 AM       Failed - Patient has documented A1c within the specified period of time.    Recent Labs   Lab Test  09/20/17   1102   A1C  6.5*            Passed - Blood pressure less than 140/90 in past 6 months    BP Readings from Last 3 Encounters:   12/22/17 118/82   11/28/17 122/82   11/19/17 110/72                Passed - Patient has documented LDL within the past 12 mos.    Recent Labs   Lab Test  09/20/17   1102   LDL  116*            Passed - Patient has had a Microalbumin in the past 12 mos.    Recent Labs   Lab Test  09/20/17   1109   MICROL  <5   UMALCR  Unable to calculate due to low value            Passed - Patient is age 10 or older       Passed - Patient's CR is NOT>1.4 OR Patient's EGFR is NOT<45 within past 12 mos.    Recent Labs   Lab Test  11/18/17   2348   GFRESTIMATED  >90   GFRESTBLACK  >90       Recent Labs   Lab Test  11/18/17   2348   CR  0.84            Passed - Patient does NOT have a diagnosis of CHF.       Passed - Recent (6 mo) or future (30 days) visit within the authorizing provider's specialty    Patient had office visit in the last 6 months or has a visit in the next 30 days with authorizing provider or within the authorizing provider's specialty.  See \"Patient Info\" tab in inbasket, or \"Choose Columns\" in Meds & Orders section of the refill encounter.              "

## 2018-05-08 NOTE — TELEPHONE ENCOUNTER
Routing refill request to provider for review/approval because:  Labs out of range:  LDL  Labs not current:  A1C  Patient needs to be seen because:  Needs to establish care with a new provider    Lillian Harper RN  [facility]

## 2018-05-10 ENCOUNTER — OFFICE VISIT (OUTPATIENT)
Dept: FAMILY MEDICINE | Facility: OTHER | Age: 53
End: 2018-05-10
Payer: COMMERCIAL

## 2018-05-10 VITALS
HEIGHT: 69 IN | WEIGHT: 216 LBS | SYSTOLIC BLOOD PRESSURE: 122 MMHG | DIASTOLIC BLOOD PRESSURE: 78 MMHG | TEMPERATURE: 96 F | RESPIRATION RATE: 20 BRPM | OXYGEN SATURATION: 98 % | BODY MASS INDEX: 31.99 KG/M2 | HEART RATE: 93 BPM

## 2018-05-10 DIAGNOSIS — E11.65 TYPE 2 DIABETES MELLITUS WITH HYPERGLYCEMIA, WITHOUT LONG-TERM CURRENT USE OF INSULIN (H): ICD-10-CM

## 2018-05-10 DIAGNOSIS — E78.5 HYPERLIPIDEMIA LDL GOAL <100: ICD-10-CM

## 2018-05-10 DIAGNOSIS — E66.811 OBESITY, CLASS I, BMI 30-34.9: ICD-10-CM

## 2018-05-10 DIAGNOSIS — F17.200 TOBACCO USE DISORDER: ICD-10-CM

## 2018-05-10 DIAGNOSIS — Z76.89 ENCOUNTER TO ESTABLISH CARE WITH NEW DOCTOR: Primary | ICD-10-CM

## 2018-05-10 PROBLEM — E11.9 CONTROLLED TYPE 2 DIABETES MELLITUS WITHOUT COMPLICATION, WITHOUT LONG-TERM CURRENT USE OF INSULIN (H): Status: RESOLVED | Noted: 2017-06-05 | Resolved: 2018-05-10

## 2018-05-10 PROBLEM — D72.829 LEUCOCYTOSIS: Status: RESOLVED | Noted: 2017-11-19 | Resolved: 2018-05-10

## 2018-05-10 PROBLEM — R19.7 DIARRHEA: Status: RESOLVED | Noted: 2017-11-19 | Resolved: 2018-05-10

## 2018-05-10 PROBLEM — R11.2 NAUSEA AND VOMITING: Status: RESOLVED | Noted: 2017-11-19 | Resolved: 2018-05-10

## 2018-05-10 PROBLEM — K81.0 ACUTE CHOLECYSTITIS: Status: RESOLVED | Noted: 2017-11-19 | Resolved: 2018-05-10

## 2018-05-10 PROBLEM — K80.00 ACUTE CALCULOUS CHOLECYSTITIS: Status: RESOLVED | Noted: 2017-11-19 | Resolved: 2018-05-10

## 2018-05-10 LAB — HBA1C MFR BLD: 7.2 % (ref 0–5.6)

## 2018-05-10 PROCEDURE — 36415 COLL VENOUS BLD VENIPUNCTURE: CPT | Performed by: NURSE PRACTITIONER

## 2018-05-10 PROCEDURE — 99214 OFFICE O/P EST MOD 30 MIN: CPT | Performed by: NURSE PRACTITIONER

## 2018-05-10 PROCEDURE — 83036 HEMOGLOBIN GLYCOSYLATED A1C: CPT | Performed by: NURSE PRACTITIONER

## 2018-05-10 RX ORDER — NICOTINE 21 MG/24HR
1 PATCH, TRANSDERMAL 24 HOURS TRANSDERMAL EVERY 24 HOURS
Qty: 30 PATCH | Refills: 1 | Status: SHIPPED | OUTPATIENT
Start: 2018-05-10 | End: 2019-05-20

## 2018-05-10 RX ORDER — GLIPIZIDE 5 MG/1
5 TABLET ORAL
Qty: 180 TABLET | Refills: 1 | Status: SHIPPED | OUTPATIENT
Start: 2018-05-10 | End: 2018-11-20

## 2018-05-10 ASSESSMENT — PAIN SCALES - GENERAL: PAINLEVEL: NO PAIN (0)

## 2018-05-10 NOTE — PROGRESS NOTES
SUBJECTIVE:   Tim Gaona is a 53 year old male who presents to clinic today for the following health issues:      Est Care/Transfer of Care    Patient has previously been under the care of Dr. Panda, who has left the clinic.  Patient requests transfer care to me. Patient medications reconciled, PMH and Problem list reviewed and HM updated.      Diabetes Follow-up     Patient is checking blood sugars: twice daily.    Blood sugar testing frequency justification: Risk of hypoglycemia with medication(s)  Results are as follows:         am - 140         bedtime - 130    Diabetic concerns: None     Symptoms of hypoglycemia (low blood sugar): none     Paresthesias (numbness or burning in feet) or sores: No     Date of last diabetic eye exam: over 5 yrs    BP Readings from Last 2 Encounters:   05/10/18 122/78   12/22/17 118/82     Hemoglobin A1C (%)   Date Value   09/20/2017 6.5 (H)   01/11/2017 6.0     LDL Cholesterol Calculated (mg/dL)   Date Value   09/20/2017 116 (H)   03/30/2009 145 (H)     LDL Cholesterol Direct (mg/dL)   Date Value   05/18/2016 122 (H)   08/01/2014 126         Problem list and histories reviewed & adjusted, as indicated.  Additional history: as documented    Patient Active Problem List   Diagnosis     Hyperlipidemia LDL goal <100     Obesity, Class I, BMI 30-34.9     Tobacco use disorder     Controlled type 2 diabetes mellitus without complication, without long-term current use of insulin (H)     Acute cholecystitis     Leucocytosis     Nausea and vomiting     Diarrhea     Acute calculous cholecystitis     Past Surgical History:   Procedure Laterality Date     HC REMOVE TONSILS/ADENOIDS,<13 Y/O  1971    T & A <12y.o.     LAPAROSCOPIC CHOLECYSTECTOMY N/A 11/19/2017    Procedure: LAPAROSCOPIC CHOLECYSTECTOMY;  Laparoscopic Cholecystectomy;  Surgeon: Cedillo, MD Farhana;  Location:  OR       Social History   Substance Use Topics     Smoking status: Current Every Day Smoker     Packs/day:  0.75     Years: 32.00     Types: Cigarettes     Smokeless tobacco: Never Used      Comment: started age 20     Alcohol use No     Family History   Problem Relation Age of Onset     Thyroid Disease Mother      Gallbladder Disease Mother      HEART DISEASE Father      Lipids Father      Thyroid Disease Paternal Grandmother      Gallbladder Disease Paternal Grandmother      Thyroid Disease Paternal Grandfather      Gallbladder Disease Sister          Current Outpatient Prescriptions   Medication Sig Dispense Refill     Ascorbic Acid (VITAMIN C) 500 MG CAPS Take 1 capsule by mouth daily       ASPIRIN PO Take 81 mg by mouth daily       B Complex Vitamins (VITAMIN B COMPLEX PO) Take 1 tablet by mouth daily       blood glucose monitoring (ACCU-CHEK MULTICLIX) lancets Use to test blood sugar one time daily or as directed. 1 Box 11     blood glucose monitoring (NO BRAND SPECIFIED) meter device kit Use to test blood sugar 2 times daily or as directed. 1 kit 0     blood glucose monitoring (NO BRAND SPECIFIED) test strip Use to test blood sugars one time daily or as directed 100 strip 11     glipiZIDE (GLUCOTROL) 5 MG tablet Take 1 tablet (5 mg) by mouth 2 times daily (before meals) Appointment needed for additional refills. 15 tablet 0     ibuprofen (ADVIL/MOTRIN) 600 MG tablet Take 1 tablet (600 mg) by mouth every 6 hours as needed for pain (mild) 30 tablet 0     metFORMIN (GLUCOPHAGE) 1000 MG tablet Take 1 tablet (1,000 mg) by mouth 2 times daily (with meals) 60 tablet 0     vitamin E 400 UNIT capsule Take 1 capsule by mouth daily       Allergies   Allergen Reactions     Aleve [Naproxen] Itching     BP Readings from Last 3 Encounters:   05/10/18 122/78   12/22/17 118/82   11/28/17 122/82    Wt Readings from Last 3 Encounters:   05/10/18 216 lb (98 kg)   12/22/17 225 lb (102.1 kg)   11/28/17 221 lb (100.2 kg)                    Reviewed and updated as needed this visit by clinical staff  Tobacco  Allergies  Meds  Soc Hx     "  Reviewed and updated as needed this visit by Provider         ROS:  Constitutional, HEENT, cardiovascular, pulmonary, gi and gu systems are negative, except as otherwise noted.    OBJECTIVE:     /78  Pulse 93  Temp 96  F (35.6  C) (Tympanic)  Resp 20  Ht 5' 8.7\" (1.745 m)  Wt 216 lb (98 kg)  SpO2 98%  BMI 32.18 kg/m2  Body mass index is 32.18 kg/(m^2).  GENERAL: healthy, alert and no distress  NECK: no adenopathy, no asymmetry, masses, or scars and thyroid normal to palpation  RESP: lungs clear to auscultation - no rales, rhonchi or wheezes  CV: regular rate and rhythm, normal S1 S2, no S3 or S4, no murmur, click or rub, no peripheral edema and peripheral pulses strong  MS: no gross musculoskeletal defects noted, no edema    Diagnostic Test Results:  Office Visit on 05/10/2018   Component Date Value Ref Range Status     Hemoglobin A1C 05/10/2018 7.2* 0 - 5.6 % Final    Comment: Normal <5.7% Prediabetes 5.7-6.4%  Diabetes 6.5% or higher - adopted from ADA   consensus guidelines.               ASSESSMENT/PLAN:     Diabetes Type II, A1c Controlled, non-insulin dependent   Associated with the following complications    Renal Complications:  None    Ophthalmologic Complications: None    Neurologic Complications: None    Peripheral Vascular Complications:  None    Other: None   Plan:  No changes in the patient's current treatment plan    - metFORMIN (GLUCOPHAGE) 1000 MG tablet; Take 1 tablet (1,000 mg) by mouth 2 times daily (with meals)  Dispense: 180 tablet; Refill: 1  - glipiZIDE (GLUCOTROL) 5 MG tablet; Take 1 tablet (5 mg) by mouth 2 times daily (before meals)  Dispense: 180 tablet; Refill: 1  - Hemoglobin A1c    Hyperlipidemia;    Plan:  Discussed that he should be on a statin to due to his risk factors and last lipid panel.  Discussed risk of heart attack and strokes.  He declined to be started on a statin at this point.  Wants to wait and check fasting labs again and decide at that point.     "     The 10-year ASCVD risk score (Alexandramanas PARKS Jr, et al., 2013) is: 19%    Values used to calculate the score:      Age: 53 years      Sex: Male      Is Non- : No      Diabetic: Yes      Tobacco smoker: Yes      Systolic Blood Pressure: 122 mmHg      Is BP treated: No      HDL Cholesterol: 35 mg/dL      Total Cholesterol: 179 mg/dL    Tobacco Cessation:   reports that he has been smoking Cigarettes.  He has a 24.00 pack-year smoking history. He has never used smokeless tobacco.  Tobacco Cessation Action Plan: Pharmacotherapies : Nicotine patch     Encounter to establish care with new doctor     Obesity, Class I, BMI 30-34.9    Tobacco use disorder  Had side effects from Chantix in the past.   - nicotine (NICODERM CQ) 21 MG/24HR 24 hr patch; Place 1 patch onto the skin every 24 hours  Dispense: 30 patch; Refill: 1    FUTURE APPOINTMENTS:       - Follow-up visit in 6 months for recheck, fasting labs.   See Patient Instructions    [facility]

## 2018-05-10 NOTE — PATIENT INSTRUCTIONS
You will need fating labs again this fall.     You should consider going on a cholesterol lowering medication.      Use the Nicotine patches as prescribed.

## 2018-05-10 NOTE — MR AVS SNAPSHOT
"              After Visit Summary   5/10/2018    Tim Gaona    MRN: [id number]           Patient Information     Date Of Birth          1965        Visit Information        Provider Department      5/10/2018 10:00 AM Sandy Francois APRN CNP [facility]        Today's Diagnoses     Encounter to establish care with new doctor    -  1    Controlled type 2 diabetes mellitus without complication, without long-term current use of insulin (H)        Type 2 diabetes mellitus without complication, unspecified long term insulin use status (H)        Hyperlipidemia LDL goal <100        Obesity, Class I, BMI 30-34.9        Tobacco use disorder          Care Instructions    You will need fating labs again this fall.     You should consider going on a cholesterol lowering medication.      Use the Nicotine patches as prescribed.                     Follow-ups after your visit        Who to contact     If you have questions or need follow up information about today's clinic visit or your schedule please contact [facility] directly at [phone].  Normal or non-critical lab and imaging results will be communicated to you by Man[employer], letter or phone within 4 business days after the clinic has received the results. If you do not hear from us within 7 days, please contact the clinic through Managed [employer] or phone. If you have a critical or abnormal lab result, we will notify you by phone as soon as possible.  Submit refill requests through [employer] or call your pharmacy and they will forward the refill request to us. Please allow 3 business days for your refill to be completed.          Additional Information About Your Visit        Managed [employer] Information     [employer] lets you send messages to your doctor, view your test results, renew your prescriptions, schedule appointments and more. To sign up, go to [email or web address] . Click on \"Log in\" on the left side of the screen, which will take you to the " "Welcome page. Then click on \"Sign up Now\" on the right side of the page.     You will be asked to enter the access code listed below, as well as some personal information. Please follow the directions to create your username and password.     Your access code is: [email or web address]  Expires: 2018 10:34 AM     Your access code will  in 90 days. If you need help or a new code, please call your [place] clinic or [phone].        Care EveryWhere ID     This is your Care EveryWhere ID. This could be used by other organizations to access your [place] medical records  [id number]        Your Vitals Were     Pulse Temperature Respirations Height Pulse Oximetry BMI (Body Mass Index)    93 96  F (35.6  C) (Tympanic) 20 5' 8.7\" (1.745 m) 98% 32.18 kg/m2       Blood Pressure from Last 3 Encounters:   05/10/18 122/78   17 118/82   17 122/82    Weight from Last 3 Encounters:   05/10/18 216 lb (98 kg)   17 225 lb (102.1 kg)   17 221 lb (100.2 kg)              We Performed the Following     Hemoglobin A1c          Today's Medication Changes          These changes are accurate as of 5/10/18 10:34 AM.  If you have any questions, ask your nurse or doctor.               Start taking these medicines.        Dose/Directions    nicotine 21 MG/24HR 24 hr patch   Commonly known as:  NICODERM CQ   Used for:  Tobacco use disorder   Started by:  Sandy Francois APRN CNP        Dose:  1 patch   Place 1 patch onto the skin every 24 hours   Quantity:  30 patch   Refills:  1         These medicines have changed or have updated prescriptions.        Dose/Directions    glipiZIDE 5 MG tablet   Commonly known as:  GLUCOTROL   This may have changed:  additional instructions   Used for:  Type 2 diabetes mellitus without complication, unspecified long term insulin use status (H)   Changed by:  Sandy Francois APRN CNP        Dose:  5 mg   Take 1 tablet (5 mg) by mouth 2 times daily (before meals)   Quantity:  180 " tablet   Refills:  1            Where to get your medicines      These medications were sent to [facility] - [place], [place] - [address]  [address], [place] [address]     Phone:  [phone]     glipiZIDE 5 MG tablet    metFORMIN 1000 MG tablet    nicotine 21 MG/24HR 24 hr patch                Primary Care Provider Office Phone # Fax #    [facility] [phone]       [address] [facility] [address]        Equal Access to Services     LISA WEEMS : Hadii aad ku hadasho Soomaali, waaxda luqadaha, qaybta kaalmada adeegyada, waxay idiin hayaan adeeg kharash laabisai . So [facility] [phone].    ATENCIÓN: Si habla español, tiene a dias disposición servicios gratuitos de asistencia lingüística. [facility] [phone].    We comply with applicable federal civil rights laws and [place] laws. We do not discriminate on the basis of race, color, national origin, age, disability, sex, sexual orientation, or gender identity.            Thank you!     Thank you for choosing [facility]  for your care. Our goal is always to provide you with excellent care. Hearing back from our patients is one way we can continue to improve our services. Please take a few minutes to complete the written survey that you may receive in the mail after your visit with us. Thank you!             Your Updated Medication List - Protect others around you: Learn how to safely use, store and throw away your medicines at www.disposemymeds.org.          This list is accurate as of 5/10/18 10:34 AM.  Always use your most recent med list.                   Brand Name Dispense Instructions for use Diagnosis    ASPIRIN PO      Take 81 mg by mouth daily        blood glucose monitoring lancets     1 Box    Use to test blood sugar one time daily or as directed.    Type 2 diabetes mellitus without complication, without long-term current use of insulin (H)       blood glucose monitoring meter device kit    no brand  specified    1 kit    Use to test blood sugar 2 times daily or as directed.    Controlled type 2 diabetes mellitus without complication, without long-term current use of insulin (H)       blood glucose monitoring test strip    no brand specified    100 strip    Use to test blood sugars one time daily or as directed    Type 2 diabetes mellitus without complication, without long-term current use of insulin (H)       glipiZIDE 5 MG tablet    GLUCOTROL    180 tablet    Take 1 tablet (5 mg) by mouth 2 times daily (before meals)    Type 2 diabetes mellitus without complication, unspecified long term insulin use status (H)       ibuprofen 600 MG tablet    ADVIL/MOTRIN    30 tablet    Take 1 tablet (600 mg) by mouth every 6 hours as needed for pain (mild)    Acute cholecystitis       metFORMIN 1000 MG tablet    GLUCOPHAGE    180 tablet    Take 1 tablet (1,000 mg) by mouth 2 times daily (with meals)    Controlled type 2 diabetes mellitus without complication, without long-term current use of insulin (H)       nicotine 21 MG/24HR 24 hr patch    NICODERM CQ    30 patch    Place 1 patch onto the skin every 24 hours    Tobacco use disorder       VITAMIN B COMPLEX PO      Take 1 tablet by mouth daily        Vitamin C 500 MG Caps      Take 1 capsule by mouth daily        vitamin E 400 UNIT capsule      Take 1 capsule by mouth daily

## 2018-09-18 ENCOUNTER — OFFICE VISIT (OUTPATIENT)
Dept: FAMILY MEDICINE | Facility: OTHER | Age: 53
End: 2018-09-18
Payer: COMMERCIAL

## 2018-09-18 VITALS
RESPIRATION RATE: 16 BRPM | DIASTOLIC BLOOD PRESSURE: 78 MMHG | TEMPERATURE: 96.2 F | HEART RATE: 80 BPM | OXYGEN SATURATION: 98 % | WEIGHT: 217.5 LBS | SYSTOLIC BLOOD PRESSURE: 124 MMHG | BODY MASS INDEX: 32.4 KG/M2

## 2018-09-18 DIAGNOSIS — R07.0 THROAT PAIN: Primary | ICD-10-CM

## 2018-09-18 DIAGNOSIS — E78.5 HYPERLIPIDEMIA LDL GOAL <100: ICD-10-CM

## 2018-09-18 LAB
DEPRECATED S PYO AG THROAT QL EIA: NORMAL
SPECIMEN SOURCE: NORMAL

## 2018-09-18 PROCEDURE — 99213 OFFICE O/P EST LOW 20 MIN: CPT | Performed by: PHYSICIAN ASSISTANT

## 2018-09-18 PROCEDURE — 87081 CULTURE SCREEN ONLY: CPT | Performed by: PHYSICIAN ASSISTANT

## 2018-09-18 PROCEDURE — 87880 STREP A ASSAY W/OPTIC: CPT | Performed by: PHYSICIAN ASSISTANT

## 2018-09-18 RX ORDER — CLINDAMYCIN HCL 300 MG
300 CAPSULE ORAL 3 TIMES DAILY
Qty: 12 CAPSULE | Refills: 0 | Status: SHIPPED | OUTPATIENT
Start: 2018-09-18 | End: 2018-09-22

## 2018-09-18 ASSESSMENT — PAIN SCALES - GENERAL: PAINLEVEL: SEVERE PAIN (6)

## 2018-09-18 NOTE — NURSING NOTE
Health Maintenance Due   Topic Date Due     PNEUMOVAX 1X HI RISK PATIENT < 65 (NO IB MSG)  04/13/1967     HIV SCREEN (SYSTEM ASSIGNED)  04/13/1983     INFLUENZA VACCINE (1) 09/01/2018     FOOT EXAM Q1 YEAR  09/20/2018     EYE EXAM Q1 YEAR  09/20/2018     LIPID MONITORING Q1 YEAR  09/20/2018     MICROALBUMIN Q1 YEAR  09/20/2018     Lexus CRANDALL LPN

## 2018-09-18 NOTE — PROGRESS NOTES
SUBJECTIVE:   Tim Gaona is a 53 year old male who presents to clinic today for the following health issues:      Acute Illness   Acute illness concerns: sore throat  Onset: 2 weeks    Fever: no    Chills/Sweats: YES    Headache (location?): YES    Sinus Pressure:YES    Conjunctivitis:  no    Ear Pain: YES: left    Rhinorrhea: YES    Congestion: YES    Sore Throat: YES     Cough: YES-productive     Wheeze: no    Decreased Appetite: no    Nausea: no    Vomiting: no    Diarrhea:  no    Dysuria/Freq.: no    Fatigue/Achiness: YES    Sick/Strep Exposure: YES     Therapies Tried and outcome: cough drop, slight relief     Patient is a 53 year old male who presents with complain of sore throat. He said that this has been ongoing for 2 weeks and that several members of his household were recently found to be positive for strep. In addition, he recently underwent dental surgery having 7 teeth removed. The dentist gave him 4 days of amoxicillin for use following the surgery in order to reduce risk of infection. Associated symptoms see above. Patient does have history of diabetes.     Problem list and histories reviewed & adjusted, as indicated.  Additional history: as documented    Patient Active Problem List   Diagnosis     Hyperlipidemia LDL goal <100     Obesity, Class I, BMI 30-34.9     Tobacco use disorder     Type 2 diabetes mellitus with hyperglycemia, without long-term current use of insulin (H)     Past Surgical History:   Procedure Laterality Date     HC REMOVE TONSILS/ADENOIDS,<11 Y/O  1971    T & A <12y.o.     LAPAROSCOPIC CHOLECYSTECTOMY N/A 11/19/2017    Procedure: LAPAROSCOPIC CHOLECYSTECTOMY;  Laparoscopic Cholecystectomy;  Surgeon: Farhana Cedillo MD;  Location:  OR       Social History   Substance Use Topics     Smoking status: Current Every Day Smoker     Packs/day: 0.75     Years: 32.00     Types: Cigarettes     Smokeless tobacco: Never Used      Comment: started age 20     Alcohol use No     Family  History   Problem Relation Age of Onset     Thyroid Disease Mother      Gallbladder Disease Mother      HEART DISEASE Father      Lipids Father      Thyroid Disease Paternal Grandmother      Gallbladder Disease Paternal Grandmother      Thyroid Disease Paternal Grandfather      Gallbladder Disease Sister          Current Outpatient Prescriptions   Medication Sig Dispense Refill     ACE/ARB/ARNI NOT PRESCRIBED, INTENTIONAL, Please choose reason not prescribed, below       Ascorbic Acid (VITAMIN C) 500 MG CAPS Take 1 capsule by mouth daily       ASPIRIN PO Take 81 mg by mouth daily       B Complex Vitamins (VITAMIN B COMPLEX PO) Take 1 tablet by mouth daily       blood glucose monitoring (ACCU-CHEK MULTICLIX) lancets Use to test blood sugar one time daily or as directed. 1 Box 11     blood glucose monitoring (NO BRAND SPECIFIED) meter device kit Use to test blood sugar 2 times daily or as directed. 1 kit 0     blood glucose monitoring (NO BRAND SPECIFIED) test strip Use to test blood sugars one time daily or as directed 100 strip 11     clindamycin (CLEOCIN) 300 MG capsule Take 1 capsule (300 mg) by mouth 3 times daily for 4 days 12 capsule 0     glipiZIDE (GLUCOTROL) 5 MG tablet Take 1 tablet (5 mg) by mouth 2 times daily (before meals) 180 tablet 1     ibuprofen (ADVIL/MOTRIN) 600 MG tablet Take 1 tablet (600 mg) by mouth every 6 hours as needed for pain (mild) 30 tablet 0     metFORMIN (GLUCOPHAGE) 1000 MG tablet Take 1 tablet (1,000 mg) by mouth 2 times daily (with meals) 180 tablet 1     nicotine (NICODERM CQ) 21 MG/24HR 24 hr patch Place 1 patch onto the skin every 24 hours 30 patch 1     vitamin E 400 UNIT capsule Take 1 capsule by mouth daily       Allergies   Allergen Reactions     Aleve [Naproxen] Itching       Reviewed and updated as needed this visit by clinical staff  Tobacco  Allergies  Meds  Med Hx  Surg Hx  Fam Hx  Soc Hx      Reviewed and updated as needed this visit by Provider          ROS:  Constitutional, HEENT, cardiovascular, pulmonary, gi and gu systems are negative, except as otherwise noted.    OBJECTIVE:     /78 (BP Location: Left arm, Patient Position: Chair, Cuff Size: Adult Large)  Pulse 80  Temp 96.2  F (35.7  C) (Oral)  Resp 16  Wt 217 lb 8 oz (98.7 kg)  SpO2 98%  BMI 32.4 kg/m2  Body mass index is 32.4 kg/(m^2).  GENERAL: healthy, alert and no distress  HENT: ear canals and TM's normal, nose and mouth without ulcers or lesions  NECK: no adenopathy, no asymmetry, masses, or scars and thyroid normal to palpation  RESP: lungs clear to auscultation - no rales, rhonchi or wheezes  CV: regular rate and rhythm, normal S1 S2, no S3 or S4, no murmur, click or rub, no peripheral edema and peripheral pulses strong  NEURO: Normal strength and tone, mentation intact and speech normal  PSYCH: mentation appears normal, affect normal/bright    Diagnostic Test Results:  Results for orders placed or performed in visit on 09/18/18 (from the past 24 hour(s))   Strep, Rapid Screen   Result Value Ref Range    Specimen Description Throat     Rapid Strep A Screen       NEGATIVE: No Group A streptococcal antigen detected by immunoassay, await culture report.       ASSESSMENT/PLAN:   1. Throat pain  Initial results from strep screen were negative. Discussed with patient empiric treatment given his recent surgery and increased risk for infection via diabetes. Patient was in agreement with this. Educational information on clindamycin sent home with patient.   - Strep, Rapid Screen  - Beta strep group A culture  - clindamycin (CLEOCIN) 300 MG capsule; Take 1 capsule (300 mg) by mouth 3 times daily for 4 days  Dispense: 12 capsule; Refill: 0    2. Hyperlipidemia LDL goal <100  Patient says that he does not wish to take ace/arb. BP is 124/78 today. We will continue to monitor at future appointments.  - ACE/ARB/ARNI NOT PRESCRIBED, INTENTIONAL,; Please choose reason not prescribed, below    Follow  up with clinic as needed or sooner if conditions change, worsen or fail to improve as expected.      Jozef Patricia PA-C  [facility]

## 2018-09-18 NOTE — MR AVS SNAPSHOT
After Visit Summary   9/18/2018    Tim Gaona    MRN: [id number]           Patient Information     Date Of Birth          1965        Visit Information        Provider Department      9/18/2018 10:20 AM Jozef Patricia PA-C [facility]        Today's Diagnoses     Throat pain    -  1    Hyperlipidemia LDL goal <100          Care Instructions      Clindamycin capsules  Brand Name: Cleocin  What is this medicine?  CLINDAMYCIN (KLIN da HALEY sin) is a lincosamide antibiotic. It is used to treat certain kinds of bacterial infections. It will not work for colds, flu, or other viral infections.  How should I use this medicine?  Take this medicine by mouth with a full glass of water. Follow the directions on the prescription label. You can take this medicine with food or on an empty stomach. If the medicine upsets your stomach, take it with food. Take your medicine at regular intervals. Do not take your medicine more often than directed. Take all of your medicine as directed even if you think your are better. Do not skip doses or stop your medicine early.  Talk to your pediatrician regarding the use of this medicine in children. Special care may be needed.  What side effects may I notice from receiving this medicine?  Side effects that you should report to your doctor or health care professional as soon as possible:    allergic reactions like skin rash, itching or hives, swelling of the face, lips, or tongue    dark urine    pain on swallowing    redness, blistering, peeling or loosening of the skin, including inside the mouth    unusual bleeding or bruising    unusually weak or tired    yellowing of eyes or skin  Side effects that usually do not require medical attention (report to your doctor or health care professional if they continue or are bothersome):    diarrhea    itching in the rectal or genital area    joint pain    nausea, vomiting    stomach pain  What may interact with this  medicine?      birth control pills    erythromycin    medicines that relax muscles for surgery    rifampin  What if I miss a dose?  If you miss a dose, take it as soon as you can. If it is almost time for your next dose, take only that dose. Do not take double or extra doses.  Where should I keep my medicine?  Keep out of the reach of children.  Store at room temperature between 20 and 25 degrees C (68 and 77 degrees F). Throw away any unused medicine after the expiration date.  What should I tell my health care provider before I take this medicine?  They need to know if you have any of these conditions:    kidney disease    liver disease    stomach problems like colitis    an unusual or allergic reaction to clindamycin, lincomycin, or other medicines, foods, dyes like tartrazine or preservatives    pregnant or trying to get pregnant    breast-feeding  What should I watch for while using this medicine?  Tell your doctor or healthcare professional if your symptoms do not start to get better or if they get worse.  Do not treat diarrhea with over the counter products. Contact your doctor if you have diarrhea that lasts more than 2 days or if it is severe and watery.  NOTE:This sheet is a summary. It may not cover all possible information. If you have questions about this medicine, talk to your doctor, pharmacist, or health care provider. Copyright  2018 [employer]                Follow-ups after your visit        Who to contact     If you have questions or need follow up information about today's clinic visit or your schedule please contact [facility] directly at [phone].  Normal or non-critical lab and imaging results will be communicated to you by MyChart, letter or phone within 4 business days after the clinic has received the results. If you do not hear from us within 7 days, please contact the clinic through MyChart or phone. If you have a critical or abnormal lab result, we will notify you by  phone as soon as possible.  Submit refill requests through [employer] or call your pharmacy and they will forward the refill request to us. Please allow 3 business days for your refill to be completed.          Additional Information About Your Visit        Care EveryWhere ID     This is your Care EveryWhere ID. This could be used by other organizations to access your [place] medical records  [id number]        Your Vitals Were     Pulse Temperature Respirations Pulse Oximetry BMI (Body Mass Index)       80 96.2  F (35.7  C) (Oral) 16 98% 32.4 kg/m2        Blood Pressure from Last 3 Encounters:   09/18/18 124/78   05/10/18 122/78   12/22/17 118/82    Weight from Last 3 Encounters:   09/18/18 217 lb 8 oz (98.7 kg)   05/10/18 216 lb (98 kg)   12/22/17 225 lb (102.1 kg)              We Performed the Following     Beta strep group A culture     Strep, Rapid Screen          Today's Medication Changes          These changes are accurate as of 9/18/18 10:55 AM.  If you have any questions, ask your nurse or doctor.               Start taking these medicines.        Dose/Directions    ACE/ARB/ARNI NOT PRESCRIBED (INTENTIONAL)   Used for:  Hyperlipidemia LDL goal <100   Started by:  Jozef Patricia PA-C        Please choose reason not prescribed, below   Refills:  0       clindamycin 300 MG capsule   Commonly known as:  CLEOCIN   Used for:  Throat pain   Started by:  Jozef Patricia PA-C        Dose:  300 mg   Take 1 capsule (300 mg) by mouth 3 times daily for 4 days   Quantity:  12 capsule   Refills:  0            Where to get your medicines      [facility] to [facility] - [address]  [address], [place] [address]     Phone:  [phone]     clindamycin 300 MG capsule         Some of these will need a paper prescription and others can be bought over the counter.  Ask your nurse if you have questions.     You don't need a prescription for these medications     ACE/ARB/ARNI NOT  PRESCRIBED (INTENTIONAL)                Primary Care Provider Office Phone # Fax #    YOLI Guerrero [phone]       [address] [facility] [address]        Equal Access to Services     LISA WEEMS : Hadii aad ku hadlisetteo Soomaali, waaxda luqadaha, qaybta kaalmada adeegyada, ezequiel recion jad sonamelvira sherman. So [facility] [phone].    ATENCIÓN: Si habla español, tiene a dias disposición servicios gratuitos de asistencia lingüística. Llame al [phone].    We comply with applicable federal civil rights laws and [place] laws. We do not discriminate on the basis of race, color, national origin, age, disability, sex, sexual orientation, or gender identity.            Thank you!     Thank you for choosing [facility]  for your care. Our goal is always to provide you with excellent care. Hearing back from our patients is one way we can continue to improve our services. Please take a few minutes to complete the written survey that you may receive in the mail after your visit with us. Thank you!             Your Updated Medication List - Protect others around you: Learn how to safely use, store and throw away your medicines at www.disposemymeds.org.          This list is accurate as of 9/18/18 10:55 AM.  Always use your most recent med list.                   Brand Name Dispense Instructions for use Diagnosis    ACE/ARB/ARNI NOT PRESCRIBED (INTENTIONAL)      Please choose reason not prescribed, below    Hyperlipidemia LDL goal <100       ASPIRIN PO      Take 81 mg by mouth daily        blood glucose monitoring lancets     1 Box    Use to test blood sugar one time daily or as directed.    Type 2 diabetes mellitus without complication, without long-term current use of insulin (H)       blood glucose monitoring meter device kit    no brand specified    1 kit    Use to test blood sugar 2 times daily or as directed.    Controlled type 2 diabetes mellitus without complication,  without long-term current use of insulin (H)       blood glucose monitoring test strip    no brand specified    100 strip    Use to test blood sugars one time daily or as directed    Type 2 diabetes mellitus without complication, without long-term current use of insulin (H)       clindamycin 300 MG capsule    CLEOCIN    12 capsule    Take 1 capsule (300 mg) by mouth 3 times daily for 4 days    Throat pain       glipiZIDE 5 MG tablet    GLUCOTROL    180 tablet    Take 1 tablet (5 mg) by mouth 2 times daily (before meals)    Type 2 diabetes mellitus with hyperglycemia, without long-term current use of insulin (H)       ibuprofen 600 MG tablet    ADVIL/MOTRIN    30 tablet    Take 1 tablet (600 mg) by mouth every 6 hours as needed for pain (mild)    Acute cholecystitis       metFORMIN 1000 MG tablet    GLUCOPHAGE    180 tablet    Take 1 tablet (1,000 mg) by mouth 2 times daily (with meals)    Type 2 diabetes mellitus with hyperglycemia, without long-term current use of insulin (H)       nicotine 21 MG/24HR 24 hr patch    NICODERM CQ    30 patch    Place 1 patch onto the skin every 24 hours    Tobacco use disorder       VITAMIN B COMPLEX PO      Take 1 tablet by mouth daily        Vitamin C 500 MG Caps      Take 1 capsule by mouth daily        vitamin E 400 UNIT capsule      Take 1 capsule by mouth daily

## 2018-09-18 NOTE — PATIENT INSTRUCTIONS
Clindamycin capsules  Brand Name: Cleocin  What is this medicine?  CLINDAMYCIN (IVORY Florentino) is a lincosamide antibiotic. It is used to treat certain kinds of bacterial infections. It will not work for colds, flu, or other viral infections.  How should I use this medicine?  Take this medicine by mouth with a full glass of water. Follow the directions on the prescription label. You can take this medicine with food or on an empty stomach. If the medicine upsets your stomach, take it with food. Take your medicine at regular intervals. Do not take your medicine more often than directed. Take all of your medicine as directed even if you think your are better. Do not skip doses or stop your medicine early.  Talk to your pediatrician regarding the use of this medicine in children. Special care may be needed.  What side effects may I notice from receiving this medicine?  Side effects that you should report to your doctor or health care professional as soon as possible:    allergic reactions like skin rash, itching or hives, swelling of the face, lips, or tongue    dark urine    pain on swallowing    redness, blistering, peeling or loosening of the skin, including inside the mouth    unusual bleeding or bruising    unusually weak or tired    yellowing of eyes or skin  Side effects that usually do not require medical attention (report to your doctor or health care professional if they continue or are bothersome):    diarrhea    itching in the rectal or genital area    joint pain    nausea, vomiting    stomach pain  What may interact with this medicine?      birth control pills    erythromycin    medicines that relax muscles for surgery    rifampin  What if I miss a dose?  If you miss a dose, take it as soon as you can. If it is almost time for your next dose, take only that dose. Do not take double or extra doses.  Where should I keep my medicine?  Keep out of the reach of children.  Store at room temperature between 20  and 25 degrees C (68 and 77 degrees F). Throw away any unused medicine after the expiration date.  What should I tell my health care provider before I take this medicine?  They need to know if you have any of these conditions:    kidney disease    liver disease    stomach problems like colitis    an unusual or allergic reaction to clindamycin, lincomycin, or other medicines, foods, dyes like tartrazine or preservatives    pregnant or trying to get pregnant    breast-feeding  What should I watch for while using this medicine?  Tell your doctor or healthcare professional if your symptoms do not start to get better or if they get worse.  Do not treat diarrhea with over the counter products. Contact your doctor if you have diarrhea that lasts more than 2 days or if it is severe and watery.  NOTE:This sheet is a summary. It may not cover all possible information. If you have questions about this medicine, talk to your doctor, pharmacist, or health care provider. Copyright  2018 Elsevier

## 2018-09-19 LAB
BACTERIA SPEC CULT: NORMAL
SPECIMEN SOURCE: NORMAL

## 2018-09-25 ENCOUNTER — TELEPHONE (OUTPATIENT)
Dept: FAMILY MEDICINE | Facility: OTHER | Age: 53
End: 2018-09-25

## 2018-09-25 DIAGNOSIS — J02.0 STREPTOCOCCAL SORE THROAT: Primary | ICD-10-CM

## 2018-09-25 NOTE — TELEPHONE ENCOUNTER
Please call patient to inform him that he strep culture returned negative, there was no evidence of infection. Additional antibiotics are not needed at this time. If his throat is sore I recommend warm liquids such as tea, cider, etc. He may also use tylenol or ibuprofen for the pain. If he develops a fever, notices drainage or white build up on the back of his throat he may call clinic and we can re-swab him.     Jozef Patricia PA-C on 9/25/2018 at 1:12 PM

## 2018-09-25 NOTE — TELEPHONE ENCOUNTER
Spoke with patient, information given, told patient he could make an appointment to have another throat culture done with the float nurse if he felt his symptoms were worsening, order for this was placed.  Also made an appointment for patient to be seen if he was still not feeling well on Monday.    Preeti Walters XRO/  [facility]

## 2018-09-25 NOTE — TELEPHONE ENCOUNTER
Reason for Call:  Medication or medication refill:    Do you use a [facility]?  Name of the pharmacy and phone number for the [facility]    Name of the medication requested: change of medication    Other request: medication prescribed gave him a major headache and sore throat.  He stopped it, was out of town until now and that's why he's just calling now  Would like to talk to Matt about trying something else.    Can we leave a detailed message on this number? YES    Phone number patient can be reached at: Home number on file [phone] (home)    Best Time:     Call taken on 9/25/2018 at 12:31 PM by Libra Rincon

## 2018-11-20 DIAGNOSIS — E11.65 TYPE 2 DIABETES MELLITUS WITH HYPERGLYCEMIA, WITHOUT LONG-TERM CURRENT USE OF INSULIN (H): ICD-10-CM

## 2018-11-20 NOTE — TELEPHONE ENCOUNTER
Reason for Call:  Medication or medication refill:    Do you use a [facility]?  Name of the pharmacy and phone number for the current request:  [facility] [phone]    Name of the medication requested: glipiZIDE (GLUCOTROL) 5 MG tablet , metFORMIN (GLUCOPHAGE) 1000 MG tablet    Other request: new script for this pharmacy, unable to fax over as they have no information.  Request for [facility]    Can we leave a detailed message on this number? Not Applicable    Phone number patient can be reached at: Home number on file [phone] (home)    Best Time: any time     Call taken on 11/20/2018 at 12:34 PM by Veronika Radford

## 2018-11-21 RX ORDER — GLIPIZIDE 5 MG/1
5 TABLET ORAL
Qty: 180 TABLET | Refills: 0 | Status: SHIPPED | OUTPATIENT
Start: 2018-11-21 | End: 2019-02-11

## 2018-11-21 NOTE — TELEPHONE ENCOUNTER
"Requested Prescriptions   Pending Prescriptions Disp Refills     glipiZIDE (GLUCOTROL) 5 MG tablet  Last Written Prescription Date:  5/10/18  Last Fill Quantity: 180,  # refills: 1   Last office visit: 9/18/2018 with prescribing provider:  Be[facility]   Future Office Visit:     180 tablet 1     Sig: Take 1 tablet (5 mg) by mouth 2 times daily (before meals)    Sulfonylurea Agents Failed    11/20/2018  1:16 PM       Failed - Patient has documented LDL within the past 12 mos.    Recent Labs   Lab Test  09/20/17   1102   LDL  116*            Failed - Patient has documented A1c within the specified period of time.    If HgbA1C is 8 or greater, it needs to be on file within the past 3 months.  If less than 8, must be on file within the past 6 months.     Recent Labs   Lab Test  05/10/18   1014   A1C  7.2*            Failed - Patient has a recent creatinine (normal) within the past 12 mos.    Recent Labs   Lab Test  11/18/17   2348   CR  0.84            Passed - Blood pressure less than 140/90 in past 6 months    BP Readings from Last 3 Encounters:   09/18/18 124/78   05/10/18 122/78   12/22/17 118/82                Passed - Patient has had a Microalbumin in the past 12 mos.    Recent Labs   Lab Test  09/20/17   1109   MICROL  <5   UMALCR  Unable to calculate due to low value            Passed - Patient is age 18 or older       Passed - Recent (6 mo) or future (30 days) visit within the authorizing provider's specialty    Patient had office visit in the last 6 months or has a visit in the next 30 days with authorizing provider or within the authorizing provider's specialty.  See \"Patient Info\" tab in inbasket, or \"Choose Columns\" in Meds & Orders section of the refill encounter.            metFORMIN (GLUCOPHAGE) 1000 MG tablet  Last Written Prescription Date:  5/10/18  Last Fill Quantity: 180,  # refills: 1   Last office visit: 9/18/2018 with prescribing provider:  Belem   Future Office Visit:     180 tablet 1     Sig: " "Take 1 tablet (1,000 mg) by mouth 2 times daily (with meals)    Biguanide Agents Failed    11/20/2018  1:16 PM       Failed - Patient has documented LDL within the past 12 mos.    Recent Labs   Lab Test  09/20/17   1102   LDL  116*            Failed - Patient has documented A1c within the specified period of time.    If HgbA1C is 8 or greater, it needs to be on file within the past 3 months.  If less than 8, must be on file within the past 6 months.     Recent Labs   Lab Test  05/10/18   1014   A1C  7.2*            Passed - Blood pressure less than 140/90 in past 6 months    BP Readings from Last 3 Encounters:   09/18/18 124/78   05/10/18 122/78   12/22/17 118/82                Passed - Patient has had a Microalbumin in the past 15 mos.    Recent Labs   Lab Test  09/20/17   1109   MICROL  <5   UMALCR  Unable to calculate due to low value            Passed - Patient is age 10 or older       Passed - Patient's CR is NOT>1.4 OR Patient's EGFR is NOT<45 within past 12 mos.    Recent Labs   Lab Test  11/18/17   2348   GFRESTIMATED  >90   GFRESTBLACK  >90       Recent Labs   Lab Test  11/18/17   2348   CR  0.84            Passed - Patient does NOT have a diagnosis of CHF.       Passed - Recent (6 mo) or future (30 days) visit within the authorizing provider's specialty    Patient had office visit in the last 6 months or has a visit in the next 30 days with authorizing provider or within the authorizing provider's specialty.  See \"Patient Info\" tab in inbasket, or \"Choose Columns\" in Meds & Orders section of the refill encounter.              "

## 2018-11-21 NOTE — TELEPHONE ENCOUNTER
Routing refill request to provider for review/approval because:  Labs out of range:  LDL  Labs not current:  LDL, A1C, Creatinine    JOSEY Coreas, RN  [facility]

## 2019-03-23 DIAGNOSIS — E11.65 TYPE 2 DIABETES MELLITUS WITH HYPERGLYCEMIA, WITHOUT LONG-TERM CURRENT USE OF INSULIN (H): ICD-10-CM

## 2019-03-23 DIAGNOSIS — E11.9 TYPE 2 DIABETES MELLITUS WITHOUT COMPLICATION, WITHOUT LONG-TERM CURRENT USE OF INSULIN (H): ICD-10-CM

## 2019-03-23 NOTE — TELEPHONE ENCOUNTER
"Clinic Action Needed:Yes, please call pt on Monday 3/25.  Reason for Call:\"I need a refill on my meds.metformin, glipizide and accu check strips. \"   Patient Recommendations/Teaching:Per epic patient is due for an appt and labs. He is aware of this and says he's going out of town again for a few weeks.   Routed to:will route request to PCP  Angelika Salas RN [facility]          "

## 2019-03-25 RX ORDER — GLIPIZIDE 5 MG/1
TABLET ORAL
Qty: 60 TABLET | Refills: 0 | Status: SHIPPED | OUTPATIENT
Start: 2019-03-25 | End: 2019-05-11

## 2019-04-15 ENCOUNTER — OFFICE VISIT (OUTPATIENT)
Dept: FAMILY MEDICINE | Facility: OTHER | Age: 54
End: 2019-04-15
Payer: COMMERCIAL

## 2019-04-15 VITALS
RESPIRATION RATE: 16 BRPM | BODY MASS INDEX: 33.05 KG/M2 | SYSTOLIC BLOOD PRESSURE: 122 MMHG | HEART RATE: 92 BPM | TEMPERATURE: 97.7 F | OXYGEN SATURATION: 98 % | HEIGHT: 69 IN | DIASTOLIC BLOOD PRESSURE: 78 MMHG | WEIGHT: 223.13 LBS

## 2019-04-15 DIAGNOSIS — E78.5 HYPERLIPIDEMIA LDL GOAL <100: ICD-10-CM

## 2019-04-15 DIAGNOSIS — F17.200 TOBACCO USE DISORDER: ICD-10-CM

## 2019-04-15 DIAGNOSIS — Z13.89 SCREENING FOR DIABETIC PERIPHERAL NEUROPATHY: ICD-10-CM

## 2019-04-15 DIAGNOSIS — Z23 NEED FOR VACCINATION: ICD-10-CM

## 2019-04-15 DIAGNOSIS — E11.65 TYPE 2 DIABETES MELLITUS WITH HYPERGLYCEMIA, WITHOUT LONG-TERM CURRENT USE OF INSULIN (H): Primary | ICD-10-CM

## 2019-04-15 LAB
CHOLEST SERPL-MCNC: 165 MG/DL
CREAT SERPL-MCNC: 0.72 MG/DL (ref 0.66–1.25)
CREAT UR-MCNC: 112 MG/DL
GFR SERPL CREATININE-BSD FRML MDRD: >90 ML/MIN/{1.73_M2}
HBA1C MFR BLD: 9 % (ref 0–5.6)
HDLC SERPL-MCNC: 27 MG/DL
LDLC SERPL CALC-MCNC: 115 MG/DL
MICROALBUMIN UR-MCNC: 8 MG/L
MICROALBUMIN/CREAT UR: 7.42 MG/G CR (ref 0–17)
NONHDLC SERPL-MCNC: 138 MG/DL
TRIGL SERPL-MCNC: 114 MG/DL

## 2019-04-15 PROCEDURE — 90714 TD VACC NO PRESV 7 YRS+ IM: CPT | Performed by: NURSE PRACTITIONER

## 2019-04-15 PROCEDURE — 90471 IMMUNIZATION ADMIN: CPT | Performed by: NURSE PRACTITIONER

## 2019-04-15 PROCEDURE — 80061 LIPID PANEL: CPT | Performed by: NURSE PRACTITIONER

## 2019-04-15 PROCEDURE — 99214 OFFICE O/P EST MOD 30 MIN: CPT | Mod: 25 | Performed by: NURSE PRACTITIONER

## 2019-04-15 PROCEDURE — 36415 COLL VENOUS BLD VENIPUNCTURE: CPT | Performed by: NURSE PRACTITIONER

## 2019-04-15 PROCEDURE — 83036 HEMOGLOBIN GLYCOSYLATED A1C: CPT | Performed by: NURSE PRACTITIONER

## 2019-04-15 PROCEDURE — 82565 ASSAY OF CREATININE: CPT | Performed by: NURSE PRACTITIONER

## 2019-04-15 PROCEDURE — 82043 UR ALBUMIN QUANTITATIVE: CPT | Performed by: NURSE PRACTITIONER

## 2019-04-15 ASSESSMENT — MIFFLIN-ST. JEOR: SCORE: 1838.5

## 2019-04-15 ASSESSMENT — PAIN SCALES - GENERAL: PAINLEVEL: NO PAIN (0)

## 2019-04-15 NOTE — PROGRESS NOTES
SUBJECTIVE:   Tim Gaona is a 54 year old male who presents to clinic today for the following   health issues:      Diabetes Follow-up    Patient is checking blood sugars: twice daily.    Blood sugar testing frequency justification: Patient modifying lifestyle changes (diet, exercise) with blood sugars. He hasnt been doing well on this lately  Results are as follows:         am - 200              postprandial after supper- 130-140    Diabetic concerns: None and other - he just needs to get his diet back under control     Symptoms of hypoglycemia (low blood sugar): none     Paresthesias (numbness or burning in feet) or sores: No     Date of last diabetic eye exam: he is due    BP Readings from Last 2 Encounters:   04/15/19 122/78   09/18/18 124/78     Hemoglobin A1C (%)   Date Value   05/10/2018 7.2 (H)   09/20/2017 6.5 (H)     LDL Cholesterol Calculated (mg/dL)   Date Value   09/20/2017 116 (H)   03/30/2009 145 (H)     LDL Cholesterol Direct (mg/dL)   Date Value   05/18/2016 122 (H)   08/01/2014 126       Diabetes Management Resources    Amount of exercise or physical activity: 4-5 days/week for an average of 45-60 minutes    Problems taking medications regularly: No    Medication side effects: none    Diet: regular (no restrictions)            Additional history: as documented    Reviewed  and updated as needed this visit by clinical staff  Tobacco  Allergies  Meds  Soc Hx        Reviewed and updated as needed this visit by Provider         Patient Active Problem List   Diagnosis     Hyperlipidemia LDL goal <100     Obesity, Class I, BMI 30-34.9     Tobacco use disorder     Type 2 diabetes mellitus with hyperglycemia, without long-term current use of insulin (H)     Past Surgical History:   Procedure Laterality Date     HC REMOVE TONSILS/ADENOIDS,<13 Y/O  1971    T & A <12y.o.     LAPAROSCOPIC CHOLECYSTECTOMY N/A 11/19/2017    Procedure: LAPAROSCOPIC CHOLECYSTECTOMY;  Laparoscopic Cholecystectomy;   Surgeon: Farhana Cedillo MD;  Location:  OR       Social History     Tobacco Use     Smoking status: Current Every Day Smoker     Packs/day: 0.75     Years: 32.00     Pack years: 24.00     Types: Cigarettes     Smokeless tobacco: Never Used     Tobacco comment: started age 20   Substance Use Topics     Alcohol use: No     Alcohol/week: 0.0 oz     Family History   Problem Relation Age of Onset     Thyroid Disease Mother      Gallbladder Disease Mother      Heart Disease Father      Lipids Father      Thyroid Disease Paternal Grandmother      Gallbladder Disease Paternal Grandmother      Thyroid Disease Paternal Grandfather      Gallbladder Disease Sister          Current Outpatient Medications   Medication Sig Dispense Refill     Ascorbic Acid (VITAMIN C) 500 MG CAPS Take 1 capsule by mouth daily       ASPIRIN PO Take 81 mg by mouth daily       B Complex Vitamins (VITAMIN B COMPLEX PO) Take 1 tablet by mouth daily       blood glucose (NO BRAND SPECIFIED) test strip Use to test blood sugars one time daily or as directed 100 strip 11     blood glucose monitoring (ACCU-CHEK MULTICLIX) lancets Use to test blood sugar one time daily or as directed. 1 Box 11     blood glucose monitoring (NO BRAND SPECIFIED) meter device kit Use to test blood sugar 2 times daily or as directed. 1 kit 0     glipiZIDE (GLUCOTROL) 5 MG tablet TAKE ONE TABLET BY MOUTH TWICE A DAY BEFORE MEALS 60 tablet 0     metFORMIN (GLUCOPHAGE) 1000 MG tablet TAKE ONE TABLET BY MOUTH TWICE A DAY 60 tablet 0     ACE/ARB/ARNI NOT PRESCRIBED, INTENTIONAL, Please choose reason not prescribed, below (Patient not taking: Reported on 4/15/2019)       ibuprofen (ADVIL/MOTRIN) 600 MG tablet Take 1 tablet (600 mg) by mouth every 6 hours as needed for pain (mild) (Patient not taking: Reported on 4/15/2019) 30 tablet 0     nicotine (NICODERM CQ) 21 MG/24HR 24 hr patch Place 1 patch onto the skin every 24 hours (Patient not taking: Reported on 4/15/2019) 30 patch 1  "    Allergies   Allergen Reactions     Aleve [Naproxen] Itching     BP Readings from Last 3 Encounters:   04/15/19 122/78   09/18/18 124/78   05/10/18 122/78    Wt Readings from Last 3 Encounters:   04/15/19 101.2 kg (223 lb 2 oz)   09/18/18 98.7 kg (217 lb 8 oz)   05/10/18 98 kg (216 lb)                    ROS:  Constitutional, HEENT, cardiovascular, pulmonary, GI, , musculoskeletal, neuro, skin, endocrine and psych systems are negative, except as otherwise noted.    OBJECTIVE:     /78   Pulse 92   Temp 97.7  F (36.5  C) (Tympanic)   Resp 16   Ht 1.746 m (5' 8.75\")   Wt 101.2 kg (223 lb 2 oz)   SpO2 98%   BMI 33.19 kg/m    Body mass index is 33.19 kg/m .  GENERAL: alert, no distress and obese  NECK: no adenopathy, no asymmetry, masses, or scars and thyroid normal to palpation  RESP: lungs clear to auscultation - no rales, rhonchi or wheezes  CV: regular rate and rhythm, normal S1 S2, no S3 or S4, no murmur, click or rub  MS: no gross musculoskeletal defects noted, no edema    Diagnostic Test Results:  Pending     ASSESSMENT/PLAN:         1. Type 2 diabetes mellitus with hyperglycemia, without long-term current use of insulin (H)  Chronic, controlled.  No change in treatment plan.   - CREATININE  - HEMOGLOBIN A1C  - Albumin Random Urine Quantitative with Creat Ratio    2. Screening for diabetic peripheral neuropathy    3. Hyperlipidemia LDL goal <100  He has refused statins in the past.  Will recheck lipids today.   - Lipid panel reflex to direct LDL Fasting    4. Tobacco use disorder  He declines any help with cessation.  Had side effects from Chantix in the past.       See Patient Instructions  Follow up based on lab results, either 3 or 6 months.     YOLI Guerrero [facility]"

## 2019-04-15 NOTE — NURSING NOTE
Prior to injection verified patient identity using patient's name and date of birth.   Patient instructed to remain in clinic for 20 minutes afterwards and to report any adverse reaction to me immediately.  Denise Steiner, [facility]

## 2019-04-15 NOTE — PATIENT INSTRUCTIONS
Check with the pharmacy about a shingles vaccine.     Labs will be done today.   For normal results, you will receive a letter with the results in about 2 weeks.  If anything is abnormal or unexpected, someone from the clinic will call you.      Get an eye exam done.     Stop smoking.  Let us know if we can help with this.  There are multiple things we can do to assist you.

## 2019-04-17 ENCOUNTER — TELEPHONE (OUTPATIENT)
Dept: FAMILY MEDICINE | Facility: OTHER | Age: 54
End: 2019-04-17

## 2019-04-17 DIAGNOSIS — F17.200 TOBACCO USE DISORDER: ICD-10-CM

## 2019-04-17 DIAGNOSIS — E11.65 TYPE 2 DIABETES MELLITUS WITH HYPERGLYCEMIA, WITHOUT LONG-TERM CURRENT USE OF INSULIN (H): Primary | ICD-10-CM

## 2019-04-17 DIAGNOSIS — E78.5 HYPERLIPIDEMIA LDL GOAL <100: ICD-10-CM

## 2019-04-17 NOTE — TELEPHONE ENCOUNTER
Spoke with patient and informed of message below. Patient understood and would not like to start medication at this time. Patient will try to eat better. He will call back later to schedule appointment with MTM.     Rosetta Haines MA

## 2019-04-17 NOTE — TELEPHONE ENCOUNTER
----- Message from YOLI Guerrero CNP sent at 4/17/2019  8:01 AM CDT -----  Please call patient.  His A1C came back at 9.0, which means his diabetes is not under good control.  He should see the [facility] pharmacist to discuss better ways to manage this. Referral has been placed for this.  His cholesterol was also too high, putting him at risk for heart attacks and strokes.  He should be on a medication to lower this.  If he is agreeable to this, I can send over a prescription for him.     Electronically signed by Sandy Francois CNP.

## 2019-05-11 DIAGNOSIS — E11.65 TYPE 2 DIABETES MELLITUS WITH HYPERGLYCEMIA, WITHOUT LONG-TERM CURRENT USE OF INSULIN (H): ICD-10-CM

## 2019-05-14 RX ORDER — GLIPIZIDE 5 MG/1
TABLET ORAL
Qty: 60 TABLET | Refills: 3 | Status: SHIPPED | OUTPATIENT
Start: 2019-05-14 | End: 2019-08-28

## 2019-05-14 NOTE — TELEPHONE ENCOUNTER
"Requested Prescriptions   Pending Prescriptions Disp Refills     glipiZIDE (GLUCOTROL) 5 MG tablet [Pharmacy Med Name: GLIPIZIDE 5 MG TABS 5 TAB] 60 tablet 3     Sig: TAKE ONE TABLET BY MOUTH TWICE A DAY BEFORE MEALS   Last Written Prescription Date:  3/25/19  Last Fill Quantity: 60,  # refills: 0   Last office visit: 4/15/2019 with prescribing provider:     Future Office Visit:   Next 5 appointments (look out 90 days)    May 20, 2019 10:00 AM CDT  Office Visit with Minerva Prescott [facility] [address] [facility] [id number] [phone]             Sulfonylurea Agents Passed - 5/11/2019 12:35 PM        Passed - Blood pressure less than 140/90 in past 6 months     BP Readings from Last 3 Encounters:   04/15/19 122/78   09/18/18 124/78   05/10/18 122/78                 Passed - Patient has documented LDL within the past 12 mos.     Recent Labs   Lab Test 04/15/19  1029   *             Passed - Patient has had a Microalbumin in the past 15 mos.     Recent Labs   Lab Test 04/15/19  1035   MICROL 8   UMALCR 7.42             Passed - Patient has documented A1c within the specified period of time.     If HgbA1C is 8 or greater, it needs to be on file within the past 3 months.  If less than 8, must be on file within the past 6 months.     Recent Labs   Lab Test 04/15/19  1029   A1C 9.0*             Passed - Medication is active on med list        Passed - Patient is age 18 or older        Passed - Patient has a recent creatinine (normal) within the past 12 mos.     Recent Labs   Lab Test 04/15/19  1029   CR 0.72             Passed - Recent (6 mo) or future (30 days) visit within the authorizing provider's specialty     Patient had office visit in the last 6 months or has a visit in the next 30 days with authorizing provider or within the authorizing provider's specialty.  See \"Patient Info\" tab in inbasket, or \"Choose Columns\" in Meds & Orders " section of the refill encounter.            Patient has been seen in the past 30 days.  Routing to PCP to advise.    MERT SantoN, RN

## 2019-05-18 DIAGNOSIS — E11.65 TYPE 2 DIABETES MELLITUS WITH HYPERGLYCEMIA, WITHOUT LONG-TERM CURRENT USE OF INSULIN (H): ICD-10-CM

## 2019-05-20 ENCOUNTER — TELEPHONE (OUTPATIENT)
Dept: FAMILY MEDICINE | Facility: OTHER | Age: 54
End: 2019-05-20

## 2019-05-20 ENCOUNTER — OFFICE VISIT (OUTPATIENT)
Dept: PHARMACY | Facility: OTHER | Age: 54
End: 2019-05-20
Attending: NURSE PRACTITIONER
Payer: COMMERCIAL

## 2019-05-20 VITALS
HEART RATE: 85 BPM | DIASTOLIC BLOOD PRESSURE: 78 MMHG | BODY MASS INDEX: 32.38 KG/M2 | SYSTOLIC BLOOD PRESSURE: 114 MMHG | WEIGHT: 217.7 LBS

## 2019-05-20 DIAGNOSIS — E11.65 TYPE 2 DIABETES MELLITUS WITH HYPERGLYCEMIA, WITHOUT LONG-TERM CURRENT USE OF INSULIN (H): Primary | ICD-10-CM

## 2019-05-20 DIAGNOSIS — Z78.9 TAKES DIETARY SUPPLEMENTS: ICD-10-CM

## 2019-05-20 DIAGNOSIS — R52 INTERMITTENT PAIN: ICD-10-CM

## 2019-05-20 DIAGNOSIS — F17.200 TOBACCO USE DISORDER: ICD-10-CM

## 2019-05-20 DIAGNOSIS — E78.5 HYPERLIPIDEMIA LDL GOAL <100: ICD-10-CM

## 2019-05-20 PROCEDURE — 99605 MTMS BY PHARM NP 15 MIN: CPT | Performed by: PHARMACIST

## 2019-05-20 PROCEDURE — 99607 MTMS BY PHARM ADDL 15 MIN: CPT | Performed by: PHARMACIST

## 2019-05-20 RX ORDER — IBUPROFEN 200 MG
400 TABLET ORAL EVERY 8 HOURS PRN
Status: ON HOLD | COMMUNITY
End: 2023-07-08

## 2019-05-20 RX ORDER — NICOTINE 21 MG/24HR
1 PATCH, TRANSDERMAL 24 HOURS TRANSDERMAL EVERY 24 HOURS
Qty: 30 PATCH | Refills: 1 | Status: SHIPPED | OUTPATIENT
Start: 2019-05-20 | End: 2019-10-30

## 2019-05-20 RX ORDER — FLASH GLUCOSE SCANNING READER
1 EACH MISCELLANEOUS ONCE
Qty: 1 DEVICE | Refills: 0 | Status: SHIPPED | OUTPATIENT
Start: 2019-05-20 | End: 2019-10-30

## 2019-05-20 RX ORDER — FLASH GLUCOSE SENSOR
1 KIT MISCELLANEOUS
Qty: 2 EACH | Refills: 1 | Status: SHIPPED | OUTPATIENT
Start: 2019-05-20 | End: 2019-10-30

## 2019-05-20 NOTE — TELEPHONE ENCOUNTER
PRIOR AUTHORIZATION DENIED    Medication: nicotine (NICODERM CQ) 21 MG/24HR 24 hr patch - denied    Denial Date: 5/20/2019    Denial Rational: WILL UPDATE ONCE DENIAL LETTER IS RECEIVED      Appeal Information: update after denial letter received

## 2019-05-20 NOTE — TELEPHONE ENCOUNTER
Requested Prescriptions   Pending Prescriptions Disp Refills     metFORMIN (GLUCOPHAGE) 1000 MG tablet [Pharmacy Med Name: METFORMIN HCL 1000 MG TABS 1000 TAB] 60 tablet 0     Sig: TAKE ONE TABLET BY MOUTH TWICE A DAY   Last Written Prescription Date:  3/25/19  Last Fill Quantity: 60,  # refills: 0   Last office visit: 4/15/2019 with prescribing provider:  4/15/19   Future Office Visit:   Next 5 appointments (look out 90 days)    Jul 10, 2019 10:00 AM CDT  SHORT with Minerva Prescott [facility] [address] [facility] [address] [phone]           Biguanide Agents Passed - 5/18/2019  9:20 AM        Passed - Blood pressure less than 140/90 in past 6 months     BP Readings from Last 3 Encounters:   05/20/19 114/78   04/15/19 122/78   09/18/18 124/78                 Passed - Patient has documented LDL within the past 12 mos.     Recent Labs   Lab Test 04/15/19  1029   *             Passed - Patient has had a Microalbumin in the past 15 mos.     Recent Labs   Lab Test 04/15/19  1035   MICROL 8   UMALCR 7.42             Passed - Patient is age 10 or older        Passed - Patient has documented A1c within the specified period of time.     If HgbA1C is 8 or greater, it needs to be on file within the past 3 months.  If less than 8, must be on file within the past 6 months.     Recent Labs   Lab Test 04/15/19  1029   A1C 9.0*             Passed - Patient's CR is NOT>1.4 OR Patient's EGFR is NOT<45 within past 12 mos.     Recent Labs   Lab Test 04/15/19  1029   GFRESTIMATED >90   GFRESTBLACK >90       Recent Labs   Lab Test 04/15/19  1029   CR 0.72             Passed - Patient does NOT have a diagnosis of CHF.        Passed - Medication is active on med list        Passed - Recent (6 mo) or future (30 days) visit within the authorizing provider's specialty     Patient had office visit in the last 6 months or has a visit in the next 30 days with  "authorizing provider or within the authorizing provider's specialty.  See \"Patient Info\" tab in inbasket, or \"Choose Columns\" in Meds & Orders section of the refill encounter.            "

## 2019-05-20 NOTE — PATIENT INSTRUCTIONS
Recommendations from today's MTM visit:                                                    MTM (medication therapy management) is a service provided by a clinical pharmacist designed to help you get the most of out of your medicines.   Today we reviewed what your medicines are for, how to know if they are working, that your medicines are safe and how to make your medicine regimen as easy as possible.     1. Send order for Freestyle Amada and Trulicity to [facility] pharmacy to check insurance cost.    2. Your blood sugar goals are  mg/dL fasting in the morning and less than 180 mg/dL 2 hours after meals.    3. Use nicotine patches daily to prevent cravings and nicotine inhaler as needed for breakthrough cravings. I will send prescriptions to [email or web address].    Next MTM visit: Wednesday July 10th at 10:00am    To schedule another MTM appointment, please call the clinic directly or you may call the MTM scheduling line at [phone] or toll-free at [phone].     My Clinical Pharmacist's contact information:                                                      It was a pleasure talking with you today!  Please feel free to contact me with any questions or concerns you have.      Minerva Prescott, PharmD  Medication Therapy Management Pharmacist  Pager: [phone]    You may receive a survey about the MTM services you received by email and/or US Mail.  I would appreciate your feedback to help me serve you better in the future. Your comments will be anonymous.

## 2019-05-20 NOTE — TELEPHONE ENCOUNTER
Routing refill request to provider for review/approval because:  Labs out of range:  LDL    MERT CoreasN, RN  [facility]

## 2019-05-20 NOTE — TELEPHONE ENCOUNTER
[facility] Prior Authorization Team Request    Medication: Trulicity 0.75mg/0.5ml  Dosing: Inject 0.75mg under the skin every 7 days  NDC (required for Medicaid members): [address]-36    Insurance   BIN: [id number]  PCN: [id number]  Grp: [id number]  ID: [id number]    CoverMyMeds Key (if applicable):     Additional documentation:     Filling Pharmacy: [facility]  Phone Number: [phone]  Contact: [facility] ([id number]) please send all responses to this pool.  Pharmacy NPI (required for Medicaid members): [id number]    Alyssa Mcgrath-Technician  [facility]  [phone]

## 2019-05-20 NOTE — PROGRESS NOTES
SUBJECTIVE/OBJECTIVE:                           Tim Gaona is a 54 year old male coming in for an initial visit for Medication Therapy Management.  He was referred to me from YOLI Andersen CNP.    Chief Complaint: Uncontrolled diabetes.    Allergies/ADRs: Reviewed in Epic  Tobacco: 0-1 pack per day - is interested in quittingTobacco Cessation Action Plan: Pharmacotherapies : Nicotine patch and other Nicotine replacement  Alcohol: not currently using  Caffeine: Half to full pot of coffee daily.   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  PMH: Reviewed in Epic    Medication Adherence/Access:  Patient uses pill box(es), wife helps set-up.  Patient takes medications 2 time(s) per day.   Per patient, misses medication 2 times per week over last few months due to being busy, but normally rarely.   The patient fills medications at [facility]: NO, fills medications at [facility].    Diabetes:  Pt currently taking glipizide 5mg twice daily and metformin 1000mg twice daily. Pt is not experiencing side effects.  SMBG: two times daily.  Ranges (patient reported): 115-140s, runs higher in the morning and lower at bedtime.  Patient is not experiencing hypoglycemia  Recent symptoms of high blood sugar? none  Eye exam: due  Foot exam: due  ACEi/ARB: Not taking.  Urine Albumin:   Lab Results   Component Value Date    UMALCR 7.42 04/15/2019   Aspirin: Taking 81mg daily and denies side effects  Diet/Exercise: Eats a lot of fruits and vegetables and chicken. Doesn't eat breakfast. Lunch is sandwich and salad. Dinner is meat and vegetables. Stays away from pasta/rice, tries to limit bread/potatos, was eating more sweets before but not anymore.     Lab Results   Component Value Date    A1C 9.0 04/15/2019    A1C 7.2 05/10/2018    A1C 6.5 09/20/2017    A1C 6.0 01/11/2017    A1C 9.7 05/18/2016     Hyperlipidemia: Current therapy includes nothing at this time. He has heard about  "statin-induced myalgias and this is why he has declined statin therapy in the past.  The 10-year ASCVD risk score (Odellmanas PARKS Jr., et al., 2013) is: 20.4%    Values used to calculate the score:      Age: 54 years      Sex: Male      Is Non- : No      Diabetic: Yes      Tobacco smoker: Yes      Systolic Blood Pressure: 114 mmHg      Is BP treated: No      HDL Cholesterol: 27 mg/dL      Total Cholesterol: 165 mg/dL    Smoking Cessation:   How much does he smoke: 0.75 to 1 ppd  How long has he been smokin years  What is the most he ever smoked:  1 to 1.5 ppd  Tried quitting in the past: Yes.  How many times:  2-3.  For how long: 3-4 days.  What worked/didn t work in the past: Chantix caused abdominal tingling/numbness even after retrying 3 times, patches are helpful, never tried lozenges or gum or inhaler.  Why did he relapse: \"I wanted to\"  Why does he smoke: Habit  Why does he want to quit (motivators for quitting): Health and financial reasons  How badly does he want to quit on a scale of 0 -10: 5   How successfully do you think you can stop smoking with our help on scale of 0-10: 5  Triggers for smoking include:  Endorses hand-to-mouth craving, smokes within half hour of waking up.     Intermittent Pain: Pt occasionally uses ibuprofen 400mg, maybe once every couple months for minor pain. Tylenol isn't effective for him. No concerns today.    Supplements: Pt is currently taking vitamin C daily and vitamin B complex daily, uses for general health. No concerns today.    Today's Vitals: /78   Pulse 85   Wt 217 lb 11.2 oz (98.7 kg)   BMI 32.38 kg/m      BP Readings from Last 3 Encounters:   04/15/19 122/78   18 124/78   05/10/18 122/78     Lab Results   Component Value Date    CHOL 165 04/15/2019     Lab Results   Component Value Date    HDL 27 04/15/2019     Lab Results   Component Value Date     04/15/2019     Lab Results   Component Value Date    TRIG 114 04/15/2019 "     Lab Results   Component Value Date    SHEYLA 7.0 03/30/2009     Creatinine   Date Value Ref Range Status   04/15/2019 0.72 0.66 - 1.25 mg/dL Final     GFR Estimate   Date Value Ref Range Status   04/15/2019 >90 >60 mL/min/[1.73_m2] Final     Comment:     Non  GFR Calc  Starting 12/18/2018, serum creatinine based estimated GFR (eGFR) will be   calculated using the Chronic Kidney Disease Epidemiology Collaboration   (CKD-EPI) equation.     Estimated Creatinine Clearance: 136.9 mL/min (based on SCr of 0.72 mg/dL).    ASSESSMENT:                             Current medications were reviewed today.     Medication Adherence: fair, no issues identified    Diabetes: Needs Improvement. Patient is not meeting A1c goal of < 7%. Self monitoring of blood glucose is not at goal of fasting  mg/dL, although limited data available. Pt may benefit from continuous glucose monitoring, offered Freestyle Amada and he is interested in trying it. I provided education regarding SMBG goals and advised pt to bring glucometer to next MTM visit. Pt would benefit from additional therapy, offered GLP-1 agonist or SGLT-2 inhibitor in order to lower blood sugars and promote weight loss, pt declines as he wants to work on lifestyle modifications at this time. I strongly recommended starting a medication today due to very high A1c and previous A1c above goal even when diet was better, pt will consider starting Trulicity in the future and is willing to let me send a prescription to his pharmacy in order to check on cost.     Hyperlipidemia: Needs Improvement. Pt is not on moderate intensity statin which is indicated based on 2013 ACC/AHA guidelines for lipid management.  Discussed risks vs benefits of statin therapy today using [employer] decision aid. Pt declines starting statin today.     Smoking Cessation: Needs improvement. Pt would benefit from smoking cessation and is ready to make a quit attempt today. Pt is  interested in trying nicotine patches for maintenance and nicotine inhaler as needed for breakthrough cravings. We reviewed the Quitting for Good with Treatment and Support booklet today.     Pain: Stable.     Supplements: Stable.     PLAN:                            1. Sent orders for Freestyle Amada and Trulicity to [facility] pharmacy to check on insurance cost.   2. Pt to use nicotine 21mg patches daily for smoking cessation maintenance along with nicotine inhaler as needed for breakthrough cravings. Sent orders to [facility] pharmacy.    **Update 5/20/19: Freestyle Amada reader is $14.30 and 2 sensors is $22.19. Trulicity needs a PA. Called to notify pt. He is agreeable to Freestyle Amada copays and will start using it to check blood sugars, doesn't feel like he needs any further teaching but will let me know if questions come up. Ok to continue working on Trulicity PA to see if we can get it covered at a reasonable cost, although he doesn't plan on starting it right now.    I spent 60 minutes with this patient today. All changes were made via collaborative practice agreement with YOLI Andersen CNP. A copy of the visit note was provided to the patient's primary care provider.    Will follow up in 1 month - scheduled appt on 7/10 due to pt preference.    The patient was given a summary of these recommendations as an after visit summary.     Minerva Prescott, PharmD  Medication Therapy Management Pharmacist  Pager: [phone]

## 2019-05-22 NOTE — TELEPHONE ENCOUNTER
Prior Authorization Approval    Authorization Effective Date: 5/20/2019  Authorization Expiration Date: 5/20/2020  Medication: Trulicity-APPROVED  Approved Dose/Quantity:   Reference #:     Insurance Company: [employer] ([facility]) - Phone [phone] Fax [phone]  Expected CoPay:       CoPay Card Available:      Foundation Assistance Needed:    Which Pharmacy is filling the prescription (Not needed for infusion/clinic administered): [facility] [place] [address] [place] [address]  Pharmacy Notified: Yes  Patient Notified: No    Pharmacy will notify patient when medication is ready.

## 2019-05-22 NOTE — TELEPHONE ENCOUNTER
Central Prior Authorization Team   Phone: [phone]      PA Initiation    Medication: Trulicity-Initiated  Insurance Company: [employer] ([facility]) - Phone [phone] Fax [phone]  Pharmacy Filling the Rx: [place], [place] - [address]   Filling Pharmacy Phone: [phone]  Filling Pharmacy Fax:    Start Date: 5/22/2019

## 2019-07-10 ENCOUNTER — TELEPHONE (OUTPATIENT)
Dept: PHARMACY | Facility: OTHER | Age: 54
End: 2019-07-10

## 2019-07-10 NOTE — TELEPHONE ENCOUNTER
Patient was no show for MTM appt today. Left voicemail and sent letter.    Minerva Prescott, PharmD  Medication Therapy Management Pharmacist  Pager: [phone]

## 2019-07-10 NOTE — LETTER
July 10, 2019      Tim Gaona  [address] [facility]  [address]   [facility] [address]        You missed your scheduled appointment to meet with a pharmacist for a Medication Therapy Management (MTM) visit today. We hope everything is ok. We know life gets busy sometimes.     I am available to you as a resource to help make sure you are getting the most out of your medications and help you manage your health the best you can. Your medications play such an important part in your overall health! We feel that when the pharmacist, doctor, and patient work together we can make sure everything is working for you and improve your quality of life.     I would love to meet with you and invite you to reschedule the appointment. Please call the clinic phone above to reschedule. Please feel free to call if you have any questions or concerns.       Kurt Velasco,        Minerva Prescott, PharmD  Medication Therapy Management Pharmacist, [facility]  Pager: [phone]

## 2019-08-28 DIAGNOSIS — E11.65 TYPE 2 DIABETES MELLITUS WITH HYPERGLYCEMIA, WITHOUT LONG-TERM CURRENT USE OF INSULIN (H): ICD-10-CM

## 2019-08-28 RX ORDER — GLIPIZIDE 5 MG/1
TABLET ORAL
Qty: 60 TABLET | Refills: 0 | Status: SHIPPED | OUTPATIENT
Start: 2019-08-28 | End: 2019-10-08

## 2019-08-28 NOTE — TELEPHONE ENCOUNTER
"Routing refill request to provider for review/approval because:  Labs not current:  A1C - labs needed every 3 months for results over 8  T'd up 1 month for provider review.      Requested Prescriptions   Pending Prescriptions Disp Refills     glipiZIDE (GLUCOTROL) 5 MG tablet [Pharmacy Med Name: GLIPIZIDE 5 MG TABS 5 TAB] 60 tablet 3     Sig: TAKE ONE TABLET BY MOUTH TWICE A DAY BEFORE MEALS   Last Written Prescription Date:  5/14/2019  Last Fill Quantity: 60,  # refills: 3   Last office visit: 4/15/2019 with prescribing provider:     Future Office Visit:        Sulfonylurea Agents Failed - 8/28/2019  2:43 PM        Failed - Patient has documented A1c within the specified period of time.     If HgbA1C is 8 or greater, it needs to be on file within the past 3 months.  If less than 8, must be on file within the past 6 months.     Recent Labs   Lab Test 04/15/19  1029   A1C 9.0*           Passed - Blood pressure less than 140/90 in past 6 months     BP Readings from Last 3 Encounters:   05/20/19 114/78   04/15/19 122/78   09/18/18 124/78           Passed - Patient has documented LDL within the past 12 mos.     Recent Labs   Lab Test 04/15/19  1029   *           Passed - Patient has had a Microalbumin in the past 15 mos.     Recent Labs   Lab Test 04/15/19  1035   MICROL 8   UMALCR 7.42           Passed - Medication is active on med list        Passed - Patient is age 18 or older        Passed - Patient has a recent creatinine (normal) within the past 12 mos.     Recent Labs   Lab Test 04/15/19  1029   CR 0.72           Passed - Recent (6 mo) or future (30 days) visit within the authorizing provider's specialty     Patient had office visit in the last 6 months or has a visit in the next 30 days with authorizing provider or within the authorizing provider's specialty.  See \"Patient Info\" tab in inbasket, or \"Choose Columns\" in Meds & Orders section of the refill encounter.          Maday Guzman RN    "

## 2019-10-30 ENCOUNTER — OFFICE VISIT (OUTPATIENT)
Dept: FAMILY MEDICINE | Facility: OTHER | Age: 54
End: 2019-10-30
Payer: COMMERCIAL

## 2019-10-30 VITALS
SYSTOLIC BLOOD PRESSURE: 122 MMHG | HEART RATE: 88 BPM | OXYGEN SATURATION: 97 % | TEMPERATURE: 96.8 F | RESPIRATION RATE: 20 BRPM | WEIGHT: 220 LBS | BODY MASS INDEX: 32.73 KG/M2 | DIASTOLIC BLOOD PRESSURE: 76 MMHG

## 2019-10-30 DIAGNOSIS — F17.200 TOBACCO USE DISORDER: ICD-10-CM

## 2019-10-30 DIAGNOSIS — E11.65 TYPE 2 DIABETES MELLITUS WITH HYPERGLYCEMIA, WITHOUT LONG-TERM CURRENT USE OF INSULIN (H): Primary | ICD-10-CM

## 2019-10-30 DIAGNOSIS — E78.5 HYPERLIPIDEMIA LDL GOAL <100: ICD-10-CM

## 2019-10-30 LAB
ANION GAP SERPL CALCULATED.3IONS-SCNC: 8 MMOL/L (ref 3–14)
BUN SERPL-MCNC: 13 MG/DL (ref 7–30)
CALCIUM SERPL-MCNC: 8.5 MG/DL (ref 8.5–10.1)
CHLORIDE SERPL-SCNC: 103 MMOL/L (ref 94–109)
CO2 SERPL-SCNC: 24 MMOL/L (ref 20–32)
CREAT SERPL-MCNC: 0.7 MG/DL (ref 0.66–1.25)
CREAT SERPL-MCNC: 0.72 MG/DL (ref 0.66–1.25)
CREAT UR-MCNC: 85 MG/DL
GFR SERPL CREATININE-BSD FRML MDRD: >90 ML/MIN/{1.73_M2}
GFR SERPL CREATININE-BSD FRML MDRD: >90 ML/MIN/{1.73_M2}
GLUCOSE SERPL-MCNC: 237 MG/DL (ref 70–99)
HBA1C MFR BLD: 7.8 % (ref 0–5.6)
MICROALBUMIN UR-MCNC: 9 MG/L
MICROALBUMIN/CREAT UR: 10.11 MG/G CR (ref 0–17)
POTASSIUM SERPL-SCNC: 4.1 MMOL/L (ref 3.4–5.3)
SODIUM SERPL-SCNC: 135 MMOL/L (ref 133–144)
TSH SERPL DL<=0.005 MIU/L-ACNC: 1.97 MU/L (ref 0.4–4)

## 2019-10-30 PROCEDURE — 84443 ASSAY THYROID STIM HORMONE: CPT | Performed by: NURSE PRACTITIONER

## 2019-10-30 PROCEDURE — 99214 OFFICE O/P EST MOD 30 MIN: CPT | Performed by: NURSE PRACTITIONER

## 2019-10-30 PROCEDURE — 83036 HEMOGLOBIN GLYCOSYLATED A1C: CPT | Performed by: NURSE PRACTITIONER

## 2019-10-30 PROCEDURE — 82565 ASSAY OF CREATININE: CPT | Performed by: NURSE PRACTITIONER

## 2019-10-30 PROCEDURE — 80048 BASIC METABOLIC PNL TOTAL CA: CPT | Performed by: NURSE PRACTITIONER

## 2019-10-30 PROCEDURE — 36415 COLL VENOUS BLD VENIPUNCTURE: CPT | Performed by: NURSE PRACTITIONER

## 2019-10-30 PROCEDURE — 82043 UR ALBUMIN QUANTITATIVE: CPT | Performed by: NURSE PRACTITIONER

## 2019-10-30 RX ORDER — GLIPIZIDE 5 MG/1
TABLET ORAL
Qty: 180 TABLET | Refills: 1 | Status: SHIPPED | OUTPATIENT
Start: 2019-10-30 | End: 2020-03-09

## 2019-10-30 RX ORDER — NICOTINE 21 MG/24HR
1 PATCH, TRANSDERMAL 24 HOURS TRANSDERMAL EVERY 24 HOURS
Qty: 30 PATCH | Refills: 1 | Status: SHIPPED | OUTPATIENT
Start: 2019-10-30 | End: 2021-03-19

## 2019-10-30 RX ORDER — FLASH GLUCOSE SENSOR
1 KIT MISCELLANEOUS
Qty: 2 EACH | Refills: 1 | Status: SHIPPED | OUTPATIENT
Start: 2019-10-30 | End: 2020-09-03

## 2019-10-30 ASSESSMENT — PAIN SCALES - GENERAL: PAINLEVEL: NO PAIN (0)

## 2019-10-30 NOTE — PROGRESS NOTES
Subjective     Tim Gaona is a 54 year old male who presents to clinic today for the following health issues:    HPI   Diabetes Follow-up    How often are you checking your blood sugar? 2-4  times daily  Blood sugar testing frequency justification:  Risk of hypoglycemia with medication(s)  What time of day are you checking your blood sugars (select all that apply)?  Before and after meals  Have you had any blood sugars above 200?  Yes   - typically in AM  Have you had any blood sugars below 70?  No    What symptoms do you notice when your blood sugar is low?      What concerns do you have today about your diabetes? None     Do you have any of these symptoms? (Select all that apply)  No numbness or tingling in feet.  No redness, sores or blisters on feet.  No complaints of excessive thirst.  No reports of blurry vision.  No significant changes to weight.     Have you had a diabetic eye exam in the last 12 months? No    BP Readings from Last 2 Encounters:   10/30/19 122/76   05/20/19 114/78     Hemoglobin A1C (%)   Date Value   04/15/2019 9.0 (H)   05/10/2018 7.2 (H)     LDL Cholesterol Calculated (mg/dL)   Date Value   04/15/2019 115 (H)   09/20/2017 116 (H)       Diabetes Management Resources      How many servings of fruits and vegetables do you eat daily?  3-4    On average, how many sweetened beverages do you drink each day (soda, juice, sweet tea, etc)?   0    Blood sugar testing frequency justification:  Risk of hypoglycemia with medication(s)      Review of Systems   ROS COMP: Constitutional, HEENT, cardiovascular, pulmonary, gi and gu systems are negative, except as otherwise noted.      Objective    /76   Pulse 88   Temp 96.8  F (36  C) (Temporal)   Resp 20   Wt 99.8 kg (220 lb)   SpO2 97%   BMI 32.73 kg/m    Body mass index is 32.73 kg/m .  Physical Exam   GENERAL: healthy, alert and no distress  NECK: no adenopathy, no asymmetry, masses, or scars and thyroid normal to palpation  RESP: lungs  clear to auscultation - no rales, rhonchi or wheezes  CV: regular rate and rhythm, normal S1 S2, no S3 or S4, no murmur, click or rub, no peripheral edema and peripheral pulses strong  ABDOMEN: soft, nontender, no hepatosplenomegaly, no masses and bowel sounds normal  MS: no gross musculoskeletal defects noted, no edema    Diagnostic Test Results:  Results for orders placed or performed in visit on 10/30/19 (from the past 24 hour(s))   Hemoglobin A1c   Result Value Ref Range    Hemoglobin A1C 7.8 (H) 0 - 5.6 %           Assessment & Plan     1. Type 2 diabetes mellitus with hyperglycemia, without long-term current use of insulin (H)  Chronic, controlled.  No change in treatment plan.  Will see if we can get him a continuous glucose meter as he is checking up to 4 times daily and there is risk of hypoglycemia with his medications.   - Hemoglobin A1c  - TSH with free T4 reflex  - Basic metabolic panel  (Ca, Cl, CO2, Creat, Gluc, K, Na, BUN)  - blood glucose monitoring (ACCU-CHEK MULTICLIX) lancets; Use to test blood sugar one time daily or as directed.  Dispense: 100 each; Refill: 11  - metFORMIN (GLUCOPHAGE) 1000 MG tablet; Take 1 tablet (1,000 mg) by mouth 2 times daily (with meals)  Dispense: 180 tablet; Refill: 1  - glipiZIDE (GLUCOTROL) 5 MG tablet; TAKE ONE TABLET BY MOUTH TWICE A DAY BEFORE MEALS  Dispense: 180 tablet; Refill: 1  - Continuous Blood Gluc Sensor (FREESTYLE CHAIM 14 DAY SENSOR) MISC; 1 Device every 14 days Apply sensor as directed every 14 days  Dispense: 2 each; Refill: 1  - Lipid panel reflex to direct LDL Fasting; Future    2. Tobacco use disorder  - nicotine (NICODERM CQ) 21 MG/24HR 24 hr patch; Place 1 patch onto the skin every 24 hours  Dispense: 30 patch; Refill: 1    3. Hyperlipidemia LDL goal <100  Discussed recommendation for statin therapy.  He refuses.    - Albumin Random Urine Quantitative with Creat Ratio  - Creatinine     Tobacco Cessation:   reports that he has been smoking  cigarettes. He has a 24.00 pack-year smoking history. He has never used smokeless tobacco.  Tobacco Cessation Action Plan: Pharmacotherapies : Nicotine patch    He refuses vaccinations today.       See Patient Instructions    Return in about 6 months (around 4/30/2020) for Physical, Diabetes Visit, Lab Work.    YOLI Guerrero [facility]

## 2019-12-02 ENCOUNTER — TELEPHONE (OUTPATIENT)
Dept: PHARMACY | Facility: OTHER | Age: 54
End: 2019-12-02

## 2019-12-02 NOTE — TELEPHONE ENCOUNTER
This patient is due for MTM follow-up. I called the patient to schedule an appointment and left a message with the clinic phone number for the patient to call to schedule.    Minerva Prescott, PharmD  Medication Therapy Management Pharmacist  Pager: [phone]

## 2020-01-15 ENCOUNTER — TELEPHONE (OUTPATIENT)
Dept: PHARMACY | Facility: OTHER | Age: 55
End: 2020-01-15

## 2020-01-15 NOTE — TELEPHONE ENCOUNTER
We have been unable to reach this patient for MTM follow-up after several attempts. We will stop reaching out to the patient at this time. Please let us know if we can assist in this patient's care in the future.    Routing to PCP as COLETTE Prescott, PharmD  Medication Therapy Management Pharmacist  Pager: [phone]

## 2020-03-09 DIAGNOSIS — E11.65 TYPE 2 DIABETES MELLITUS WITH HYPERGLYCEMIA, WITHOUT LONG-TERM CURRENT USE OF INSULIN (H): ICD-10-CM

## 2020-03-09 RX ORDER — GLIPIZIDE 5 MG/1
TABLET ORAL
Qty: 180 TABLET | Refills: 0 | Status: SHIPPED | OUTPATIENT
Start: 2020-03-09 | End: 2020-06-10

## 2020-03-09 NOTE — TELEPHONE ENCOUNTER
"  Requested Prescriptions   Pending Prescriptions Disp Refills     glipiZIDE (GLUCOTROL) 5 MG tablet 180 tablet 1     Sig: TAKE ONE TABLET BY MOUTH TWICE A DAY BEFORE MEALS   Last Written Prescription Date:  10/30/2019  Last Fill Quantity: 180,  # refills: 1   Last office visit: 10/30/2019 with prescribing provider:     Future Office Visit:        Sulfonylurea Agents Passed - 3/9/2020  9:34 AM        Passed - Blood pressure less than 140/90 in past 6 months     BP Readings from Last 3 Encounters:   10/30/19 122/76   05/20/19 114/78   04/15/19 122/78           Passed - Patient has documented LDL within the past 12 mos.     Recent Labs   Lab Test 04/15/19  1029   *           Passed - Patient has had a Microalbumin in the past 15 mos.     Recent Labs   Lab Test 10/30/19  1020   MICROL 9   UMALCR 10.11           Passed - Patient has documented A1c within the specified period of time.     If HgbA1C is 8 or greater, it needs to be on file within the past 3 months.  If less than 8, must be on file within the past 6 months.     Recent Labs   Lab Test 10/30/19  1007   A1C 7.8*           Passed - Medication is active on med list        Passed - Patient is age 18 or older        Passed - Patient has a recent creatinine (normal) within the past 12 mos.     Recent Labs   Lab Test 10/30/19  1007   CR 0.70  0.72           Passed - Recent (6 mo) or future (30 days) visit within the authorizing provider's specialty     Patient had office visit in the last 6 months or has a visit in the next 30 days with authorizing provider or within the authorizing provider's specialty.  See \"Patient Info\" tab in inbasket, or \"Choose Columns\" in Meds & Orders section of the refill encounter.          Prescription approved per [facility] Refill Protocol.  Maday Guzman RN        "

## 2020-04-30 ENCOUNTER — TELEPHONE (OUTPATIENT)
Dept: FAMILY MEDICINE | Facility: OTHER | Age: 55
End: 2020-04-30

## 2020-04-30 NOTE — TELEPHONE ENCOUNTER
----- Message from YOLI Guerrero CNP sent at 4/30/2020 12:32 PM CDT -----  Please set up a video/telephone visit for diabetes.     Electronically signed by Sandy Francois CNP.

## 2020-04-30 NOTE — TELEPHONE ENCOUNTER
LMTCB-Please assist patient with setting up an appointment. Mragie Morris LPN........4/30/2020 1:10 PM

## 2020-06-08 DIAGNOSIS — E11.65 TYPE 2 DIABETES MELLITUS WITH HYPERGLYCEMIA, WITHOUT LONG-TERM CURRENT USE OF INSULIN (H): ICD-10-CM

## 2020-06-10 NOTE — TELEPHONE ENCOUNTER
Routing to team to set up telephone appointment for patient for diabetic follow up.  Please also ask patient how much medication she has left and schedule appropriately.  If patient needs a refill before their appointment, please route to PCP for completion of refill.    MERT CoreasN, RN  [facility]

## 2020-06-12 RX ORDER — GLIPIZIDE 5 MG/1
TABLET ORAL
Qty: 60 TABLET | Refills: 0 | Status: SHIPPED | OUTPATIENT
Start: 2020-06-12 | End: 2020-08-12

## 2020-06-12 NOTE — TELEPHONE ENCOUNTER
No return call yet.     Will route to PCP to review.     Lillian Harper, MERTN, RN  [facility]

## 2020-08-04 DIAGNOSIS — E11.65 TYPE 2 DIABETES MELLITUS WITH HYPERGLYCEMIA, WITHOUT LONG-TERM CURRENT USE OF INSULIN (H): ICD-10-CM

## 2020-08-05 NOTE — TELEPHONE ENCOUNTER
Routing refill request to provider for review/approval because:  Jessa given x1 and patient did not follow up, please advise  Patient needs to be seen because:  Due for diabetic follow up    JOSEY Coreas, RN  [facility]

## 2020-08-10 DIAGNOSIS — E11.65 TYPE 2 DIABETES MELLITUS WITH HYPERGLYCEMIA, WITHOUT LONG-TERM CURRENT USE OF INSULIN (H): ICD-10-CM

## 2020-08-11 NOTE — TELEPHONE ENCOUNTER
Routing to schedulers to set up virtual appointment for patient for diabetes.    Routing refill request to provider for review/approval because:  Labs not current:  A1c  A break in medication  Has been over 6 months since last diabetes visit.    Last Written Prescription Date:  6/12/2020  Last Fill Quantity: 60,  # refills: 0   Last office visit: 10/30/2019 with prescribing provider:     Future Office Visit:      MERT SantoN, RN

## 2020-08-12 RX ORDER — GLIPIZIDE 5 MG/1
TABLET ORAL
Qty: 60 TABLET | Refills: 0 | Status: SHIPPED | OUTPATIENT
Start: 2020-08-12 | End: 2020-09-03

## 2020-08-27 ENCOUNTER — TELEPHONE (OUTPATIENT)
Dept: FAMILY MEDICINE | Facility: OTHER | Age: 55
End: 2020-08-27

## 2020-08-27 NOTE — TELEPHONE ENCOUNTER
Patient is scheduled for a virtual visit with me, but is due for labs, so this is not appropriate.  This needs to be rescheduled to an in person visit.     Electronically signed by Sandy Francois CNP.

## 2020-09-03 ENCOUNTER — HOSPITAL ENCOUNTER (OUTPATIENT)
Dept: GENERAL RADIOLOGY | Facility: CLINIC | Age: 55
End: 2020-09-03
Attending: NURSE PRACTITIONER
Payer: COMMERCIAL

## 2020-09-03 ENCOUNTER — OFFICE VISIT (OUTPATIENT)
Dept: FAMILY MEDICINE | Facility: CLINIC | Age: 55
End: 2020-09-03
Payer: COMMERCIAL

## 2020-09-03 VITALS
DIASTOLIC BLOOD PRESSURE: 83 MMHG | RESPIRATION RATE: 20 BRPM | BODY MASS INDEX: 32.43 KG/M2 | WEIGHT: 214 LBS | HEART RATE: 81 BPM | OXYGEN SATURATION: 98 % | TEMPERATURE: 98.3 F | HEIGHT: 68 IN | SYSTOLIC BLOOD PRESSURE: 127 MMHG

## 2020-09-03 DIAGNOSIS — M25.562 CHRONIC PAIN OF LEFT KNEE: ICD-10-CM

## 2020-09-03 DIAGNOSIS — F17.200 TOBACCO USE DISORDER: ICD-10-CM

## 2020-09-03 DIAGNOSIS — G89.29 CHRONIC LEFT SHOULDER PAIN: ICD-10-CM

## 2020-09-03 DIAGNOSIS — G89.29 CHRONIC PAIN OF LEFT KNEE: ICD-10-CM

## 2020-09-03 DIAGNOSIS — M25.512 CHRONIC LEFT SHOULDER PAIN: ICD-10-CM

## 2020-09-03 DIAGNOSIS — E11.65 TYPE 2 DIABETES MELLITUS WITH HYPERGLYCEMIA, WITHOUT LONG-TERM CURRENT USE OF INSULIN (H): Primary | ICD-10-CM

## 2020-09-03 LAB
ANION GAP SERPL CALCULATED.3IONS-SCNC: 6 MMOL/L (ref 3–14)
BUN SERPL-MCNC: 12 MG/DL (ref 7–30)
CALCIUM SERPL-MCNC: 9.3 MG/DL (ref 8.5–10.1)
CHLORIDE SERPL-SCNC: 105 MMOL/L (ref 94–109)
CO2 SERPL-SCNC: 25 MMOL/L (ref 20–32)
CREAT SERPL-MCNC: 0.72 MG/DL (ref 0.66–1.25)
CREAT UR-MCNC: 101 MG/DL
GFR SERPL CREATININE-BSD FRML MDRD: >90 ML/MIN/{1.73_M2}
GLUCOSE SERPL-MCNC: 190 MG/DL (ref 70–99)
HBA1C MFR BLD: 7.3 % (ref 0–5.6)
LDLC SERPL DIRECT ASSAY-MCNC: 127 MG/DL
MICROALBUMIN UR-MCNC: 19 MG/L
MICROALBUMIN/CREAT UR: 18.42 MG/G CR (ref 0–17)
POTASSIUM SERPL-SCNC: 4.2 MMOL/L (ref 3.4–5.3)
SODIUM SERPL-SCNC: 136 MMOL/L (ref 133–144)

## 2020-09-03 PROCEDURE — 80048 BASIC METABOLIC PNL TOTAL CA: CPT | Performed by: NURSE PRACTITIONER

## 2020-09-03 PROCEDURE — 73560 X-RAY EXAM OF KNEE 1 OR 2: CPT | Mod: TC

## 2020-09-03 PROCEDURE — 36415 COLL VENOUS BLD VENIPUNCTURE: CPT | Performed by: NURSE PRACTITIONER

## 2020-09-03 PROCEDURE — 82043 UR ALBUMIN QUANTITATIVE: CPT | Performed by: NURSE PRACTITIONER

## 2020-09-03 PROCEDURE — 99214 OFFICE O/P EST MOD 30 MIN: CPT | Performed by: NURSE PRACTITIONER

## 2020-09-03 PROCEDURE — 83721 ASSAY OF BLOOD LIPOPROTEIN: CPT | Performed by: NURSE PRACTITIONER

## 2020-09-03 PROCEDURE — 73030 X-RAY EXAM OF SHOULDER: CPT | Mod: TC

## 2020-09-03 PROCEDURE — 83036 HEMOGLOBIN GLYCOSYLATED A1C: CPT | Performed by: NURSE PRACTITIONER

## 2020-09-03 RX ORDER — FLASH GLUCOSE SENSOR
1 KIT MISCELLANEOUS
Qty: 2 EACH | Refills: 1 | Status: SHIPPED | OUTPATIENT
Start: 2020-09-03 | End: 2021-03-23

## 2020-09-03 RX ORDER — NICOTINE 21 MG/24HR
1 PATCH, TRANSDERMAL 24 HOURS TRANSDERMAL EVERY 24 HOURS
Qty: 30 PATCH | Refills: 1 | Status: SHIPPED | OUTPATIENT
Start: 2020-09-03 | End: 2023-07-14

## 2020-09-03 RX ORDER — FLASH GLUCOSE SENSOR
1 KIT MISCELLANEOUS
Qty: 2 EACH | Refills: 11 | Status: SHIPPED | OUTPATIENT
Start: 2020-09-03 | End: 2022-08-02

## 2020-09-03 RX ORDER — GLIPIZIDE 5 MG/1
5 TABLET ORAL
Qty: 180 TABLET | Refills: 1 | Status: SHIPPED | OUTPATIENT
Start: 2020-09-03 | End: 2021-03-17

## 2020-09-03 ASSESSMENT — MIFFLIN-ST. JEOR: SCORE: 1783.38

## 2020-09-03 ASSESSMENT — PAIN SCALES - GENERAL: PAINLEVEL: NO PAIN (0)

## 2020-09-03 NOTE — PROGRESS NOTES
Subjective     Tim Gaona is a 55 year old male who presents to clinic today for the following health issues:    HPI       Diabetes Follow-up    How often are you checking your blood sugar? Two times daily  Blood sugar testing frequency justification:  Risk of hypoglycemia with medication(s)  What time of day are you checking your blood sugars (select all that apply)?  Before and after meals  Have you had any blood sugars above 200?  No  Have you had any blood sugars below 70?  No    What symptoms do you notice when your blood sugar is low?  None    What concerns do you have today about your diabetes? None     Do you have any of these symptoms? (Select all that apply)  No numbness or tingling in feet.  No redness, sores or blisters on feet.  No complaints of excessive thirst.  No reports of blurry vision.  No significant changes to weight.    Have you had a diabetic eye exam in the last 12 months? No      Musculoskeletal problem/pain      Duration: 1 year    Description  Location: Left shoulder and left knee    Intensity:  moderate    Accompanying signs and symptoms: radiation of pain down left arm    History  Previous similar problem: no   Previous evaluation:  none    Precipitating or alleviating factors:  Trauma or overuse: YES-  - had a fall on the ice last winter, striking both his knee and shoulder.  Has had pain ever since then  Aggravating factors include: overuse    Therapies tried and outcome: rest/inactivity     Has a bump on his knee from the fall.  No instability.    Left shoulder has some radicular symptoms.   Was not seen at the time, no imaging done    BP Readings from Last 2 Encounters:   09/03/20 127/83   10/30/19 122/76     Hemoglobin A1C (%)   Date Value   10/30/2019 7.8 (H)   04/15/2019 9.0 (H)     LDL Cholesterol Calculated (mg/dL)   Date Value   04/15/2019 115 (H)   09/20/2017 116 (H)           How many servings of fruits and vegetables do you eat daily?  4 or more    On average, how many  "sweetened beverages do you drink each day (Examples: soda, juice, sweet tea, etc.  Do NOT count diet or artificially sweetened beverages)?   0    How many days per week do you exercise enough to make your heart beat faster? 3 or less    How many minutes a day do you exercise enough to make your heart beat faster? 20 - 29    How many days per week do you miss taking your medication? 0        Review of Systems   Constitutional, HEENT, cardiovascular, pulmonary, gi and gu systems are negative, except as otherwise noted.      Objective    /83   Pulse 81   Temp 98.3  F (36.8  C) (Temporal)   Resp 20   Ht 1.732 m (5' 8.2\")   Wt 97.1 kg (214 lb)   SpO2 98%   BMI 32.35 kg/m    Body mass index is 32.35 kg/m .  Physical Exam   GENERAL: alert, no distress and obese  NECK: no adenopathy, no asymmetry, masses, or scars and thyroid normal to palpation  RESP: lungs clear to auscultation - no rales, rhonchi or wheezes  CV: regular rate and rhythm, normal S1 S2, no S3 or S4, no murmur, click or rub, no peripheral edema and peripheral pulses strong  ABDOMEN: soft, nontender, no hepatosplenomegaly, no masses and bowel sounds normal  MS: Left shoulder: no deformity noted, has near full ROM, some pain with abduction.  No weakness.    Left knee has a firm mass just distal to the patella.  There is pain here.  Normal ROM, no swelling.  No instability    Xrays -     Left shoulder: negative, see report  Left knee: chronic ossification near the patellar tendon, otherwise negative, see report    Office Visit on 09/03/2020   Component Date Value Ref Range Status     Hemoglobin A1C 09/03/2020 7.3* 0 - 5.6 % Final    Comment: Normal <5.7% Prediabetes 5.7-6.4%  Diabetes 6.5% or higher - adopted from ADA   consensus guidelines.       LDL Cholesterol Direct 09/03/2020 127* <100 mg/dL Final    Comment: Above desirable:  100-129 mg/dl  Borderline High:  130-159 mg/dL  High:             160-189 mg/dL  Very high:       >189 mg/dl       " Sodium 09/03/2020 136  133 - 144 mmol/L Final     Potassium 09/03/2020 4.2  3.4 - 5.3 mmol/L Final     Chloride 09/03/2020 105  94 - 109 mmol/L Final     Carbon Dioxide 09/03/2020 25  20 - 32 mmol/L Final     Anion Gap 09/03/2020 6  3 - 14 mmol/L Final     Glucose 09/03/2020 190* 70 - 99 mg/dL Final     Urea Nitrogen 09/03/2020 12  7 - 30 mg/dL Final     Creatinine 09/03/2020 0.72  0.66 - 1.25 mg/dL Final     GFR Estimate 09/03/2020 >90  >60 mL/min/[1.73_m2] Final    Comment: Non  GFR Calc  Starting 12/18/2018, serum creatinine based estimated GFR (eGFR) will be   calculated using the Chronic Kidney Disease Epidemiology Collaboration   (CKD-EPI) equation.       GFR Estimate If Black 09/03/2020 >90  >60 mL/min/[1.73_m2] Final    Comment:  GFR Calc  Starting 12/18/2018, serum creatinine based estimated GFR (eGFR) will be   calculated using the Chronic Kidney Disease Epidemiology Collaboration   (CKD-EPI) equation.       Calcium 09/03/2020 9.3  8.5 - 10.1 mg/dL Final     Creatinine Urine 09/03/2020 101  mg/dL Final     Albumin Urine mg/L 09/03/2020 19  mg/L Final     Albumin Urine mg/g Cr 09/03/2020 18.42* 0 - 17 mg/g Cr Final               Assessment & Plan     Type 2 diabetes mellitus with hyperglycemia, without long-term current use of insulin (H)  Chronic, controlled.  No change in treatment plan. It was recommended he start on a statin, he declined.  Has had side effects from these in the past.   - metFORMIN (GLUCOPHAGE) 1000 MG tablet; Take 1 tablet (1,000 mg) by mouth 2 times daily (with meals)  - Continuous Blood Gluc Sensor (PlaySpanSTYLE CHAIM 14 DAY SENSOR) MISC; 1 Device every 14 days Apply sensor as directed every 14 days  - blood glucose monitoring (ACCU-CHEK MULTICLIX) lancets; Use to test blood sugar one time daily or as directed.  - blood glucose (NO BRAND SPECIFIED) test strip; Use to test blood sugars one time daily or as directed  - glipiZIDE (GLUCOTROL) 5 MG tablet; Take  "1 tablet (5 mg) by mouth 2 times daily (before meals)  - Hemoglobin A1c  - LDL cholesterol direct  - Basic metabolic panel  (Ca, Cl, CO2, Creat, Gluc, K, Na, BUN)  - Albumin Random Urine Quantitative with Creat Ratio  - Continuous Blood Gluc Sensor (FREESTYLE CHAIM 14 DAY SENSOR) MISC; 1 each every 14 days Change every 14 days.    Tobacco use disorder  - nicotine (NICODERM CQ) 21 MG/24HR 24 hr patch; Place 1 patch onto the skin every 24 hours    Chronic left shoulder pain  - XR Shoulder Left G/E 3 Views; Future  - Orthopedic & Spine  Referral; Future    Chronic pain of left knee  - XR Knee Left 1/2 Views; Future  - Orthopedic & Spine  Referral; Future     BMI:   Estimated body mass index is 32.35 kg/m  as calculated from the following:    Height as of this encounter: 1.732 m (5' 8.2\").    Weight as of this encounter: 97.1 kg (214 lb).   Weight management plan: Discussed healthy diet and exercise guidelines        See Patient Instructions    Return in about 6 months (around 3/3/2021) for Diabetes Visit.    YOLI Guerrero [facility]    "

## 2020-09-03 NOTE — PATIENT INSTRUCTIONS
Labs will be done today.   For normal results, you will receive a letter with the results in about 2 weeks.  If anything is abnormal or unexpected, someone from the clinic will call you.      We will have you see the orthopedic doctor about your shoulder and knee pain     Start the Nicotine patches.

## 2020-09-04 ENCOUNTER — TELEPHONE (OUTPATIENT)
Dept: FAMILY MEDICINE | Facility: OTHER | Age: 55
End: 2020-09-04

## 2020-09-04 NOTE — TELEPHONE ENCOUNTER
----- Message from YOLI Guerrero CNP sent at 9/4/2020  6:59 AM CDT -----  Please call    His labs showed his A1C is 7.3, which is slightly improved from last time.  His cholesterol is high.  It would be recommended he be on a statin for this.  If he is willing to try another one (had side effects previously), let me know and I can send a prescription.     Electronically signed by Sandy Francois CNP.

## 2020-09-04 NOTE — TELEPHONE ENCOUNTER
Spoke with patient and informed of results below. Patient understood. At this time he would like to think about it before being placed on a statin medication. Routing to provider as FYI. Please close encounter once read.   Rosetta Haines MA

## 2020-09-04 NOTE — TELEPHONE ENCOUNTER
----- Message from YOLI Guerrero CNP sent at 9/4/2020  6:58 AM CDT -----  Please call and let him know his shoulder x-ray was normal.  His knee x-ray showed the lump is bone - likely scar tissue from a previous injury.  He should follow up with orthopedics as we discussed.     Electronically signed by Sandy Francois CNP.

## 2021-01-27 DIAGNOSIS — E11.65 TYPE 2 DIABETES MELLITUS WITH HYPERGLYCEMIA, WITHOUT LONG-TERM CURRENT USE OF INSULIN (H): ICD-10-CM

## 2021-01-27 NOTE — LETTER
January 29, 2021      Tim Gaona  [id number] [facility]   [place] [address]        Dear Tim,     You are due to be seen to recheck your meds and establish care with a new provider , please call and make an appt.       Sincerely,        Marcos Lira, DO

## 2021-01-28 NOTE — TELEPHONE ENCOUNTER
Kenyon Poole MD  [facility] 32 minutes ago (11:57 AM)     Please call patient to schedule follow up to establish new PCP.   Kenyon Poole MD

## 2021-01-28 NOTE — TELEPHONE ENCOUNTER
Left message for call back. See msg. Below. When patient calls back please schedule him an appointment to establish care, and document this was done please.     Lolly Ram CMA ([facility])

## 2021-01-28 NOTE — TELEPHONE ENCOUNTER
Routing refill request to provider for review/approval because:  Diabetic follow up appointment needed.     Velia Quinteros RN

## 2021-03-15 DIAGNOSIS — E11.65 TYPE 2 DIABETES MELLITUS WITH HYPERGLYCEMIA, WITHOUT LONG-TERM CURRENT USE OF INSULIN (H): ICD-10-CM

## 2021-03-17 RX ORDER — GLIPIZIDE 5 MG/1
5 TABLET ORAL
Qty: 180 TABLET | Refills: 0 | Status: SHIPPED | OUTPATIENT
Start: 2021-03-17 | End: 2021-03-19

## 2021-03-17 NOTE — TELEPHONE ENCOUNTER
Patient has an upcoming appointment with Dr. Galvin    Routing refill request to provider for review/approval because:  Labs not current:  A1C    JOSEY Coreas, RN  [facility]

## 2021-03-19 ENCOUNTER — OFFICE VISIT (OUTPATIENT)
Dept: FAMILY MEDICINE | Facility: CLINIC | Age: 56
End: 2021-03-19
Payer: COMMERCIAL

## 2021-03-19 VITALS
WEIGHT: 220.6 LBS | BODY MASS INDEX: 33.35 KG/M2 | SYSTOLIC BLOOD PRESSURE: 126 MMHG | HEART RATE: 88 BPM | TEMPERATURE: 97.2 F | DIASTOLIC BLOOD PRESSURE: 72 MMHG | RESPIRATION RATE: 16 BRPM | OXYGEN SATURATION: 98 %

## 2021-03-19 DIAGNOSIS — E11.65 TYPE 2 DIABETES MELLITUS WITH HYPERGLYCEMIA, WITHOUT LONG-TERM CURRENT USE OF INSULIN (H): Primary | ICD-10-CM

## 2021-03-19 DIAGNOSIS — E78.5 HYPERLIPIDEMIA LDL GOAL <100: ICD-10-CM

## 2021-03-19 LAB
CHOLEST SERPL-MCNC: 164 MG/DL
HBA1C MFR BLD: 8.2 % (ref 0–5.6)
HDLC SERPL-MCNC: 31 MG/DL
LDLC SERPL CALC-MCNC: 115 MG/DL
NONHDLC SERPL-MCNC: 133 MG/DL
TRIGL SERPL-MCNC: 91 MG/DL

## 2021-03-19 PROCEDURE — 83036 HEMOGLOBIN GLYCOSYLATED A1C: CPT | Performed by: FAMILY MEDICINE

## 2021-03-19 PROCEDURE — 99214 OFFICE O/P EST MOD 30 MIN: CPT | Performed by: FAMILY MEDICINE

## 2021-03-19 PROCEDURE — 36415 COLL VENOUS BLD VENIPUNCTURE: CPT | Performed by: FAMILY MEDICINE

## 2021-03-19 PROCEDURE — 80061 LIPID PANEL: CPT | Performed by: FAMILY MEDICINE

## 2021-03-19 RX ORDER — GLIPIZIDE 5 MG/1
TABLET ORAL
Qty: 180 TABLET | Refills: 0 | Status: SHIPPED | OUTPATIENT
Start: 2021-03-19 | End: 2021-08-11

## 2021-03-19 ASSESSMENT — PAIN SCALES - GENERAL: PAINLEVEL: NO PAIN (0)

## 2021-03-19 NOTE — NURSING NOTE
Health Maintenance Due   Topic Date Due     ADVANCE CARE PLANNING  Never done     EYE EXAM  Never done     Pneumococcal Vaccine: Pediatrics (0 to 5 Years) and At-Risk Patients (6 to 64 Years) (1 of 2 - PPSV23) Never done     COVID-19 Vaccine (1) Never done     HEPATITIS B IMMUNIZATION (1 of 3 - Risk 3-dose series) Never done     PREVENTIVE CARE VISIT  03/30/2010     ZOSTER IMMUNIZATION (1 of 2) Never done     DIABETIC FOOT EXAM  09/20/2018     LIPID  04/15/2020     INFLUENZA VACCINE (1) 09/01/2020     A1C  12/03/2020     Leilani Montemayor, [facility]

## 2021-03-23 ENCOUNTER — MYC MEDICAL ADVICE (OUTPATIENT)
Dept: FAMILY MEDICINE | Facility: CLINIC | Age: 56
End: 2021-03-23

## 2021-03-23 DIAGNOSIS — E11.65 TYPE 2 DIABETES MELLITUS WITH HYPERGLYCEMIA, WITHOUT LONG-TERM CURRENT USE OF INSULIN (H): ICD-10-CM

## 2021-03-26 RX ORDER — FLASH GLUCOSE SENSOR
1 KIT MISCELLANEOUS
Qty: 6 EACH | Refills: 1 | Status: SHIPPED | OUTPATIENT
Start: 2021-03-26 | End: 2022-08-02

## 2021-04-03 ENCOUNTER — HEALTH MAINTENANCE LETTER (OUTPATIENT)
Age: 56
End: 2021-04-03

## 2021-07-18 ENCOUNTER — HEALTH MAINTENANCE LETTER (OUTPATIENT)
Age: 56
End: 2021-07-18

## 2021-08-08 DIAGNOSIS — E11.65 TYPE 2 DIABETES MELLITUS WITH HYPERGLYCEMIA, WITHOUT LONG-TERM CURRENT USE OF INSULIN (H): ICD-10-CM

## 2021-08-10 NOTE — TELEPHONE ENCOUNTER
"Requested Prescriptions   Pending Prescriptions Disp Refills    glipiZIDE (GLUCOTROL) 5 MG tablet [Pharmacy Med Name: GLIPIZIDE 5MG TABS] 180 tablet 0     Sig: TAKE TWO TABLETS BY MOUTH EVERY MORNING WITH BREAKFAST AND TAKE ONE TABLET BY MOUTH EVERY EVENING WITH SUPPER        Sulfonylurea Agents Failed - 8/8/2021 12:50 PM        Failed - Patient has documented A1c within the specified period of time.     If HgbA1C is 8 or greater, it needs to be on file within the past 3 months.  If less than 8, must be on file within the past 6 months.     Recent Labs   Lab Test 03/19/21  0942   A1C 8.2*             Passed - Medication is active on med list        Passed - Patient is age 18 or older        Passed - Patient has a recent creatinine (normal) within the past 12 mos.     Recent Labs   Lab Test 09/03/20  1508   CR 0.72       Ok to refill medication if creatinine is low          Passed - Recent (6 mo) or future (30 days) visit within the authorizing provider's specialty     Patient had office visit in the last 6 months or has a visit in the next 30 days with authorizing provider or within the authorizing provider's specialty.  See \"Patient Info\" tab in inbasket, or \"Choose Columns\" in Meds & Orders section of the refill encounter.                Last Written Prescription Date:  3/19/2021  Last Fill Quantity: 180,  # refills: 0   Last office visit: 3/19/2021 with prescribing provider:  Kamar return in June for labs not done   Future Office Visit:          Routing refill request to provider for review/approval because:  Labs out of range:  A1C      Diana Jones RN  [facility]                 "

## 2021-08-10 NOTE — TELEPHONE ENCOUNTER
"Requested Prescriptions   Pending Prescriptions Disp Refills    metFORMIN (GLUCOPHAGE) 1000 MG tablet [Pharmacy Med Name: METFORMIN HCL 1000MG TABS] 180 tablet 0     Sig: TAKE ONE TABLET BY MOUTH TWICE A DAY WITH MEALS        Biguanide Agents Failed - 8/8/2021 12:49 PM        Failed - Patient has documented A1c within the specified period of time.     If HgbA1C is 8 or greater, it needs to be on file within the past 3 months.  If less than 8, must be on file within the past 6 months.     Recent Labs   Lab Test 03/19/21  0942   A1C 8.2*             Passed - Patient is age 10 or older        Passed - Patient's CR is NOT>1.4 OR Patient's EGFR is NOT<45 within past 12 mos.       Recent Labs   Lab Test 09/03/20  1508   GFRESTIMATED >90   GFRESTBLACK >90       Recent Labs   Lab Test 09/03/20  1508   CR 0.72             Passed - Patient does NOT have a diagnosis of CHF.        Passed - Medication is active on med list        Passed - Recent (6 mo) or future (30 days) visit within the authorizing provider's specialty     Patient had office visit in the last 6 months or has a visit in the next 30 days with authorizing provider or within the authorizing provider's specialty.  See \"Patient Info\" tab in inbasket, or \"Choose Columns\" in Meds & Orders section of the refill encounter.                ast Written Prescription Date:  3/25/2021  Last Fill Quantity: 180,  # refills: 0   Last office visit: 3/19/2021 with prescribing provider:  Kamar return in June for labs not done   Future Office Visit:          Routing refill request to provider for review/approval because:  Labs out of range:  A1C      Diana Jones RN  [facility]   "

## 2021-08-11 RX ORDER — GLIPIZIDE 5 MG/1
TABLET ORAL
Qty: 180 TABLET | Refills: 0 | Status: SHIPPED | OUTPATIENT
Start: 2021-08-11 | End: 2021-12-14

## 2021-09-12 ENCOUNTER — HEALTH MAINTENANCE LETTER (OUTPATIENT)
Age: 56
End: 2021-09-12

## 2021-10-21 DIAGNOSIS — E11.65 TYPE 2 DIABETES MELLITUS WITH HYPERGLYCEMIA, WITHOUT LONG-TERM CURRENT USE OF INSULIN (H): ICD-10-CM

## 2021-10-25 NOTE — TELEPHONE ENCOUNTER
Routing refill request to provider for review/approval because:  Labs not current:  A1C, Creatinine, GFR  Patient needs to be seen because:  due for diabetic follow up    MERT CoreasN, RN  [facility]

## 2021-11-07 ENCOUNTER — HEALTH MAINTENANCE LETTER (OUTPATIENT)
Age: 56
End: 2021-11-07

## 2021-12-09 ENCOUNTER — HOME INFUSION (PRE-WILLOW HOME INFUSION) (OUTPATIENT)
Dept: PHARMACY | Facility: CLINIC | Age: 56
End: 2021-12-09
Payer: COMMERCIAL

## 2021-12-10 NOTE — PROGRESS NOTES
This is a recent snapshot of the patient's [facility] medical record.  For current drug dose and complete information and questions, call [phone] or In Basket pool, [facility]v home infusion ([id number])  CSN Number:  [id number]

## 2021-12-11 DIAGNOSIS — E11.65 TYPE 2 DIABETES MELLITUS WITH HYPERGLYCEMIA, WITHOUT LONG-TERM CURRENT USE OF INSULIN (H): ICD-10-CM

## 2021-12-12 ENCOUNTER — HOME INFUSION (PRE-WILLOW HOME INFUSION) (OUTPATIENT)
Dept: PHARMACY | Facility: CLINIC | Age: 56
End: 2021-12-12
Payer: COMMERCIAL

## 2021-12-14 RX ORDER — GLIPIZIDE 5 MG/1
TABLET ORAL
Qty: 180 TABLET | Refills: 0 | Status: SHIPPED | OUTPATIENT
Start: 2021-12-14 | End: 2022-01-28

## 2021-12-14 NOTE — TELEPHONE ENCOUNTER
Glipizide  Routing refill request to provider for review/approval because:  Labs not current:  A1C, CRE  Patient needs to be seen because:  6 mos visit due    Metformin  Routing refill request to provider for review/approval because:  Labs not current:  A1C, CROR eGFR out of range  Need 6 mos visit      Rajinder Clayton RN

## 2022-01-27 DIAGNOSIS — E11.65 TYPE 2 DIABETES MELLITUS WITH HYPERGLYCEMIA, WITHOUT LONG-TERM CURRENT USE OF INSULIN (H): ICD-10-CM

## 2022-01-28 RX ORDER — GLIPIZIDE 5 MG/1
TABLET ORAL
Qty: 180 TABLET | Refills: 0 | Status: SHIPPED | OUTPATIENT
Start: 2022-01-28 | End: 2022-06-14

## 2022-02-23 NOTE — PROGRESS NOTES
This is a recent snapshot of the patient's [facility] medical record.  For current drug dose and complete information and questions, call [phone] or In Basket pool, fv home infusion ([id number])  CSN Number:  [id number]

## 2022-02-27 ENCOUNTER — HEALTH MAINTENANCE LETTER (OUTPATIENT)
Age: 57
End: 2022-02-27

## 2022-04-19 DIAGNOSIS — E11.65 TYPE 2 DIABETES MELLITUS WITH HYPERGLYCEMIA, WITHOUT LONG-TERM CURRENT USE OF INSULIN (H): ICD-10-CM

## 2022-04-19 NOTE — LETTER
[address] [address]  Phone: [phone]    April 22, 2022      Tim CRUZ Jimenez                                                                                                                                [id number] [address] [address] [place] [id number]      Dear Mr. Gaona,    We are concerned about your health care.  We recently provided you with a medication refill.  Many medications require routine follow-up with your Doctor.      At this time we ask that: You schedule an appointment for your annual physical and diabetes check. Please schedule through [facility] or by calling [phone]    Your prescription: Has been refilled so you may have time for the above noted follow-up.      Thank you,      Dr. Valles/ Care Team

## 2022-04-20 NOTE — TELEPHONE ENCOUNTER
Pending Prescriptions:                       Disp   Refills    metFORMIN (GLUCOPHAGE) 1000 MG tablet [Pha*60 tab*0        Sig: TAKE ONE TABLET BY MOUTH TWICE A DAY WITH MEALS    Routing refill request to provider for review/approval because:  Labs not current:    Creatinine   Date Value Ref Range Status   09/03/2020 0.72 0.66 - 1.25 mg/dL Final     Lab Results   Component Value Date    A1C 8.2 03/19/2021    A1C 7.3 09/03/2020    A1C 7.8 10/30/2019    A1C 9.0 04/15/2019    A1C 7.2 05/10/2018     Will forward to team to schedule patient for yearly/diabetes.  Tsering Rodriguez RN

## 2022-04-24 ENCOUNTER — HEALTH MAINTENANCE LETTER (OUTPATIENT)
Age: 57
End: 2022-04-24

## 2022-06-05 DIAGNOSIS — E11.65 TYPE 2 DIABETES MELLITUS WITH HYPERGLYCEMIA, WITHOUT LONG-TERM CURRENT USE OF INSULIN (H): ICD-10-CM

## 2022-06-07 NOTE — TELEPHONE ENCOUNTER
Routing refill request to provider for review/approval because:  Jessa given x1 and patient did not follow up, please advise  Patient needs to be seen because it has been more than 1 year since last office visit.    MERT CoreasN, RN  [facility]

## 2022-06-09 DIAGNOSIS — E11.65 TYPE 2 DIABETES MELLITUS WITH HYPERGLYCEMIA, WITHOUT LONG-TERM CURRENT USE OF INSULIN (H): ICD-10-CM

## 2022-06-14 RX ORDER — GLIPIZIDE 5 MG/1
TABLET ORAL
Qty: 180 TABLET | Refills: 0 | Status: SHIPPED | OUTPATIENT
Start: 2022-06-14 | End: 2022-07-11

## 2022-06-14 NOTE — TELEPHONE ENCOUNTER
Pending Prescriptions:                       Disp   Refills    glipiZIDE (GLUCOTROL) 5 MG tablet [Pharmac*180 ta*0        Sig: TAKE TWO TABLETS BY MOUTH EVERY MORNING AND TAKE ONE           TABLET BY MOUTH EVERY EVENING    Routing refill request to provider for review/approval because:  Labs not current:  A1c, creatinine clearance  A break in medication  Patient needs to be seen because it has been more than 1 year since last office visit.     Margaret Keita, MERTN, RN

## 2022-06-19 ENCOUNTER — HEALTH MAINTENANCE LETTER (OUTPATIENT)
Age: 57
End: 2022-06-19

## 2022-07-09 DIAGNOSIS — E11.65 TYPE 2 DIABETES MELLITUS WITH HYPERGLYCEMIA, WITHOUT LONG-TERM CURRENT USE OF INSULIN (H): ICD-10-CM

## 2022-07-11 RX ORDER — GLIPIZIDE 5 MG/1
TABLET ORAL
Qty: 180 TABLET | Refills: 0 | Status: SHIPPED | OUTPATIENT
Start: 2022-07-11 | End: 2022-08-02

## 2022-07-11 NOTE — TELEPHONE ENCOUNTER
Glipizide  Medication is being filled for 1 time refill only due to:  Patient needs to be seen because it has been more than one year since last visit.  Patient has scheduled office visit on 8//2022 for further refills.    Maday Guzman RN

## 2022-07-11 NOTE — TELEPHONE ENCOUNTER
Metformin  Medication is being filled for 1 time refill only due to:  patient has scheduled office visit 8/2/2022 for further refills     Maday Guzman RN

## 2022-08-02 ENCOUNTER — OFFICE VISIT (OUTPATIENT)
Dept: FAMILY MEDICINE | Facility: CLINIC | Age: 57
End: 2022-08-02

## 2022-08-02 VITALS
WEIGHT: 214 LBS | BODY MASS INDEX: 32.35 KG/M2 | RESPIRATION RATE: 14 BRPM | TEMPERATURE: 97 F | HEART RATE: 92 BPM | OXYGEN SATURATION: 98 % | DIASTOLIC BLOOD PRESSURE: 80 MMHG | SYSTOLIC BLOOD PRESSURE: 130 MMHG

## 2022-08-02 DIAGNOSIS — E11.65 TYPE 2 DIABETES MELLITUS WITH HYPERGLYCEMIA, WITHOUT LONG-TERM CURRENT USE OF INSULIN (H): ICD-10-CM

## 2022-08-02 DIAGNOSIS — Z13.220 SCREENING FOR HYPERLIPIDEMIA: Primary | ICD-10-CM

## 2022-08-02 PROCEDURE — 99213 OFFICE O/P EST LOW 20 MIN: CPT | Performed by: FAMILY MEDICINE

## 2022-08-02 RX ORDER — GLIPIZIDE 5 MG/1
TABLET ORAL
Qty: 360 TABLET | Refills: 3 | Status: ON HOLD | OUTPATIENT
Start: 2022-08-02 | End: 2023-07-08

## 2022-08-02 ASSESSMENT — PAIN SCALES - GENERAL: PAINLEVEL: NO PAIN (0)

## 2022-08-02 NOTE — PROGRESS NOTES
Assessment & Plan       ICD-10-CM    1. Screening for hyperlipidemia  Z13.220    2. Type 2 diabetes mellitus with hyperglycemia, without long-term current use of insulin (H)  E11.65 HEMOGLOBIN A1C     metFORMIN (GLUCOPHAGE) 1000 MG tablet     glipiZIDE (GLUCOTROL) 5 MG tablet     HEMOGLOBIN A1C      Returns for recheck.  Due for refills.  Historically fairly well controlled diabetes.  Encouraged ophthalmology exam.  Denies evidence of peripheral neuropathy.  Further recommendations based on his A1c.  If greater than 8, would recommend he increase his glipizide to 10 mg twice daily.    Lab Results   Component Value Date    A1C 8.2 03/19/2021    A1C 7.3 09/03/2020    A1C 7.8 10/30/2019    A1C 9.0 04/15/2019    A1C 7.2 05/10/2018          No follow-ups on file.    Ricardo Cervantes MD  [facility]e is a 57 year old, presenting for the following health issues:  Diabetes      History of Present Illness       Diabetes:   He presents for follow up of diabetes.  He is checking home blood glucose a few times a month. He checks blood glucose after meals.  Blood glucose is sometimes over 200 and never under 70. When his blood glucose is low, the patient is asymptomatic for confusion, blurred vision, lethargy and reports not feeling dizzy, shaky, or weak.  He has no concerns regarding his diabetes at this time.  He is not experiencing numbness or burning in feet, excessive thirst, blurry vision, weight changes or redness, sores or blisters on feet. The patient has not had a diabetic eye exam in the last 12 months.         He eats 2-3 servings of fruits and vegetables daily.He consumes 0 sweetened beverage(s) daily.He exercises with enough effort to increase his heart rate 20 to 29 minutes per day.  He exercises with enough effort to increase his heart rate 3 or less days per week. He is missing 2 dose(s) of medications per week.       Checks when he 'feels high' and can be  180-200s          Review of Systems         Objective    /80   Pulse 92   Temp 97  F (36.1  C) (Temporal)   Resp 14   Wt 97.1 kg (214 lb)   SpO2 98%   BMI 32.35 kg/m    Body mass index is 32.35 kg/m .  Physical Exam   GENERAL: healthy, alert and no distress  NECK: no adenopathy, no asymmetry, masses, or scars and thyroid normal to palpation  RESP: lungs clear to auscultation - no rales, rhonchi or wheezes  CV: regular rate and rhythm, normal S1 S2, no S3 or S4, no murmur, click or rub, no peripheral edema and peripheral pulses strong  ABDOMEN: soft, nontender, no hepatosplenomegaly, no masses and bowel sounds normal  MS: no gross musculoskeletal defects noted, no edema                    .  ..

## 2022-08-03 ENCOUNTER — MYC MEDICAL ADVICE (OUTPATIENT)
Dept: FAMILY MEDICINE | Facility: CLINIC | Age: 57
End: 2022-08-03

## 2022-08-03 LAB — HBA1C MFR BLD: 8.6 % (ref 0–5.6)

## 2022-08-03 PROCEDURE — 83036 HEMOGLOBIN GLYCOSYLATED A1C: CPT | Performed by: FAMILY MEDICINE

## 2022-08-03 PROCEDURE — 36415 COLL VENOUS BLD VENIPUNCTURE: CPT | Performed by: FAMILY MEDICINE

## 2023-06-16 NOTE — PROGRESS NOTES
"    Assessment & Plan     Type 2 diabetes mellitus with hyperglycemia, without long-term current use of insulin (H)  His hemoglobin A1c is gone up to 8.2 from 7.3.  This came back after he left.  We discussed increasing his glipizide to 2   5 mg tablets in the morning and 1 in the evening if his A1c came back higher.  We will call and notify him and do this.  Recheck in 3 months.  He is to check his blood sugars.  - Hemoglobin A1c  - glipiZIDE (GLUCOTROL) 5 MG tablet; Take 2 tablets in the morning with breakfast and 1 tablet in the evening with supper.    Hyperlipidemia LDL goal <100  He does not believe in statins and worries about them.  We will recheck his lipids today and decide whether he would need to be on something.  Will call and discuss this with him.  - Lipid panel reflex to direct LDL Fasting      45 minutes spent on the date of the encounter doing chart review, history and exam, documentation and further activities as noted above       BMI:   Estimated body mass index is 33.35 kg/m  as calculated from the following:    Height as of 9/3/20: 1.732 m (5' 8.2\").    Weight as of this encounter: 100.1 kg (220 lb 9.6 oz).   Weight management plan: Discussed healthy diet and exercise guidelines    Work on weight loss  Regular exercise    No follow-ups on file.    Dionisio Galvin MD  [facility]    Parvez Mcgee is a 55 year old who presents for the following health issues: Follow-up of his diabetes and hyperlipidemia.  He is on Metformin 1000 mg twice a day.  Also glipizide 5 mg twice a day.  Blood sugars have been up a little bit.  Last hemoglobin A1c was 7.3 in for recheck now.  He has elevated LDL but is leery of statins and has not wanted to be on anything.  He is fasting today so we can recheck this.    HPI     Diabetes Follow-up    How often are you checking your blood sugar? One time daily  What time of day are you checking your blood sugars (select all that apply)?  Before " meals  Have you had any blood sugars above 200?  Yes occasionally  Have you had any blood sugars below 70?  No    What symptoms do you notice when your blood sugar is low?  None    What concerns do you have today about your diabetes? Other: just need to get checked out and get refills     Do you have any of these symptoms? (Select all that apply)  No numbness or tingling in feet.  No redness, sores or blisters on feet.  No complaints of excessive thirst.  No reports of blurry vision.  No significant changes to weight.    Have you had a diabetic eye exam in the last 12 months? No        BP Readings from Last 2 Encounters:   03/19/21 126/72   09/03/20 127/83     Hemoglobin A1C (%)   Date Value   09/03/2020 7.3 (H)   10/30/2019 7.8 (H)     LDL Cholesterol Calculated (mg/dL)   Date Value   04/15/2019 115 (H)   09/20/2017 116 (H)     LDL Cholesterol Direct (mg/dL)   Date Value   09/03/2020 127 (H)           How many servings of fruits and vegetables do you eat daily?  2-3    On average, how many sweetened beverages do you drink each day (Examples: soda, juice, sweet tea, etc.  Do NOT count diet or artificially sweetened beverages)?   0    How many days per week do you exercise enough to make your heart beat faster? 3 or less    How many minutes a day do you exercise enough to make your heart beat faster? 20 - 29    How many days per week do you miss taking your medication? One evening or one morning        Review of Systems   Constitutional, HEENT, cardiovascular, pulmonary, gi and gu systems are negative, except as otherwise noted.      Objective    /72   Pulse 88   Temp 97.2  F (36.2  C) (Temporal)   Resp 16   Wt 100.1 kg (220 lb 9.6 oz)   SpO2 98%   BMI 33.35 kg/m    Body mass index is 33.35 kg/m .  Physical Exam   GENERAL: healthy, alert and no distress  EYES: Eyes grossly normal to inspection, PERRL and conjunctivae and sclerae normal  NECK: no adenopathy, no asymmetry, masses, or scars and thyroid  normal to palpation  RESP: lungs clear to auscultation - no rales, rhonchi or wheezes  CV: regular rate and rhythm, normal S1 S2, no S3 or S4, no murmur, click or rub, no peripheral edema and peripheral pulses strong  MS: no gross musculoskeletal defects noted, no edema  SKIN: no suspicious lesions or rashes  PSYCH: mentation appears normal, affect normal/bright    Results for orders placed or performed in visit on 03/19/21 (from the past 24 hour(s))   Hemoglobin A1c   Result Value Ref Range    Hemoglobin A1C 8.2 (H) 0 - 5.6 %   Lipid panel reflex to direct LDL Fasting   Result Value Ref Range    Cholesterol 164 <200 mg/dL    Triglycerides 91 <150 mg/dL    HDL Cholesterol 31 (L) >39 mg/dL    LDL Cholesterol Calculated 115 (H) <100 mg/dL    Non HDL Cholesterol 133 (H) <130 mg/dL                  Hydroxychloroquine Pregnancy And Lactation Text: This medication has been shown to cause fetal harm but it isn't assigned a Pregnancy Risk Category. There are small amounts excreted in breast milk.

## 2023-07-06 ENCOUNTER — HOSPITAL ENCOUNTER (INPATIENT)
Facility: CLINIC | Age: 58
LOS: 1 days | Discharge: HOME | End: 2023-07-08
Attending: HOSPITALIST | Admitting: INTERNAL MEDICINE
Payer: MEDICAID

## 2023-07-06 ENCOUNTER — APPOINTMENT (OUTPATIENT)
Dept: ULTRASOUND IMAGING | Facility: CLINIC | Age: 58
End: 2023-07-06
Attending: NURSE PRACTITIONER
Payer: MEDICAID

## 2023-07-06 ENCOUNTER — HOSPITAL ENCOUNTER (EMERGENCY)
Facility: CLINIC | Age: 58
Discharge: ANOTHER HOSPITAL | End: 2023-07-06
Attending: EMERGENCY MEDICINE | Admitting: EMERGENCY MEDICINE
Payer: MEDICAID

## 2023-07-06 ENCOUNTER — APPOINTMENT (OUTPATIENT)
Dept: GENERAL RADIOLOGY | Facility: CLINIC | Age: 58
End: 2023-07-06
Attending: EMERGENCY MEDICINE
Payer: MEDICAID

## 2023-07-06 VITALS
DIASTOLIC BLOOD PRESSURE: 77 MMHG | WEIGHT: 209.6 LBS | BODY MASS INDEX: 30.01 KG/M2 | OXYGEN SATURATION: 94 % | TEMPERATURE: 97.7 F | HEIGHT: 70 IN | RESPIRATION RATE: 12 BRPM | HEART RATE: 80 BPM | SYSTOLIC BLOOD PRESSURE: 113 MMHG

## 2023-07-06 DIAGNOSIS — E11.65 TYPE 2 DIABETES MELLITUS WITH HYPERGLYCEMIA, WITHOUT LONG-TERM CURRENT USE OF INSULIN (H): ICD-10-CM

## 2023-07-06 DIAGNOSIS — I21.4 NSTEMI (NON-ST ELEVATED MYOCARDIAL INFARCTION) (H): Primary | ICD-10-CM

## 2023-07-06 DIAGNOSIS — F17.200 TOBACCO USE DISORDER: ICD-10-CM

## 2023-07-06 DIAGNOSIS — I21.4 NON-STEMI (NON-ST ELEVATED MYOCARDIAL INFARCTION) (H): ICD-10-CM

## 2023-07-06 PROBLEM — R07.9 CHEST PAIN: Status: ACTIVE | Noted: 2023-07-06

## 2023-07-06 LAB
ANION GAP SERPL CALCULATED.3IONS-SCNC: 11 MMOL/L (ref 7–15)
BASOPHILS # BLD AUTO: 0 10E3/UL (ref 0–0.2)
BASOPHILS NFR BLD AUTO: 0 %
BUN SERPL-MCNC: 11.8 MG/DL (ref 6–20)
CALCIUM SERPL-MCNC: 9.2 MG/DL (ref 8.6–10)
CHLORIDE SERPL-SCNC: 100 MMOL/L (ref 98–107)
CREAT SERPL-MCNC: 0.64 MG/DL (ref 0.67–1.17)
DEPRECATED HCO3 PLAS-SCNC: 24 MMOL/L (ref 22–29)
EOSINOPHIL # BLD AUTO: 0.1 10E3/UL (ref 0–0.7)
EOSINOPHIL NFR BLD AUTO: 1 %
ERYTHROCYTE [DISTWIDTH] IN BLOOD BY AUTOMATED COUNT: 12.7 % (ref 10–15)
GFR SERPL CREATININE-BSD FRML MDRD: >90 ML/MIN/1.73M2
GLUCOSE BLDC GLUCOMTR-MCNC: 286 MG/DL (ref 70–99)
GLUCOSE BLDC GLUCOMTR-MCNC: 352 MG/DL (ref 70–99)
GLUCOSE SERPL-MCNC: 277 MG/DL (ref 70–99)
HBA1C MFR BLD: 9.9 %
HCT VFR BLD AUTO: 43.8 % (ref 40–53)
HGB BLD-MCNC: 15 G/DL (ref 13.3–17.7)
IMM GRANULOCYTES # BLD: 0 10E3/UL
IMM GRANULOCYTES NFR BLD: 0 %
LYMPHOCYTES # BLD AUTO: 1.6 10E3/UL (ref 0.8–5.3)
LYMPHOCYTES NFR BLD AUTO: 20 %
MCH RBC QN AUTO: 30.9 PG (ref 26.5–33)
MCHC RBC AUTO-ENTMCNC: 34.2 G/DL (ref 31.5–36.5)
MCV RBC AUTO: 90 FL (ref 78–100)
MONOCYTES # BLD AUTO: 0.5 10E3/UL (ref 0–1.3)
MONOCYTES NFR BLD AUTO: 6 %
NEUTROPHILS # BLD AUTO: 5.6 10E3/UL (ref 1.6–8.3)
NEUTROPHILS NFR BLD AUTO: 73 %
NRBC # BLD AUTO: 0 10E3/UL
NRBC BLD AUTO-RTO: 0 /100
PLATELET # BLD AUTO: 168 10E3/UL (ref 150–450)
POTASSIUM SERPL-SCNC: 4.3 MMOL/L (ref 3.4–5.3)
RBC # BLD AUTO: 4.85 10E6/UL (ref 4.4–5.9)
SODIUM SERPL-SCNC: 135 MMOL/L (ref 136–145)
TROPONIN T SERPL HS-MCNC: 117 NG/L
TROPONIN T SERPL HS-MCNC: 133 NG/L
TROPONIN T SERPL HS-MCNC: 138 NG/L
TROPONIN T SERPL HS-MCNC: 86 NG/L
UFH PPP CHRO-ACNC: 0.15 IU/ML
WBC # BLD AUTO: 7.8 10E3/UL (ref 4–11)

## 2023-07-06 PROCEDURE — 99285 EMERGENCY DEPT VISIT HI MDM: CPT | Mod: 25 | Performed by: EMERGENCY MEDICINE

## 2023-07-06 PROCEDURE — 250N000013 HC RX MED GY IP 250 OP 250 PS 637: Performed by: NURSE PRACTITIONER

## 2023-07-06 PROCEDURE — G0378 HOSPITAL OBSERVATION PER HR: HCPCS

## 2023-07-06 PROCEDURE — 96366 THER/PROPH/DIAG IV INF ADDON: CPT | Performed by: EMERGENCY MEDICINE

## 2023-07-06 PROCEDURE — 71046 X-RAY EXAM CHEST 2 VIEWS: CPT

## 2023-07-06 PROCEDURE — 93971 EXTREMITY STUDY: CPT | Mod: LT

## 2023-07-06 PROCEDURE — 83036 HEMOGLOBIN GLYCOSYLATED A1C: CPT | Performed by: NURSE PRACTITIONER

## 2023-07-06 PROCEDURE — 93010 ELECTROCARDIOGRAM REPORT: CPT | Performed by: EMERGENCY MEDICINE

## 2023-07-06 PROCEDURE — 250N000011 HC RX IP 250 OP 636: Mod: JZ | Performed by: NURSE PRACTITIONER

## 2023-07-06 PROCEDURE — 250N000011 HC RX IP 250 OP 636: Mod: JZ | Performed by: EMERGENCY MEDICINE

## 2023-07-06 PROCEDURE — 82962 GLUCOSE BLOOD TEST: CPT

## 2023-07-06 PROCEDURE — 250N000013 HC RX MED GY IP 250 OP 250 PS 637: Performed by: EMERGENCY MEDICINE

## 2023-07-06 PROCEDURE — 84484 ASSAY OF TROPONIN QUANT: CPT | Performed by: EMERGENCY MEDICINE

## 2023-07-06 PROCEDURE — 36415 COLL VENOUS BLD VENIPUNCTURE: CPT | Performed by: EMERGENCY MEDICINE

## 2023-07-06 PROCEDURE — 85520 HEPARIN ASSAY: CPT | Performed by: NURSE PRACTITIONER

## 2023-07-06 PROCEDURE — 36415 COLL VENOUS BLD VENIPUNCTURE: CPT | Performed by: NURSE PRACTITIONER

## 2023-07-06 PROCEDURE — 93005 ELECTROCARDIOGRAM TRACING: CPT | Performed by: EMERGENCY MEDICINE

## 2023-07-06 PROCEDURE — 96376 TX/PRO/DX INJ SAME DRUG ADON: CPT | Performed by: EMERGENCY MEDICINE

## 2023-07-06 PROCEDURE — 99223 1ST HOSP IP/OBS HIGH 75: CPT | Mod: 25 | Performed by: INTERNAL MEDICINE

## 2023-07-06 PROCEDURE — 250N000012 HC RX MED GY IP 250 OP 636 PS 637: Performed by: NURSE PRACTITIONER

## 2023-07-06 PROCEDURE — 84484 ASSAY OF TROPONIN QUANT: CPT | Performed by: HOSPITALIST

## 2023-07-06 PROCEDURE — 85025 COMPLETE CBC W/AUTO DIFF WBC: CPT | Performed by: EMERGENCY MEDICINE

## 2023-07-06 PROCEDURE — 80048 BASIC METABOLIC PNL TOTAL CA: CPT | Performed by: EMERGENCY MEDICINE

## 2023-07-06 PROCEDURE — 99222 1ST HOSP IP/OBS MODERATE 55: CPT | Performed by: NURSE PRACTITIONER

## 2023-07-06 PROCEDURE — 96365 THER/PROPH/DIAG IV INF INIT: CPT | Performed by: EMERGENCY MEDICINE

## 2023-07-06 RX ORDER — HEPARIN SODIUM 10000 [USP'U]/100ML
0-5000 INJECTION, SOLUTION INTRAVENOUS CONTINUOUS
Status: DISCONTINUED | OUTPATIENT
Start: 2023-07-06 | End: 2023-07-06 | Stop reason: HOSPADM

## 2023-07-06 RX ORDER — NICOTINE POLACRILEX 4 MG
15-30 LOZENGE BUCCAL
Status: DISCONTINUED | OUTPATIENT
Start: 2023-07-06 | End: 2023-07-07

## 2023-07-06 RX ORDER — NICOTINE 21 MG/24HR
1 PATCH, TRANSDERMAL 24 HOURS TRANSDERMAL DAILY
Status: DISCONTINUED | OUTPATIENT
Start: 2023-07-06 | End: 2023-07-08 | Stop reason: HOSPADM

## 2023-07-06 RX ORDER — MAGNESIUM HYDROXIDE/ALUMINUM HYDROXICE/SIMETHICONE 120; 1200; 1200 MG/30ML; MG/30ML; MG/30ML
30 SUSPENSION ORAL EVERY 4 HOURS PRN
Status: DISCONTINUED | OUTPATIENT
Start: 2023-07-06 | End: 2023-07-08 | Stop reason: HOSPADM

## 2023-07-06 RX ORDER — ACETAMINOPHEN 650 MG/1
650 SUPPOSITORY RECTAL EVERY 6 HOURS PRN
Status: DISCONTINUED | OUTPATIENT
Start: 2023-07-06 | End: 2023-07-08 | Stop reason: HOSPADM

## 2023-07-06 RX ORDER — ATORVASTATIN CALCIUM 10 MG/1
10 TABLET, FILM COATED ORAL EVERY EVENING
Status: DISCONTINUED | OUTPATIENT
Start: 2023-07-06 | End: 2023-07-07

## 2023-07-06 RX ORDER — DEXTROSE MONOHYDRATE 25 G/50ML
25-50 INJECTION, SOLUTION INTRAVENOUS
Status: DISCONTINUED | OUTPATIENT
Start: 2023-07-06 | End: 2023-07-07

## 2023-07-06 RX ORDER — ASPIRIN 81 MG/1
324 TABLET, CHEWABLE ORAL ONCE
Status: COMPLETED | OUTPATIENT
Start: 2023-07-06 | End: 2023-07-06

## 2023-07-06 RX ORDER — NITROGLYCERIN 0.4 MG/1
0.4 TABLET SUBLINGUAL EVERY 5 MIN PRN
Status: DISCONTINUED | OUTPATIENT
Start: 2023-07-06 | End: 2023-07-06 | Stop reason: HOSPADM

## 2023-07-06 RX ORDER — ACETAMINOPHEN 325 MG/1
650 TABLET ORAL EVERY 6 HOURS PRN
Status: DISCONTINUED | OUTPATIENT
Start: 2023-07-06 | End: 2023-07-07

## 2023-07-06 RX ORDER — HEPARIN SODIUM 10000 [USP'U]/100ML
0-5000 INJECTION, SOLUTION INTRAVENOUS CONTINUOUS
Status: DISCONTINUED | OUTPATIENT
Start: 2023-07-06 | End: 2023-07-07

## 2023-07-06 RX ORDER — NITROGLYCERIN 0.4 MG/1
0.4 TABLET SUBLINGUAL EVERY 5 MIN PRN
Status: DISCONTINUED | OUTPATIENT
Start: 2023-07-06 | End: 2023-07-08 | Stop reason: HOSPADM

## 2023-07-06 RX ADMIN — ASPIRIN 81 MG 324 MG: 81 TABLET ORAL at 10:56

## 2023-07-06 RX ADMIN — ATORVASTATIN CALCIUM 10 MG: 10 TABLET, FILM COATED ORAL at 20:29

## 2023-07-06 RX ADMIN — HEPARIN SODIUM 1150 UNITS/HR: 10000 INJECTION, SOLUTION INTRAVENOUS at 11:12

## 2023-07-06 RX ADMIN — NICOTINE 1 PATCH: 21 PATCH, EXTENDED RELEASE TRANSDERMAL at 17:35

## 2023-07-06 RX ADMIN — HEPARIN SODIUM 1300 UNITS/HR: 10000 INJECTION, SOLUTION INTRAVENOUS at 21:18

## 2023-07-06 RX ADMIN — INSULIN ASPART 3 UNITS: 100 INJECTION, SOLUTION INTRAVENOUS; SUBCUTANEOUS at 16:54

## 2023-07-06 RX ADMIN — NITROGLYCERIN 0.4 MG: 0.4 TABLET SUBLINGUAL at 12:50

## 2023-07-06 ASSESSMENT — ACTIVITIES OF DAILY LIVING (ADL)
ADLS_ACUITY_SCORE: 31
ADLS_ACUITY_SCORE: 35
ADLS_ACUITY_SCORE: 35

## 2023-07-06 ASSESSMENT — COLUMBIA-SUICIDE SEVERITY RATING SCALE - C-SSRS
4. HAVE YOU HAD THESE THOUGHTS AND HAD SOME INTENTION OF ACTING ON THEM?: NO
5. HAVE YOU STARTED TO WORK OUT OR WORKED OUT THE DETAILS OF HOW TO KILL YOURSELF? DO YOU INTEND TO CARRY OUT THIS PLAN?: NO
6. HAVE YOU EVER DONE ANYTHING, STARTED TO DO ANYTHING, OR PREPARED TO DO ANYTHING TO END YOUR LIFE?: NO
2. HAVE YOU ACTUALLY HAD ANY THOUGHTS OF KILLING YOURSELF IN THE PAST MONTH?: NO
1. IN THE PAST MONTH, HAVE YOU WISHED YOU WERE DEAD OR WISHED YOU COULD GO TO SLEEP AND NOT WAKE UP?: NO
3. HAVE YOU BEEN THINKING ABOUT HOW YOU MIGHT KILL YOURSELF?: NO

## 2023-07-06 NOTE — PHARMACY-ADMISSION MEDICATION HISTORY
Pharmacist Admission Medication History    Admission medication history is complete. The information provided in this note is only as accurate as the sources available at the time of the update.    Medication reconciliation/reorder completed by provider prior to medication history? No    Information Source(s): Patient and CareEverywhere/SureScripts via in-person    Pertinent Information:     Changes made to PTA medication list:    Added: vitamin E    Deleted: None    Changed: None    Medication Affordability:       Allergies reviewed with patient and updates made in EHR: yes    Medication History Completed By: Akil Aliheyder, RPH 7/6/2023 4:06 PM    Prior to Admission medications    Medication Sig Last Dose Taking? Auth Provider Long Term End Date   Ascorbic Acid (VITAMIN C) 500 MG CAPS Take 1 capsule by mouth daily  Yes Reported, Patient     ASPIRIN PO Take 81 mg by mouth daily 7/6/2023 Yes Reported, Patient     B Complex Vitamins (VITAMIN B COMPLEX PO) Take 1 tablet by mouth daily  Yes Reported, Patient     glipiZIDE (GLUCOTROL) 5 MG tablet TAKE TWO TABLETS BY MOUTH EVERY MORNING AND TAKE ONE TABLET BY MOUTH IN THE EVENING 7/5/2023 Yes Ricardo Cervantes MD Yes    ibuprofen (ADVIL/MOTRIN) 200 MG tablet Take 400 mg by mouth every 8 hours as needed for mild pain prn Yes Reported, Patient     metFORMIN (GLUCOPHAGE) 1000 MG tablet Take 1 tablet (1,000 mg) by mouth 2 times daily (with meals) 7/5/2023 Yes Ricardo Cervantes MD Yes    VITAMIN E PO Take 1 tablet by mouth daily  Yes Unknown, Entered By History     ACE/ARB/ARNI NOT PRESCRIBED, INTENTIONAL, Please choose reason not prescribed, below   Jozef Patricia PA-C     blood glucose (NO BRAND SPECIFIED) test strip Use to test blood sugars one time daily or as directed   Sandy Francois APRN CNP     blood glucose monitoring (ACCU-CHEK MULTICLIX) lancets Use to test blood sugar one time daily or as directed.   Sandy Francois APRN CNP     blood glucose  monitoring (NO BRAND SPECIFIED) meter device kit Use to test blood sugar 2 times daily or as directed.   Bill Panda MD     nicotine (NICODERM CQ) 21 MG/24HR 24 hr patch Place 1 patch onto the skin every 24 hours  Patient not taking: Reported on 8/2/2022   Sandy Francois APRN CNP     nicotine (NICOTROL) 10 MG inhaler Inhale 1 cartridge as needed for smoking cessation. Maximum of 16 cartridges/day.  Patient not taking: No sig reported   Sandy Francois APRN CNP

## 2023-07-06 NOTE — Clinical Note
Max pressure = 12 celso. Total duration = 32 seconds.     Max pressure = 18 celso. Total duration = 32 seconds.    Balloon reinflated a second time: Max pressure = 18 celso. Total duration = 32 seconds.  Balloon reinflated a third time: Max pressure = 18 celso. Total duration = 30 seconds.  Balloon reinflated a fourth time:

## 2023-07-06 NOTE — ED NOTES
"Patient with 4/10 chest \"pressure\" after getting up to use bathroom, 1 SL nitroglycerin given with \"pressure\" down to a 1/10.   "

## 2023-07-06 NOTE — ED PROVIDER NOTES
History     Chief Complaint   Patient presents with     Chest Pain     HPI  Tim Gaona is a 58 year old male who presents to the emergency department secondary to chest discomfort.  He describes it as sharp and radiating through to the back.  Initially when it started last night it was 10 out of 10 in intensity and associated with lightheadedness but no nausea, diaphoresis, dyspnea.  He could not be reproduced by palpation of the chest.  It was intense until he took a couple of aspirin that he felt better.  It did not resolve altogether.  He was able to get some sleep last night.  This morning he returned and has been waxing and waning in intensity.  No other new symptoms.  It was not necessarily brought on by exertion.  He does not have a history of coronary artery disease or angiogram or stenting.  Pain is mild now.    He is a diabetic and a smoker.  No history of high blood pressure or high cholesterol.    Allergies:  Allergies   Allergen Reactions     Aleve [Naproxen] Itching       Problem List:    Patient Active Problem List    Diagnosis Date Noted     Type 2 diabetes mellitus with hyperglycemia, without long-term current use of insulin (H) 05/10/2018     Priority: Medium     Tobacco use disorder 05/18/2016     Priority: Medium     Obesity, Class I, BMI 30-34.9 10/26/2015     Priority: Medium     Hyperlipidemia LDL goal <100 04/03/2012     Priority: Medium        Past Medical History:    Past Medical History:   Diagnosis Date     Diabetes mellitus, type 2 (H)      Obesity, Class I, BMI 30-34.9 10/26/2015       Past Surgical History:    Past Surgical History:   Procedure Laterality Date     HC REMOVE TONSILS/ADENOIDS,<11 Y/O  1971    T & A <12y.o.     LAPAROSCOPIC CHOLECYSTECTOMY N/A 11/19/2017    Procedure: LAPAROSCOPIC CHOLECYSTECTOMY;  Laparoscopic Cholecystectomy;  Surgeon: Farhana Cedillo MD;  Location:  OR       Family History:    Family History   Problem Relation Age of Onset     Thyroid Disease  "Mother      Gallbladder Disease Mother      Heart Disease Father      Lipids Father      Thyroid Disease Paternal Grandmother      Gallbladder Disease Paternal Grandmother      Thyroid Disease Paternal Grandfather      Gallbladder Disease Sister        Social History:  Marital Status:   [2]  Social History     Tobacco Use     Smoking status: Former     Packs/day: 0.75     Years: 32.00     Pack years: 24.00     Types: Cigarettes     Quit date: 2020     Years since quittin.5     Smokeless tobacco: Former     Quit date: 2020     Tobacco comments:     started age 20   Vaping Use     Vaping Use: Never used   Substance Use Topics     Alcohol use: No     Alcohol/week: 0.0 standard drinks of alcohol     Drug use: No        Medications:    Ascorbic Acid (VITAMIN C) 500 MG CAPS  ASPIRIN PO  B Complex Vitamins (VITAMIN B COMPLEX PO)  glipiZIDE (GLUCOTROL) 5 MG tablet  metFORMIN (GLUCOPHAGE) 1000 MG tablet  ACE/ARB/ARNI NOT PRESCRIBED, INTENTIONAL,  blood glucose (NO BRAND SPECIFIED) test strip  blood glucose monitoring (ACCU-CHEK MULTICLIX) lancets  blood glucose monitoring (NO BRAND SPECIFIED) meter device kit  ibuprofen (ADVIL/MOTRIN) 200 MG tablet  nicotine (NICODERM CQ) 21 MG/24HR 24 hr patch  nicotine (NICOTROL) 10 MG inhaler          Review of Systems   All other systems reviewed and are negative.      Physical Exam   BP: (!) 142/100  Pulse: 81  Temp: 97.7  F (36.5  C)  Resp: 18  Height: 176.5 cm (5' 9.5\")  Weight: 95.1 kg (209 lb 9.6 oz)  SpO2: 100 %      Physical Exam  Vitals and nursing note reviewed.   Constitutional:       General: He is not in acute distress.     Appearance: He is well-developed. He is not diaphoretic.   HENT:      Head: Normocephalic and atraumatic.   Eyes:      General: No scleral icterus.  Cardiovascular:      Rate and Rhythm: Normal rate and regular rhythm.      Heart sounds: Normal heart sounds. Heart sounds not distant. No murmur heard.      No diastolic murmur is " present.  Pulmonary:      Breath sounds: No decreased breath sounds, wheezing or rhonchi.   Chest:      Chest wall: No tenderness.   Musculoskeletal:         General: Normal range of motion.      Cervical back: Normal range of motion and neck supple.      Right lower leg: No tenderness.      Left lower leg: No tenderness.   Skin:     General: Skin is warm and dry.      Findings: No rash.   Neurological:      General: No focal deficit present.      Mental Status: He is alert and oriented to person, place, and time.         ED Course                 Procedures              EKG Interpretation:      Interpreted by Ramon Madsen MD  Time reviewed: 0918  Symptoms at time of EKG: Chest pain  Rhythm: normal sinus   Rate: normal  Axis: normal  Ectopy: none  Conduction: normal  ST Segments/ T Waves: No ST-T wave changes  Q Waves: none  Comparison to prior: Unchanged    Clinical Impression: normal EKG                 Results for orders placed or performed during the hospital encounter of 07/06/23 (from the past 24 hour(s))   CBC with platelets differential    Narrative    The following orders were created for panel order CBC with platelets differential.  Procedure                               Abnormality         Status                     ---------                               -----------         ------                     CBC with platelets and d...[[id number]]                      Final result                 Please view results for these tests on the individual orders.   Basic metabolic panel   Result Value Ref Range    Sodium 135 (L) 136 - 145 mmol/L    Potassium 4.3 3.4 - 5.3 mmol/L    Chloride 100 98 - 107 mmol/L    Carbon Dioxide (CO2) 24 22 - 29 mmol/L    Anion Gap 11 7 - 15 mmol/L    Urea Nitrogen 11.8 6.0 - 20.0 mg/dL    Creatinine 0.64 (L) 0.67 - 1.17 mg/dL    Calcium 9.2 8.6 - 10.0 mg/dL    Glucose 277 (H) 70 - 99 mg/dL    GFR Estimate >90 >60 mL/min/1.73m2   Troponin T, High Sensitivity   Result Value Ref  Range    Troponin T, High Sensitivity 86 (H) <=22 ng/L   CBC with platelets and differential   Result Value Ref Range    WBC Count 7.8 4.0 - 11.0 10e3/uL    RBC Count 4.85 4.40 - 5.90 10e6/uL    Hemoglobin 15.0 13.3 - 17.7 g/dL    Hematocrit 43.8 40.0 - 53.0 %    MCV 90 78 - 100 fL    MCH 30.9 26.5 - 33.0 pg    MCHC 34.2 31.5 - 36.5 g/dL    RDW 12.7 10.0 - 15.0 %    Platelet Count 168 150 - 450 10e3/uL    % Neutrophils 73 %    % Lymphocytes 20 %    % Monocytes 6 %    % Eosinophils 1 %    % Basophils 0 %    % Immature Granulocytes 0 %    NRBCs per 100 WBC 0 <1 /100    Absolute Neutrophils 5.6 1.6 - 8.3 10e3/uL    Absolute Lymphocytes 1.6 0.8 - 5.3 10e3/uL    Absolute Monocytes 0.5 0.0 - 1.3 10e3/uL    Absolute Eosinophils 0.1 0.0 - 0.7 10e3/uL    Absolute Basophils 0.0 0.0 - 0.2 10e3/uL    Absolute Immature Granulocytes 0.0 <=0.4 10e3/uL    Absolute NRBCs 0.0 10e3/uL   XR Chest 2 Views    Narrative    XR CHEST 2 VIEWS 7/6/2023 10:09 AM    HISTORY: Chest pain    COMPARISON: X-ray 5/18/2016      Impression    IMPRESSION: Probable calcified granuloma at the right lung base. Tiny  nodular opacities in the peripheral right lung, most likely prominent  vessel on end or small granulomas. As a precaution, recommend  attention on x-ray follow-up in 3-6 months. No acute findings. No  focal pneumonic consolidation or pleural effusion. Normal heart size.    CORINA RAMÍREZ MD         SYSTEM ID:  [id number]       Medications   heparin 25,000 units in 0.45% NaCl 250 mL ANTICOAGULANT infusion (1,150 Units/hr Intravenous $New Bag 7/6/23 1112)   aspirin (ASA) chewable tablet 324 mg (324 mg Oral $Given 7/6/23 1056)   heparin loading dose for LOW INTENSITY TREATMENT * Give BEFORE starting heparin infusion (5,700 Units Intravenous $Given 7/6/23 1111)       Assessments & Plan (with Medical Decision Making)  58-year-old with a history of smoking, type 2 diabetes who presented to the emergency department secondary to chest discomfort.  It  started last night while doing light activity and persisted less overnight and returned stronger again this morning and has been waxing and waning in intensity.  Its not reproducible on exam.  EKG is normal.  Blood pressure is 129/80 he is afebrile pulse of 70 cardiac exam is normal.  Troponin was found to be elevated 86.  No other illness to suggest that this is cardiac strain.  I am suspecting this is a non-ST elevation myocardial infarction.  Patient was given full dose aspirin and heparin.  He is in no distress at this time and pain is minimal also was not given any pain medicines or nitroglycerin.  I discussed options and recommendations for transfer to a facility that could perform angiography.  We will get a repeat delta 2-hour troponin here in about an hour.  Most likely will need to transfer.  Patient would like to stay in the [facility] system if possible.  The revised available at [facility].  I discussed the case with the hospitalist,  who accepts the patient in transfer and agrees with the plan.  If the patient develops chest discomfort we will put him on nitro but for now we will hold off.     I have reviewed the nursing notes.    I have reviewed the findings, diagnosis, plan and need for follow up with the patient.                New Prescriptions    No medications on file       Final diagnoses:   Non-STEMI (non-ST elevated myocardial infarction) (H)       7/6/2023   [facility] EMERGENCY DEPT     Ramon Madsen MD  07/06/23 [id number]

## 2023-07-06 NOTE — Clinical Note
Stent deployed in the diagonal. Max pressure = 12 celso. Total duration = 24 seconds. Balloon reinflated a second time:

## 2023-07-06 NOTE — CONSULTS
[facility]    Cardiology Consultation     Date of Admission:  7/6/2023    Assessment & Plan     This is a 58 yr old male with PMH significant for DMII, HLD, obesity admitted with chest pain and elevated TN consistent with  NSTEMI. TN peak to 133, uptrending still. EKG without ST changes.      1. Aspirin loaded, continue aspirin 81 mg daily  2. Heparin infusion started  3. Nitroglycerin infusion, currently chest pain free   4. Coronary angiogram tomorrow,  Npo at midnight  5. Leg ultrasound pending as well   6. On statin for HLD   7. Smoking cessation encouraged     High complexity     Harper Hilliard MD    Primary Care Physician   Physician No Ref-Primary    Reason for Consult     Chest pain     History of Present Illness     This is a 58 yr old male with PMH tobacco use, HLD admitted from [facility] for chest pain over the past few days, both exertional and at rest with increasing duration and frequency. TN elevated to 133, EKG without ischemic changes. Transferred to [facility] and started on heparin infusion,nitrogylcerin infusion. Cardiology consulted for management.     ROS neg for palpitations, syncope, orthopnea, PND, leg edema. Does have lateral thigh pain so ultrasound pending.    Past Medical History   Past Medical History:   Diagnosis Date     Diabetes mellitus, type 2 (H)      Obesity, Class I, BMI 30-34.9 10/26/2015         Past Surgical History   Past Surgical History:   Procedure Laterality Date     HC REMOVE TONSILS/ADENOIDS,<13 Y/O  1971    T & A <12y.o.     LAPAROSCOPIC CHOLECYSTECTOMY N/A 11/19/2017    Procedure: LAPAROSCOPIC CHOLECYSTECTOMY;  Laparoscopic Cholecystectomy;  Surgeon: Farhana Cedillo MD;  Location:  OR       Prior to Admission Medications   Prior to Admission Medications   Prescriptions Last Dose Informant Patient Reported? Taking?   ACE/ARB/ARNI NOT PRESCRIBED, INTENTIONAL,   No No   Sig: Please choose reason not prescribed, below   ASPIRIN PO 7/6/2023  Yes Yes    Sig: Take 81 mg by mouth daily   Ascorbic Acid (VITAMIN C) 500 MG CAPS   Yes Yes   Sig: Take 1 capsule by mouth daily   B Complex Vitamins (VITAMIN B COMPLEX PO)   Yes Yes   Sig: Take 1 tablet by mouth daily   VITAMIN E PO   Yes Yes   Sig: Take 1 tablet by mouth daily   blood glucose (NO BRAND SPECIFIED) test strip   No No   Sig: Use to test blood sugars one time daily or as directed   blood glucose monitoring (ACCU-CHEK MULTICLIX) lancets   No No   Sig: Use to test blood sugar one time daily or as directed.   blood glucose monitoring (NO BRAND SPECIFIED) meter device kit   No No   Sig: Use to test blood sugar 2 times daily or as directed.   glipiZIDE (GLUCOTROL) 5 MG tablet 7/5/2023  No Yes   Sig: TAKE TWO TABLETS BY MOUTH EVERY MORNING AND TAKE ONE TABLET BY MOUTH IN THE EVENING   ibuprofen (ADVIL/MOTRIN) 200 MG tablet prn  Yes Yes   Sig: Take 400 mg by mouth every 8 hours as needed for mild pain   metFORMIN (GLUCOPHAGE) 1000 MG tablet 7/5/2023  No Yes   Sig: Take 1 tablet (1,000 mg) by mouth 2 times daily (with meals)   nicotine (NICODERM CQ) 21 MG/24HR 24 hr patch   No No   Sig: Place 1 patch onto the skin every 24 hours   Patient not taking: Reported on 8/2/2022   nicotine (NICOTROL) 10 MG inhaler   No No   Sig: Inhale 1 cartridge as needed for smoking cessation. Maximum of 16 cartridges/day.   Patient not taking: No sig reported      Facility-Administered Medications: None     Current Facility-Administered Medications   Medication Dose Route Frequency     insulin aspart  1-7 Units Subcutaneous TID AC     insulin aspart  1-5 Units Subcutaneous At Bedtime     nicotine  1 patch Transdermal Daily    And     nicotine   Transdermal Q8H [facility]     sodium chloride (PF)  3 mL Intracatheter Q8H     Current Facility-Administered Medications   Medication Last Rate     heparin 1,150 Units/hr (07/06/23 1600)     Allergies   Allergies   Allergen Reactions     Aleve [Naproxen] Itching     Tolerates ibuprofen       Social  "History    reports that he quit smoking about 2 years ago. His smoking use included cigarettes. He has a 24.00 pack-year smoking history. He quit smokeless tobacco use about 2 years ago. He reports that he does not drink alcohol and does not use drugs.      Family History   I have reviewed this patient's family history and updated it with pertinent information if needed.  Family History   Problem Relation Age of Onset     Thyroid Disease Mother      Gallbladder Disease Mother      Heart Disease Father      Lipids Father      Thyroid Disease Paternal Grandmother      Gallbladder Disease Paternal Grandmother      Thyroid Disease Paternal Grandfather      Gallbladder Disease Sister           Review of Systems   A comprehensive review of system was performed and is negative other than that noted in the HPI or here.     Physical Exam   Vital Signs with Ranges  Temp:  [97.6  F (36.4  C)-97.7  F (36.5  C)] 97.6  F (36.4  C)  Pulse:  [68-84] 68  Resp:  [12-26] 14  BP: (105-142)/() 115/75  SpO2:  [94 %-100 %] 98 %  Wt Readings from Last 4 Encounters:   07/06/23 94.1 kg (207 lb 6.4 oz)   07/06/23 95.1 kg (209 lb 9.6 oz)   08/02/22 97.1 kg (214 lb)   03/19/21 100.1 kg (220 lb 9.6 oz)     No intake/output data recorded.      Vitals: /75   Pulse 68   Temp 97.6  F (36.4  C) (Oral)   Resp 14   Ht 1.778 m (5' 10\")   Wt 94.1 kg (207 lb 6.4 oz)   SpO2 98%   BMI 29.76 kg/m      Physical Exam:   General - Alert and oriented to time place and person in no acute distress  Eyes - No scleral icterus  HEENT - Neck supple, moist mucous membranes  Cardiovascular - RRR no mrg   Extremities - There is no edema  Respiratory - CTAB  Skin - No pallor or cyanosis  Gastrointestinal - Non tender and non distended without rebound or guarding  Psych - Appropriate affect   Neurological - No gross motor neurological focal deficits    No lab results found in last 7 days.    Invalid input(s): TROPONINIES    Recent Labs   Lab " 07/06/23  1553 07/06/23  0954   WBC  --  7.8   HGB  --  15.0   MCV  --  90   PLT  --  168   NA  --  135*   POTASSIUM  --  4.3   CHLORIDE  --  100   CO2  --  24   BUN  --  11.8   CR  --  0.64*   GFRESTIMATED  --  >90   ANIONGAP  --  11   JENNA  --  9.2   * 277*     Recent Labs   Lab Test 03/19/21  0942 09/03/20  1508 04/15/19  1029   CHOL 164  --  165   HDL 31*  --  27*   * 127* 115*   TRIG 91  --  114     Recent Labs   Lab 07/06/23  0954   WBC 7.8   HGB 15.0   HCT 43.8   MCV 90        No results for input(s): PH, PHV, PO2, PO2V, SAT, PCO2, PCO2V, HCO3, HCO3V in the last 168 hours.  No results for input(s): NTBNPI, NTBNP in the last 168 hours.  No results for input(s): DD in the last 168 hours.  No results for input(s): SED, CRP in the last 168 hours.  Recent Labs   Lab 07/06/23  0954        No results for input(s): TSH in the last 168 hours.  No results for input(s): COLOR, APPEARANCE, URINEGLC, URINEBILI, URINEKETONE, SG, UBLD, URINEPH, PROTEIN, UROBILINOGEN, NITRITE, LEUKEST, RBCU, WBCU in the last 168 hours.    Imaging:  Recent Results (from the past 48 hour(s))   XR Chest 2 Views    Narrative    XR CHEST 2 VIEWS 7/6/2023 10:09 AM    HISTORY: Chest pain    COMPARISON: X-ray 5/18/2016      Impression    IMPRESSION: Probable calcified granuloma at the right lung base. Tiny  nodular opacities in the peripheral right lung, most likely prominent  vessel on end or small granulomas. As a precaution, recommend  attention on x-ray follow-up in 3-6 months. No acute findings. No  focal pneumonic consolidation or pleural effusion. Normal heart size.    CORINA RAMÍREZ MD         SYSTEM ID:  [id number]       Echo:  No results found for this or any previous visit (from the past 4320 hour(s)).    Clinically Significant Risk Factors Present on Admission                # Drug Induced Platelet Defect: home medication list includes an antiplatelet medication   # Hypertension: Home medication list includes  "antihypertensive(s)     # DMII: A1C = 9.9 % (Ref range: <5.7 %) within past 6 months    # Overweight: Estimated body mass index is 29.76 kg/m  as calculated from the following:    Height as of this encounter: 1.778 m (5' 10\").    Weight as of this encounter: 94.1 kg (207 lb 6.4 oz).                        "

## 2023-07-06 NOTE — Clinical Note
Hemodynamic equipment used: 5 lead ECG, Lazarus EffectK With 3 Leads, Machine BP Cuff and pulse oximeter probe.

## 2023-07-06 NOTE — H&P
[facility]    History and Physical - Hospitalist Service       Date of Admission:  7/6/2023    Assessment & Plan      Tim Gaona is a 58 year old male admitted on 7/6/2023. He has a PMHx of DMII, tobacco use disorder (current), dyslipidemia, obesity who presents to OSH for evaluation of chest pain.     NSTEMI   Exertional chest pain, waxing and waning in intensity. Initial troponin elevated 86--> 117-->133. ECG shows NSR w/o any ST/TW changes consistent w/ ischemia. CXR with no acute findings.   *Given  mg in ED, initiated on heparin infusion  - continue Heparin infusion  - start nitroglycerin infusion for any acute chest pain  - pt notes left calf pain, some intermittent left lateral thigh pain - LLE ultrasound to rule out DVT negative   - NPO at midnight     Diabetes mellitus, type II, uncontrolled   [PTA metformin, glipizide]  HgbA1c 9.9%.   - hold PTA metformin and glipizide  - start QID glucose monitoring w/ med sliding scale insulin  - mod carb diet today, NPO at midnight     Dyslipidemia  - start atorvastatin 10 mg PO daily on 7/6    Tobacco use disorder  Currently smoking about 0.75 packs/day for last 32 years.   - Nicotine replacement ordered (patch)    Hyponatremia, mild  Sodium 135 on admission to the ED, patient is asymptomatic.  -BMP 7/7 AM    Incidental findings    Probable calcified granuloma  CXR in ED shows possible calcified granuloma in the right lung base, tiny nodular opacities in the peripheral right lung.  -Recommended to have follow-up chest x-ray in 3-6 weeks   -Findings and recommendations for follow-up discussed with patient       Diet:  Mod Carb diet   DVT Prophylaxis: Heparin infusion  Terrazas Catheter: Not present  Lines: None     Cardiac Monitoring: None  Code Status:   Full Code     Clinically Significant Risk Factors Present on Admission                       # Obesity: Estimated body mass index is 30.51 kg/m  as calculated from the following:     "Height as of an earlier encounter on 7/6/23: 1.765 m (5' 9.5\").    Weight as of an earlier encounter on 7/6/23: 95.1 kg (209 lb 9.6 oz).            Disposition Plan      Expected Discharge Date: 07/07/2023              The patient's care was discussed with the Attending Physician, Dr. Hamilton, Bedside Nurse, Patient and Patient's Family.    YOLI [facility]  Hospitalist Service  [facility]  Securely message with [employer] (more info)  Text page via [facility] Paging/Directory     ______________________________________________________________________    Chief Complaint   Chest Pain    History is obtained from the patient    History of Present Illness   Tim Gaona is a 58 year old male who has a PMHx of DMII, tobacco use disorder (current), dyslipidemia, obesity who presents to OSH for evaluation of chest pain.     Patient initially developed exertional chest pain while doing light housework on 7/5 evening.  He took several aspirin and noted the pain improved.  He was able to rest a bit, however the pain returned with increased intensity on 7/6 AM.  Given the increased intensity and concurrent nausea, waxing and waning he presented to the emergency department for evaluation.  Patient denies any other symptoms including dyspnea, vomiting. Pt states he has felt otherwise at his baseline prior to this chest pain episode. No previous CP episodes.    Work-up in the emergency department shows BMP with sodium 135, normal creatinine elevated glucose 277.  Initial troponin 86, 113-->133.  CBC was grossly unremarkable.  Chest x-ray shows no acute findings, incidental findings noted above.  Patient was thought to be having an NSTEMI, and was transferred to higher level of care for probable angiography.  Patient is pain-free without nitroglycerin infusion, initiated  on heparin infusion.    I evaluated the patient shortly after transfer with his wife at the bedside. He is calm, pain free and appears " "in no distress. He denies any current nausea/vomiting, dizziness, palpitations or new symptoms.  Patient notes he has not any recent acute illnesses, respiratory symptoms, cough, fever, chills, urinary issues.      Past Medical History    Past Medical History:   Diagnosis Date     Diabetes mellitus, type 2 (H)      Obesity, Class I, BMI 30-34.9 10/26/2015       Past Surgical History   Past Surgical History:   Procedure Laterality Date     HC REMOVE TONSILS/ADENOIDS,<13 Y/O  1971    T & A <12y.o.     LAPAROSCOPIC CHOLECYSTECTOMY N/A 11/19/2017    Procedure: LAPAROSCOPIC CHOLECYSTECTOMY;  Laparoscopic Cholecystectomy;  Surgeon: Farhana Cedillo MD;  Location:  OR       Prior to Admission Medications   Cannot display prior to admission medications because the patient has not been admitted in this contact.        Physical Exam   Vital Signs:                     /75   Pulse 68   Temp 97.6  F (36.4  C) (Oral)   Resp 14   Ht 1.778 m (5' 10\")   Wt 94.1 kg (207 lb 6.4 oz)   SpO2 98%   BMI 29.76 kg/m      Weight: 0 lbs 0 oz    Physical Exam  Vitals and nursing note reviewed.   Constitutional:       General: He is not in acute distress.     Appearance: Normal appearance. He is not ill-appearing, toxic-appearing or diaphoretic.   HENT:      Mouth/Throat:      Mouth: Mucous membranes are moist.   Eyes:      Pupils: Pupils are equal, round, and reactive to light.   Cardiovascular:      Rate and Rhythm: Normal rate and regular rhythm.      Pulses: Normal pulses.      Heart sounds: Normal heart sounds.   Pulmonary:      Effort: Pulmonary effort is normal.      Breath sounds: Normal breath sounds.   Abdominal:      General: Abdomen is flat. There is no distension.      Palpations: Abdomen is soft.      Tenderness: There is no abdominal tenderness.   Skin:     General: Skin is warm and dry.   Neurological:      General: No focal deficit present.      Mental Status: He is alert and oriented to person, place, and time. "   Psychiatric:         Mood and Affect: Mood normal.       Medical Decision Making     60 MINUTES SPENT BY ME on the date of service doing chart review, history, exam, documentation & further activities per the note.      Data     I have personally reviewed the following data over the past 24 hrs:    7.8  \   15.0   / 168     135 (L) 100 11.8 /  286 (H)   4.3 24 0.64 (L) \       Trop: 133 (HH) BNP: N/A       TSH: N/A T4: N/A A1C: 9.9 (H)       Imaging results reviewed over the past 24 hrs:   Recent Results (from the past 24 hour(s))   XR Chest 2 Views    Narrative    XR CHEST 2 VIEWS 7/6/2023 10:09 AM    HISTORY: Chest pain    COMPARISON: X-ray 5/18/2016      Impression    IMPRESSION: Probable calcified granuloma at the right lung base. Tiny  nodular opacities in the peripheral right lung, most likely prominent  vessel on end or small granulomas. As a precaution, recommend  attention on x-ray follow-up in 3-6 months. No acute findings. No  focal pneumonic consolidation or pleural effusion. Normal heart size.    CORINA RAMÍREZ MD         SYSTEM ID:  [id number]

## 2023-07-06 NOTE — ED NOTES
"Attempted to give report to Judah Lizarraga at this time, they state \"the nurse just found out about this patient and the room is still dirty so we'll have to call back.\"  "

## 2023-07-06 NOTE — ED TRIAGE NOTES
He has chest pain that started and radiates to his back. No cardiac history     Triage Assessment     Row Name 07/06/23 0910       Triage Assessment (Adult)    Airway WDL WDL       Respiratory WDL    Respiratory WDL WDL       Cardiac WDL    Cardiac WDL X;chest pain

## 2023-07-07 ENCOUNTER — APPOINTMENT (OUTPATIENT)
Dept: CARDIOLOGY | Facility: CLINIC | Age: 58
End: 2023-07-07
Attending: NURSE PRACTITIONER
Payer: MEDICAID

## 2023-07-07 PROBLEM — I21.4 NSTEMI (NON-ST ELEVATED MYOCARDIAL INFARCTION) (H): Status: ACTIVE | Noted: 2023-07-07

## 2023-07-07 LAB
ACT BLD: 135 SECONDS (ref 74–150)
ACT BLD: 280 SECONDS (ref 74–150)
ANION GAP SERPL CALCULATED.3IONS-SCNC: 12 MMOL/L (ref 7–15)
BUN SERPL-MCNC: 12.5 MG/DL (ref 6–20)
CALCIUM SERPL-MCNC: 9 MG/DL (ref 8.6–10)
CHLORIDE SERPL-SCNC: 101 MMOL/L (ref 98–107)
CHOLEST SERPL-MCNC: 177 MG/DL
CREAT SERPL-MCNC: 0.66 MG/DL (ref 0.67–1.17)
DEPRECATED HCO3 PLAS-SCNC: 23 MMOL/L (ref 22–29)
GFR SERPL CREATININE-BSD FRML MDRD: >90 ML/MIN/1.73M2
GLUCOSE BLDC GLUCOMTR-MCNC: 213 MG/DL (ref 70–99)
GLUCOSE BLDC GLUCOMTR-MCNC: 279 MG/DL (ref 70–99)
GLUCOSE BLDC GLUCOMTR-MCNC: 283 MG/DL (ref 70–99)
GLUCOSE BLDC GLUCOMTR-MCNC: 291 MG/DL (ref 70–99)
GLUCOSE SERPL-MCNC: 282 MG/DL (ref 70–99)
HDLC SERPL-MCNC: 27 MG/DL
LDLC SERPL CALC-MCNC: 89 MG/DL
LVEF ECHO: NORMAL
NONHDLC SERPL-MCNC: 150 MG/DL
POTASSIUM SERPL-SCNC: 4.1 MMOL/L (ref 3.4–5.3)
SODIUM SERPL-SCNC: 136 MMOL/L (ref 136–145)
TRIGL SERPL-MCNC: 304 MG/DL
UFH PPP CHRO-ACNC: 0.24 IU/ML
UFH PPP CHRO-ACNC: 0.44 IU/ML

## 2023-07-07 PROCEDURE — 250N000011 HC RX IP 250 OP 636: Mod: JZ | Performed by: INTERNAL MEDICINE

## 2023-07-07 PROCEDURE — B2111ZZ FLUOROSCOPY OF MULTIPLE CORONARY ARTERIES USING LOW OSMOLAR CONTRAST: ICD-10-PCS | Performed by: INTERNAL MEDICINE

## 2023-07-07 PROCEDURE — 272N000001 HC OR GENERAL SUPPLY STERILE: Performed by: INTERNAL MEDICINE

## 2023-07-07 PROCEDURE — 027034Z DILATION OF CORONARY ARTERY, ONE ARTERY WITH DRUG-ELUTING INTRALUMINAL DEVICE, PERCUTANEOUS APPROACH: ICD-10-PCS | Performed by: INTERNAL MEDICINE

## 2023-07-07 PROCEDURE — 99233 SBSQ HOSP IP/OBS HIGH 50: CPT | Mod: 25 | Performed by: PHYSICIAN ASSISTANT

## 2023-07-07 PROCEDURE — 250N000013 HC RX MED GY IP 250 OP 250 PS 637: Performed by: INTERNAL MEDICINE

## 2023-07-07 PROCEDURE — C1887 CATHETER, GUIDING: HCPCS | Performed by: INTERNAL MEDICINE

## 2023-07-07 PROCEDURE — C9600 PERC DRUG-EL COR STENT SING: HCPCS | Mod: LD | Performed by: INTERNAL MEDICINE

## 2023-07-07 PROCEDURE — 93454 CORONARY ARTERY ANGIO S&I: CPT | Mod: 26 | Performed by: INTERNAL MEDICINE

## 2023-07-07 PROCEDURE — 999N000208 ECHOCARDIOGRAM COMPLETE

## 2023-07-07 PROCEDURE — 258N000003 HC RX IP 258 OP 636: Performed by: INTERNAL MEDICINE

## 2023-07-07 PROCEDURE — 99153 MOD SED SAME PHYS/QHP EA: CPT | Performed by: INTERNAL MEDICINE

## 2023-07-07 PROCEDURE — 99152 MOD SED SAME PHYS/QHP 5/>YRS: CPT | Performed by: INTERNAL MEDICINE

## 2023-07-07 PROCEDURE — 255N000002 HC RX 255 OP 636: Performed by: HOSPITALIST

## 2023-07-07 PROCEDURE — 36415 COLL VENOUS BLD VENIPUNCTURE: CPT | Performed by: INTERNAL MEDICINE

## 2023-07-07 PROCEDURE — 85520 HEPARIN ASSAY: CPT | Performed by: HOSPITALIST

## 2023-07-07 PROCEDURE — 92928 PRQ TCAT PLMT NTRAC ST 1 LES: CPT | Mod: LD | Performed by: INTERNAL MEDICINE

## 2023-07-07 PROCEDURE — 93306 TTE W/DOPPLER COMPLETE: CPT | Mod: 26 | Performed by: INTERNAL MEDICINE

## 2023-07-07 PROCEDURE — 250N000009 HC RX 250: Performed by: INTERNAL MEDICINE

## 2023-07-07 PROCEDURE — 80061 LIPID PANEL: CPT | Performed by: INTERNAL MEDICINE

## 2023-07-07 PROCEDURE — C1760 CLOSURE DEV, VASC: HCPCS | Performed by: INTERNAL MEDICINE

## 2023-07-07 PROCEDURE — 250N000013 HC RX MED GY IP 250 OP 250 PS 637: Performed by: NURSE PRACTITIONER

## 2023-07-07 PROCEDURE — 93454 CORONARY ARTERY ANGIO S&I: CPT | Performed by: INTERNAL MEDICINE

## 2023-07-07 PROCEDURE — 93005 ELECTROCARDIOGRAM TRACING: CPT

## 2023-07-07 PROCEDURE — 99233 SBSQ HOSP IP/OBS HIGH 50: CPT | Performed by: INTERNAL MEDICINE

## 2023-07-07 PROCEDURE — 210N000002 HC R&B HEART CARE

## 2023-07-07 PROCEDURE — 250N000013 HC RX MED GY IP 250 OP 250 PS 637: Performed by: HOSPITALIST

## 2023-07-07 PROCEDURE — 250N000011 HC RX IP 250 OP 636: Performed by: INTERNAL MEDICINE

## 2023-07-07 PROCEDURE — G0378 HOSPITAL OBSERVATION PER HR: HCPCS

## 2023-07-07 PROCEDURE — 93010 ELECTROCARDIOGRAM REPORT: CPT | Performed by: INTERNAL MEDICINE

## 2023-07-07 PROCEDURE — 82962 GLUCOSE BLOOD TEST: CPT

## 2023-07-07 PROCEDURE — C1874 STENT, COATED/COV W/DEL SYS: HCPCS | Performed by: INTERNAL MEDICINE

## 2023-07-07 PROCEDURE — C1725 CATH, TRANSLUMIN NON-LASER: HCPCS | Performed by: INTERNAL MEDICINE

## 2023-07-07 PROCEDURE — C1769 GUIDE WIRE: HCPCS | Performed by: INTERNAL MEDICINE

## 2023-07-07 PROCEDURE — 85520 HEPARIN ASSAY: CPT | Performed by: NURSE PRACTITIONER

## 2023-07-07 PROCEDURE — 250N000012 HC RX MED GY IP 250 OP 636 PS 637: Performed by: INTERNAL MEDICINE

## 2023-07-07 PROCEDURE — 250N000013 HC RX MED GY IP 250 OP 250 PS 637: Performed by: PHYSICIAN ASSISTANT

## 2023-07-07 PROCEDURE — 85347 COAGULATION TIME ACTIVATED: CPT

## 2023-07-07 PROCEDURE — 258N000003 HC RX IP 258 OP 636: Performed by: HOSPITALIST

## 2023-07-07 PROCEDURE — 36415 COLL VENOUS BLD VENIPUNCTURE: CPT | Performed by: NURSE PRACTITIONER

## 2023-07-07 PROCEDURE — 82310 ASSAY OF CALCIUM: CPT | Performed by: INTERNAL MEDICINE

## 2023-07-07 DEVICE — CLOSURE ANGIOSEAL 6FR 610130: Type: IMPLANTABLE DEVICE | Status: FUNCTIONAL

## 2023-07-07 DEVICE — STENT COR ONYX FRONTIER 15X2.50MM ONYXNG25015UX: Type: IMPLANTABLE DEVICE | Status: FUNCTIONAL

## 2023-07-07 RX ORDER — NALOXONE HYDROCHLORIDE 0.4 MG/ML
0.4 INJECTION, SOLUTION INTRAMUSCULAR; INTRAVENOUS; SUBCUTANEOUS
Status: ACTIVE | OUTPATIENT
Start: 2023-07-07 | End: 2023-07-07

## 2023-07-07 RX ORDER — DEXTROSE MONOHYDRATE 25 G/50ML
25-50 INJECTION, SOLUTION INTRAVENOUS
Status: DISCONTINUED | OUTPATIENT
Start: 2023-07-07 | End: 2023-07-07

## 2023-07-07 RX ORDER — DEXTROSE MONOHYDRATE 25 G/50ML
25-50 INJECTION, SOLUTION INTRAVENOUS
Status: DISCONTINUED | OUTPATIENT
Start: 2023-07-07 | End: 2023-07-08 | Stop reason: HOSPADM

## 2023-07-07 RX ORDER — METOPROLOL TARTRATE 25 MG/1
25 TABLET, FILM COATED ORAL 2 TIMES DAILY
Status: DISCONTINUED | OUTPATIENT
Start: 2023-07-07 | End: 2023-07-08 | Stop reason: HOSPADM

## 2023-07-07 RX ORDER — HYDRALAZINE HYDROCHLORIDE 20 MG/ML
10 INJECTION INTRAMUSCULAR; INTRAVENOUS EVERY 4 HOURS PRN
Status: DISCONTINUED | OUTPATIENT
Start: 2023-07-07 | End: 2023-07-08 | Stop reason: HOSPADM

## 2023-07-07 RX ORDER — ONDANSETRON 4 MG/1
4 TABLET, ORALLY DISINTEGRATING ORAL EVERY 6 HOURS PRN
Status: DISCONTINUED | OUTPATIENT
Start: 2023-07-07 | End: 2023-07-08 | Stop reason: HOSPADM

## 2023-07-07 RX ORDER — LORAZEPAM 2 MG/ML
0.5 INJECTION INTRAMUSCULAR
Status: DISCONTINUED | OUTPATIENT
Start: 2023-07-07 | End: 2023-07-07 | Stop reason: HOSPADM

## 2023-07-07 RX ORDER — ATORVASTATIN CALCIUM 40 MG/1
40 TABLET, FILM COATED ORAL DAILY
Qty: 90 TABLET | Refills: 3 | Status: SHIPPED | OUTPATIENT
Start: 2023-07-07 | End: 2023-07-08

## 2023-07-07 RX ORDER — LORAZEPAM 0.5 MG/1
0.5 TABLET ORAL
Status: DISCONTINUED | OUTPATIENT
Start: 2023-07-07 | End: 2023-07-07 | Stop reason: HOSPADM

## 2023-07-07 RX ORDER — ASPIRIN 81 MG/1
81 TABLET ORAL DAILY
Qty: 30 TABLET | Refills: 3 | Status: SHIPPED | OUTPATIENT
Start: 2023-07-08 | End: 2023-07-08

## 2023-07-07 RX ORDER — METOPROLOL TARTRATE 1 MG/ML
5 INJECTION, SOLUTION INTRAVENOUS
Status: DISCONTINUED | OUTPATIENT
Start: 2023-07-07 | End: 2023-07-08 | Stop reason: HOSPADM

## 2023-07-07 RX ORDER — ASPIRIN 81 MG/1
243 TABLET, CHEWABLE ORAL ONCE
Status: COMPLETED | OUTPATIENT
Start: 2023-07-07 | End: 2023-07-07

## 2023-07-07 RX ORDER — FLUMAZENIL 0.1 MG/ML
0.2 INJECTION, SOLUTION INTRAVENOUS
Status: ACTIVE | OUTPATIENT
Start: 2023-07-07 | End: 2023-07-07

## 2023-07-07 RX ORDER — OXYCODONE HYDROCHLORIDE 5 MG/1
10 TABLET ORAL EVERY 4 HOURS PRN
Status: DISCONTINUED | OUTPATIENT
Start: 2023-07-07 | End: 2023-07-08 | Stop reason: HOSPADM

## 2023-07-07 RX ORDER — NALOXONE HYDROCHLORIDE 0.4 MG/ML
0.2 INJECTION, SOLUTION INTRAMUSCULAR; INTRAVENOUS; SUBCUTANEOUS
Status: ACTIVE | OUTPATIENT
Start: 2023-07-07 | End: 2023-07-07

## 2023-07-07 RX ORDER — FENTANYL CITRATE 50 UG/ML
25 INJECTION, SOLUTION INTRAMUSCULAR; INTRAVENOUS
Status: DISCONTINUED | OUTPATIENT
Start: 2023-07-07 | End: 2023-07-08 | Stop reason: HOSPADM

## 2023-07-07 RX ORDER — HEPARIN SODIUM 1000 [USP'U]/ML
INJECTION, SOLUTION INTRAVENOUS; SUBCUTANEOUS
Status: DISCONTINUED | OUTPATIENT
Start: 2023-07-07 | End: 2023-07-07 | Stop reason: HOSPADM

## 2023-07-07 RX ORDER — ATROPINE SULFATE 0.1 MG/ML
0.5 INJECTION INTRAVENOUS
Status: ACTIVE | OUTPATIENT
Start: 2023-07-07 | End: 2023-07-07

## 2023-07-07 RX ORDER — LISINOPRIL 5 MG/1
5 TABLET ORAL DAILY
Status: DISCONTINUED | OUTPATIENT
Start: 2023-07-07 | End: 2023-07-08 | Stop reason: HOSPADM

## 2023-07-07 RX ORDER — LIDOCAINE 40 MG/G
CREAM TOPICAL
Status: DISCONTINUED | OUTPATIENT
Start: 2023-07-07 | End: 2023-07-07 | Stop reason: HOSPADM

## 2023-07-07 RX ORDER — PRASUGREL 10 MG/1
10 TABLET, FILM COATED ORAL DAILY
Status: DISCONTINUED | OUTPATIENT
Start: 2023-07-08 | End: 2023-07-08 | Stop reason: HOSPADM

## 2023-07-07 RX ORDER — NITROGLYCERIN 0.4 MG/1
TABLET SUBLINGUAL
Qty: 25 TABLET | Refills: 3 | Status: SHIPPED | OUTPATIENT
Start: 2023-07-07 | End: 2023-07-08

## 2023-07-07 RX ORDER — NICOTINE POLACRILEX 4 MG
15-30 LOZENGE BUCCAL
Status: DISCONTINUED | OUTPATIENT
Start: 2023-07-07 | End: 2023-07-08 | Stop reason: HOSPADM

## 2023-07-07 RX ORDER — SODIUM CHLORIDE 9 MG/ML
INJECTION, SOLUTION INTRAVENOUS CONTINUOUS
Status: DISCONTINUED | OUTPATIENT
Start: 2023-07-07 | End: 2023-07-07 | Stop reason: HOSPADM

## 2023-07-07 RX ORDER — NITROGLYCERIN 5 MG/ML
VIAL (ML) INTRAVENOUS
Status: DISCONTINUED | OUTPATIENT
Start: 2023-07-07 | End: 2023-07-07 | Stop reason: HOSPADM

## 2023-07-07 RX ORDER — OXYCODONE HYDROCHLORIDE 5 MG/1
5 TABLET ORAL EVERY 4 HOURS PRN
Status: DISCONTINUED | OUTPATIENT
Start: 2023-07-07 | End: 2023-07-08 | Stop reason: HOSPADM

## 2023-07-07 RX ORDER — ATORVASTATIN CALCIUM 40 MG/1
40 TABLET, FILM COATED ORAL EVERY EVENING
Status: DISCONTINUED | OUTPATIENT
Start: 2023-07-07 | End: 2023-07-08 | Stop reason: HOSPADM

## 2023-07-07 RX ORDER — POTASSIUM CHLORIDE 1500 MG/1
20 TABLET, EXTENDED RELEASE ORAL
Status: DISCONTINUED | OUTPATIENT
Start: 2023-07-07 | End: 2023-07-07 | Stop reason: HOSPADM

## 2023-07-07 RX ORDER — ASPIRIN 325 MG
325 TABLET ORAL ONCE
Status: COMPLETED | OUTPATIENT
Start: 2023-07-07 | End: 2023-07-07

## 2023-07-07 RX ORDER — PRASUGREL 5 MG/1
TABLET, FILM COATED ORAL
Status: DISCONTINUED | OUTPATIENT
Start: 2023-07-07 | End: 2023-07-07 | Stop reason: HOSPADM

## 2023-07-07 RX ORDER — NITROGLYCERIN 0.4 MG/1
0.4 TABLET SUBLINGUAL EVERY 5 MIN PRN
Status: DISCONTINUED | OUTPATIENT
Start: 2023-07-07 | End: 2023-07-07

## 2023-07-07 RX ORDER — ASPIRIN 81 MG/1
81 TABLET ORAL DAILY
Status: DISCONTINUED | OUTPATIENT
Start: 2023-07-08 | End: 2023-07-08 | Stop reason: HOSPADM

## 2023-07-07 RX ORDER — SODIUM CHLORIDE 9 MG/ML
INJECTION, SOLUTION INTRAVENOUS CONTINUOUS
Status: ACTIVE | OUTPATIENT
Start: 2023-07-07 | End: 2023-07-07

## 2023-07-07 RX ORDER — ONDANSETRON 2 MG/ML
4 INJECTION INTRAMUSCULAR; INTRAVENOUS EVERY 6 HOURS PRN
Status: DISCONTINUED | OUTPATIENT
Start: 2023-07-07 | End: 2023-07-08 | Stop reason: HOSPADM

## 2023-07-07 RX ORDER — ACETAMINOPHEN 325 MG/1
650 TABLET ORAL EVERY 4 HOURS PRN
Status: DISCONTINUED | OUTPATIENT
Start: 2023-07-07 | End: 2023-07-08 | Stop reason: HOSPADM

## 2023-07-07 RX ORDER — ASPIRIN 81 MG/1
81 TABLET, CHEWABLE ORAL ONCE
Status: DISCONTINUED | OUTPATIENT
Start: 2023-07-07 | End: 2023-07-07

## 2023-07-07 RX ORDER — FENTANYL CITRATE 50 UG/ML
INJECTION, SOLUTION INTRAMUSCULAR; INTRAVENOUS
Status: DISCONTINUED | OUTPATIENT
Start: 2023-07-07 | End: 2023-07-07 | Stop reason: HOSPADM

## 2023-07-07 RX ORDER — NICOTINE POLACRILEX 4 MG
15-30 LOZENGE BUCCAL
Status: DISCONTINUED | OUTPATIENT
Start: 2023-07-07 | End: 2023-07-07

## 2023-07-07 RX ORDER — IOPAMIDOL 755 MG/ML
INJECTION, SOLUTION INTRAVASCULAR
Status: DISCONTINUED | OUTPATIENT
Start: 2023-07-07 | End: 2023-07-07 | Stop reason: HOSPADM

## 2023-07-07 RX ADMIN — SODIUM CHLORIDE: 9 INJECTION, SOLUTION INTRAVENOUS at 08:44

## 2023-07-07 RX ADMIN — ASPIRIN 325 MG ORAL TABLET 325 MG: 325 PILL ORAL at 08:42

## 2023-07-07 RX ADMIN — ATORVASTATIN CALCIUM 40 MG: 40 TABLET, FILM COATED ORAL at 19:44

## 2023-07-07 RX ADMIN — METOPROLOL TARTRATE 25 MG: 25 TABLET, FILM COATED ORAL at 11:32

## 2023-07-07 RX ADMIN — LISINOPRIL 5 MG: 5 TABLET ORAL at 11:32

## 2023-07-07 RX ADMIN — INSULIN GLARGINE 5 UNITS: 100 INJECTION, SOLUTION SUBCUTANEOUS at 11:33

## 2023-07-07 RX ADMIN — HUMAN ALBUMIN MICROSPHERES AND PERFLUTREN 9 ML: 10; .22 INJECTION, SOLUTION INTRAVENOUS at 11:55

## 2023-07-07 RX ADMIN — NICOTINE 1 PATCH: 21 PATCH, EXTENDED RELEASE TRANSDERMAL at 13:02

## 2023-07-07 RX ADMIN — SODIUM CHLORIDE 75 ML/HR: 9 INJECTION, SOLUTION INTRAVENOUS at 11:00

## 2023-07-07 RX ADMIN — METOPROLOL TARTRATE 25 MG: 25 TABLET, FILM COATED ORAL at 21:12

## 2023-07-07 ASSESSMENT — ACTIVITIES OF DAILY LIVING (ADL)
ADLS_ACUITY_SCORE: 34
ADLS_ACUITY_SCORE: 35
ADLS_ACUITY_SCORE: 31
ADLS_ACUITY_SCORE: 35
ADLS_ACUITY_SCORE: 34
ADLS_ACUITY_SCORE: 34
ADLS_ACUITY_SCORE: 31
ADLS_ACUITY_SCORE: 34
ADLS_ACUITY_SCORE: 34
ADLS_ACUITY_SCORE: 31

## 2023-07-07 NOTE — CONSULTS
"14 ED/Hospital Follow Up with Brice Fowler MD  Friday Jul 14, 2023 9:05 AM  Please plan to arrive at the clinic at your \"Arrive By\" time for your appointment. Our late policy will be enforced based on this time. [address] [address] [phone]     They above follow up with PCP clinic arranged with BMP labs to be drawn at appointment.    Yarely Fernandez RN  Care Coordinator  [facility]  [phone] (text or call)    "

## 2023-07-07 NOTE — PLAN OF CARE
VS:  Stable  Assist by: IND      Cardiac: RRR; CP free overnight   Neuro: A&Ox4  CMS: Denies paresthesias;  Respi: clear to auscultation; on RA   : Continent; voiding freely  GI: Abdo soft, non - tender; BS audible; Last ASHELY 7/6/2023    Strip/Tele: NSR    Pressure areas/Skin:    - PA's intact       LDA's:     PIVC to R arm w/ ongoing Heparin gtt   Next Xa at _0730h__      Plans:    > NPO at midnight   > continue Heparin infusion  > start nitroglycerin infusion for any acute chest pain  > QID glucose monitoring w/ med sliding scale insulin  >  >  > Cardio:   - Coronary angiogram tomorrow,  Npo at midnight                Problem: Plan of Care - These are the overarching goals to be used throughout the patient stay.    Goal: Absence of Hospital-Acquired Illness or Injury  Intervention: Identify and Manage Fall Risk  Recent Flowsheet Documentation  Taken 7/6/2023 2030 by Pamela Avila, RN  Safety Promotion/Fall Prevention:    clutter free environment maintained    increased rounding and observation    lighting adjusted    nonskid shoes/slippers when out of bed    room near nurse's station    safety round/check completed  Intervention: Prevent Skin Injury  Recent Flowsheet Documentation  Taken 7/6/2023 1948 by Pamela Avila, RN  Body Position: position changed independently

## 2023-07-07 NOTE — PRE-PROCEDURE
GENERAL PRE-PROCEDURE:   Procedure:  Heart catheterization possible intervention  Date/Time:  7/7/2023 9:39 AM    Written consent obtained?: Yes    Risks and benefits: Risks, benefits and alternatives were discussed    Consent given by:  Patient  Patient states understanding of procedure being performed: Yes    Patient's understanding of procedure matches consent: Yes    Procedure consent matches procedure scheduled: Yes    Expected level of sedation:  Moderate  Appropriately NPO:  Yes  ASA Class:  2  Lungs:  Lungs clear with good breath sounds bilaterally  Heart:  Normal heart sounds and rate  History & Physical reviewed:  History and physical reviewed and no updates needed  Statement of review:  I have reviewed the lab findings, diagnostic data, medications, and the plan for sedation  risk benefit indication for cath poss intervention discussed with pt  Discussed possible dapt  All questions answered and he wishes to proceed

## 2023-07-07 NOTE — PROGRESS NOTES
[facility] Cardiology Progress Note  Date of Service: 07/07/2023  Primary Cardiologist: Will be Dr. Babak Mcgeesudarshan Gaona is a 58 year old male with DMII, HLP, tobacco use and obesity, who was admitted on 7/6/2023 with chest pain/NSTEMI.    Subjective: Improved discomfort, slight chest tightness at present.     Assessment:  1. NSTEMI, chest pain.     Plan:   1. Coronary angiogram with possible PCI.   2. Echocardiogram.   3. Smoking cessation.   4. Continue aspirin, heparin (stop on arrival to lab), nitroglycerin (stop post-procedure), statin.     Plan was formulated under direction of Dr. Hilliard.   Sanjuanita Patricio PA-C  [facility] - Heart Clinic  Pager: [phone]  Text Page  (7:30am - 4pm M-F)   __________________________________________________________________________  Physical Exam   Temp: 97.9  F (36.6  C) Temp src: Oral BP: 118/80 Pulse: 67   Resp: 18 SpO2: 95 % O2 Device: None (Room air)    Vitals:    07/06/23 1447 07/07/23 0632   Weight: 94.1 kg (207 lb 6.4 oz) 93.4 kg (205 lb 12.8 oz)       GENERAL:  The patient is in no apparent distress.   NECK: CVP appears normal, no masses or thyromegaly.  PULMONARY:  There is a normal respiratory effort. Clear lungs to auscultation bilaterally.   CARDIOVASCULAR:  RRR, normal S1 S2, no m/r/g.  GI:  Non tender abdomen with normoactive bowel sounds and no hepatosplenomegaly. There are no masses palpable.   EXTREMITIES:  No clubbing, cyanosis or edema.  VASCULAR: 2+ Pulses bilaterally in upper and lower extremities.    Medications     - MEDICATION INSTRUCTIONS -       - MEDICATION INSTRUCTIONS -       heparin 1,450 Units/hr (07/07/23 0142)     - MEDICATION INSTRUCTIONS -       sodium chloride 150 mL/hr at 07/07/23 0844       atorvastatin  40 mg Oral QPM     insulin aspart   Subcutaneous TID w/meals     insulin aspart  1-12 Units Subcutaneous Q4H     insulin glargine  5 Units Subcutaneous Once     metoprolol tartrate  25 mg Oral BID      nicotine  1 patch Transdermal Daily    And     nicotine   Transdermal Q8H MITCHEL     sodium chloride (PF)  3 mL Intracatheter Q8H     sodium chloride (PF)  3 mL Intracatheter Q8H       Data   Most Recent 3 CBC's:Recent Labs   Lab Test 07/06/23  0954 11/18/17  2348 11/09/15  0805   WBC 7.8 12.7* 7.1   HGB 15.0 14.0 14.6   MCV 90 92 91    170 155     Most Recent 3 BMP's:Recent Labs   Lab Test 07/07/23  0741 07/07/23  0733 07/07/23  0131 07/06/23  1553 07/06/23  0954 09/03/20  1508     --   --   --  135* 136   POTASSIUM 4.1  --   --   --  4.3 4.2   CHLORIDE 101  --   --   --  100 105   CO2 23  --   --   --  24 25   BUN 12.5  --   --   --  11.8 12   CR 0.66*  --   --   --  0.64* 0.72   ANIONGAP 12  --   --   --  11 6   JENNA 9.0  --   --   --  9.2 9.3   * 279* 291*   < > 277* 190*    < > = values in this interval not displayed.     Most Recent Cholesterol Panel:Recent Labs   Lab Test 03/19/21  0942   CHOL 164   *   HDL 31*   TRIG 91     Most Recent Hemoglobin A1c:Recent Labs   Lab Test 07/06/23  1517   A1C 9.9*

## 2023-07-07 NOTE — PLAN OF CARE
Goal Outcome Evaluation:      Plan of Care Reviewed With: spouse, patient      Patient had angiogram with one stent, tolerated food, denies pain, right groin site is dry intact no bleeding no hematoma no bruit. VSS, RA. Independent in the room, no chest pain.

## 2023-07-07 NOTE — CONSULTS
NUTRITION EDUCATION    REASON FOR ASSESSMENT:  Nutrition education on American Heart Association (AHA) Heart Healthy Diet and consistent carbohydrate diet.    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    INTERVENTIONS:  Nutrition Prescription:    Recommended AHA Heart Healthy Diet and consistent carbohydrate diet    Implementation:     Nutrition Education (Content):  a) reviewed Heart Healthy Diet guidelines  b) provided heart healthy consistent carbohydrate diet handout    Nutrition Education (Application):  a) Discussed current eating habits and recommended alternative food choices    Anticipate good compliance    Diet Education - refer to Education flowsheet    Goals:    Patient verbalizes understanding of diet by sharing that he mostly uses olive oil, eats vegetables frequently, and drinks diet soda or unsweetened iced tea.     All of the above goals met during education session    Follow Up/Monitoring:    Provided RD contact information for future questions

## 2023-07-07 NOTE — PROGRESS NOTES
[facility]    Hospitalist Progress Note    Assessment & Plan   Date of Admission:  7/6/2023        Assessment & Plan  Tim Gaona is a 58 year old male admitted on 7/6/2023. He has a PMHx of DMII, tobacco use disorder (current), dyslipidemia, obesity who presents to OSH for evaluation of chest pain.      NSTEMI   Exertional chest pain, waxing and waning in intensity. Initial troponin elevated 86--> 117-->133. ECG shows NSR w/o any ST/TW changes consistent w/ ischemia. CXR with no acute findings.   *Given  mg in ED, initiated on heparin infusion  - continue Heparin infusion  - start nitroglycerin infusion for any acute chest pain  - pt notes left calf pain, some intermittent left lateral thigh pain - LLE ultrasound to rule out DVT negative     Today. Has mild chest pressure. No sxs that he had on admission. No new issues.   Nl hemodynamics  Nl cardiac exam      Plan;   -cardiology consult, case discussed  - asa, effient per cards  - npo  - angiogram today  - metoprolol 25mg bid.   - Lipitor 40mg every day,lipids, repeat lipds 6 weeks  - cardiac rehab  -Lisinopril 5mg every day.  -cardiac rehab  - close follow up cardiology and pcp  -nutrition consult  -screen for Rosemary    Addendum;   Echo;   There is a small region of distal anterior and apical hypokinesis.  Left ventricular systolic function is mildly reduced.  The visual ejection fraction is 50-55%.  There is mild concentric left ventricular hypertrophy.  There is no comparison study available.  Angiogram/pci:   1. Mod to severe diffuse 3 vessel CAD-- Severe disease in a small Lcx system with  of distal vessel. Diffuse RCA disease with severe lesion in PL but a small system  2. Mild/mod diffuse Lad disease  3. Successful PCI of Diagonal vessel likely culprit vessel treated with 2.5mm LULY stent     Pt feeling well post procedure     Diabetes mellitus, type II, uncontrolled   [PTA metformin, glipizide]  HgbA1c 9.9%.   -prior HbA1C  "6% range. Has had very poor diet last few months- a lot of carbs  - open to once daily lantus.   -  range  - hold PTA metformin and glipizide  - start QID glucose monitoring w/ med sliding scale insulin  - mod carb diet today once taking po  - prandial aspart 1unit per 10 gram carbs  - lantus 5units X 1 now and reeval  - nutrition consult  Close pcp follow up  -has glucometer and supplies at home.        Dyslipidemia  - start atorvastatin 10 mg PO daily on 7/6  Increased to 40mg every day, lipid panel now and 6 weeks     Tobacco use disorder  Currently smoking about 0.75 packs/day for last 32 years.   - Nicotine replacement ordered (patch)  -discuss smoking cessation and tx options with pt, close pcp follow up      Hyponatremia, mild-resolved.   Sodium 135 on admission to the ED, patient is asymptomatic.  -BMP 7/7 AM     Incidental findings     Probable calcified granuloma  CXR in ED shows possible calcified granuloma in the right lung base, tiny nodular opacities in the peripheral right lung.  -Recommended to have follow-up chest x-ray in 3-6 weeks   -Findings and recommendations for follow-up discussed with patient           Diet:  Mod Carb diet   DVT Prophylaxis: Heparin infusion  Terrazas Catheter: Not present  Lines: None     Cardiac Monitoring: None  Code Status:   Full Code         Clinically Significant Risk Factors Present on Admission []Expand by Default                       # Obesity: Estimated body mass index is 30.51 kg/m  as calculated from the following:    Height as of an earlier encounter on 7/6/23: 1.765 m (5' 9.5\").    Weight as of an earlier encounter on 7/6/23: 95.1 kg (209 lb 9.6 oz).                   Disposition Plan     Expected Discharge Date: 07/07/2023                  Angel Monteiro MD, MD  Text Page  (7am to 6pm)  Interval History   Significant chest pressure with radiation to neck , arm and back  All resolved  Except for very mild chest pressure this am  No sob.     -Data " reviewed today: I reviewed all new labs and imaging results over the last 24 hours. I personally reviewed imaging and labs since admission  Physical Exam   Temp: 97.9  F (36.6  C) Temp src: Oral BP: 100/73 Pulse: 61   Resp: 16 SpO2: 99 % O2 Device: None (Room air)    Vitals:    07/06/23 1447 07/07/23 0632   Weight: 94.1 kg (207 lb 6.4 oz) 93.4 kg (205 lb 12.8 oz)     Vital Signs with Ranges  Temp:  [97.6  F (36.4  C)-97.9  F (36.6  C)] 97.9  F (36.6  C)  Pulse:  [61-80] 61  Resp:  [12-25] 16  BP: (100-124)/(60-80) 100/73  SpO2:  [94 %-99 %] 99 %  No intake/output data recorded.    Constitutional: In bed, nad, appears comfortable  Respiratory: CTAB  Cardiovascular: RRR no r/g/m  GI: soft, nt, nd  Skin/Integumen: no rash or edema  Neuro; nl speech and mentation  Psych: nl affect      Medications     - MEDICATION INSTRUCTIONS -       - MEDICATION INSTRUCTIONS -       Percutaneous Coronary Intervention orders placed (this is information for BPA alerting)       sodium chloride         [START ON 7/8/2023] aspirin  81 mg Oral Daily     atorvastatin  40 mg Oral QPM     insulin aspart   Subcutaneous TID w/meals     insulin aspart  1-12 Units Subcutaneous Q4H     lisinopril  5 mg Oral Daily     metoprolol tartrate  25 mg Oral BID     nicotine  1 patch Transdermal Daily    And     nicotine   Transdermal Q8H MITCHEL     [START ON 7/8/2023] prasugrel  10 mg Oral Daily     sodium chloride (PF)  3 mL Intracatheter Q8H       Data   Recent Labs   Lab 07/07/23  1134 07/07/23  0741 07/07/23  0733 07/06/23  1553 07/06/23  0954   WBC  --   --   --   --  7.8   HGB  --   --   --   --  15.0   MCV  --   --   --   --  90   PLT  --   --   --   --  168   NA  --  136  --   --  135*   POTASSIUM  --  4.1  --   --  4.3   CHLORIDE  --  101  --   --  100   CO2  --  23  --   --  24   BUN  --  12.5  --   --  11.8   CR  --  0.66*  --   --  0.64*   ANIONGAP  --  12  --   --  11   JENNA  --  9.0  --   --  9.2   * 282* 279*   < > 277*    < > = values in  this interval not displayed.       Imaging:   Recent Results (from the past 24 hour(s))   US Lower Extremity Venous Duplex Left    Narrative    EXAM: US LOWER EXTREMITY VENOUS DUPLEX LEFT  LOCATION: [facility]  DATE: 7/6/2023    INDICATION: chest pain + LLE pain, r o DVT. RF   current tobacco abuse, DMII  COMPARISON: None.  TECHNIQUE: Venous Duplex ultrasound of the left lower extremity with and without compression, augmentation and duplex. Color flow and spectral Doppler with waveform analysis performed.    FINDINGS: Exam includes the common femoral, femoral, popliteal, and contralateral common femoral veins as well as segmentally visualized deep calf veins and greater saphenous vein.     LEFT: No deep vein thrombosis. No superficial thrombophlebitis. No popliteal cyst.      Impression    IMPRESSION:  1.  No deep venous thrombosis in the left lower extremity.   Cardiac Catheterization    Narrative       Prox LAD lesion is 15% stenosed.     Mid LAD-1 lesion is 35% stenosed.     Mid LAD-2 lesion is 40% stenosed.     1st Diag lesion is 95% stenosed.     Dist LAD lesion is 60% stenosed.     Prox Cx lesion is 60% stenosed.     Mid Cx lesion is 70% stenosed.     Dist Cx lesion is 100% stenosed.     2nd Mrg lesion is 65% stenosed.     Prox RCA lesion is 40% stenosed.     Mid RCA-1 lesion is 55% stenosed.     Mid RCA-2 lesion is 65% stenosed.     Dist RCA lesion is 30% stenosed.     RPDA lesion is 65% stenosed.     2nd RPL lesion is 90% stenosed.    Mod to severe diffuse 3 vessel CAD-- Severe disease in a small Lcx system   with  of distal vessel. Diffuse RCA disease with severe lesion in PL   but a small system  Mild/mod diffuse Lad disease  Successful PCI of Diagonal vessel likely culprit vessel treated with 2.5mm   LULY stent

## 2023-07-07 NOTE — PROGRESS NOTES
A&O x4. VSS on room air. Tele SR. Denies CP/SOB/pain. Up independently. Heparin infusing at 1300 units, recheck 1300. Plan for angiogram and ECHO tomorrow.

## 2023-07-08 ENCOUNTER — APPOINTMENT (OUTPATIENT)
Dept: PHYSICAL THERAPY | Facility: CLINIC | Age: 58
End: 2023-07-08
Attending: INTERNAL MEDICINE
Payer: MEDICAID

## 2023-07-08 VITALS
WEIGHT: 205.1 LBS | HEART RATE: 84 BPM | OXYGEN SATURATION: 96 % | BODY MASS INDEX: 29.36 KG/M2 | SYSTOLIC BLOOD PRESSURE: 124 MMHG | TEMPERATURE: 98 F | HEIGHT: 70 IN | RESPIRATION RATE: 18 BRPM | DIASTOLIC BLOOD PRESSURE: 86 MMHG

## 2023-07-08 LAB
ANION GAP SERPL CALCULATED.3IONS-SCNC: 11 MMOL/L (ref 7–15)
BUN SERPL-MCNC: 12.9 MG/DL (ref 6–20)
CALCIUM SERPL-MCNC: 9.3 MG/DL (ref 8.6–10)
CHLORIDE SERPL-SCNC: 103 MMOL/L (ref 98–107)
CREAT SERPL-MCNC: 0.64 MG/DL (ref 0.67–1.17)
DEPRECATED HCO3 PLAS-SCNC: 22 MMOL/L (ref 22–29)
GFR SERPL CREATININE-BSD FRML MDRD: >90 ML/MIN/1.73M2
GLUCOSE BLDC GLUCOMTR-MCNC: 185 MG/DL (ref 70–99)
GLUCOSE BLDC GLUCOMTR-MCNC: 205 MG/DL (ref 70–99)
GLUCOSE BLDC GLUCOMTR-MCNC: 233 MG/DL (ref 70–99)
GLUCOSE SERPL-MCNC: 211 MG/DL (ref 70–99)
POTASSIUM SERPL-SCNC: 4.1 MMOL/L (ref 3.4–5.3)
SODIUM SERPL-SCNC: 136 MMOL/L (ref 136–145)

## 2023-07-08 PROCEDURE — 99232 SBSQ HOSP IP/OBS MODERATE 35: CPT | Performed by: INTERNAL MEDICINE

## 2023-07-08 PROCEDURE — 97530 THERAPEUTIC ACTIVITIES: CPT | Mod: GP

## 2023-07-08 PROCEDURE — 93010 ELECTROCARDIOGRAM REPORT: CPT | Performed by: INTERNAL MEDICINE

## 2023-07-08 PROCEDURE — 250N000013 HC RX MED GY IP 250 OP 250 PS 637: Performed by: INTERNAL MEDICINE

## 2023-07-08 PROCEDURE — 97110 THERAPEUTIC EXERCISES: CPT | Mod: GP

## 2023-07-08 PROCEDURE — 97161 PT EVAL LOW COMPLEX 20 MIN: CPT | Mod: GP

## 2023-07-08 PROCEDURE — 250N000013 HC RX MED GY IP 250 OP 250 PS 637: Performed by: NURSE PRACTITIONER

## 2023-07-08 PROCEDURE — 99239 HOSP IP/OBS DSCHRG MGMT >30: CPT | Performed by: INTERNAL MEDICINE

## 2023-07-08 PROCEDURE — 93005 ELECTROCARDIOGRAM TRACING: CPT

## 2023-07-08 PROCEDURE — 36415 COLL VENOUS BLD VENIPUNCTURE: CPT | Performed by: INTERNAL MEDICINE

## 2023-07-08 PROCEDURE — 80048 BASIC METABOLIC PNL TOTAL CA: CPT | Performed by: INTERNAL MEDICINE

## 2023-07-08 RX ORDER — ATORVASTATIN CALCIUM 40 MG/1
40 TABLET, FILM COATED ORAL DAILY
Qty: 90 TABLET | Refills: 3 | Status: SHIPPED | OUTPATIENT
Start: 2023-07-08 | End: 2023-09-12

## 2023-07-08 RX ORDER — FLASH GLUCOSE SENSOR
KIT MISCELLANEOUS
Qty: 2 EACH | Refills: 5 | Status: SHIPPED | OUTPATIENT
Start: 2023-07-08

## 2023-07-08 RX ORDER — METOPROLOL TARTRATE 25 MG/1
25 TABLET, FILM COATED ORAL 2 TIMES DAILY
Qty: 120 TABLET | Refills: 0 | Status: SHIPPED | OUTPATIENT
Start: 2023-07-08 | End: 2023-09-12

## 2023-07-08 RX ORDER — LISINOPRIL 5 MG/1
5 TABLET ORAL DAILY
Qty: 60 TABLET | Refills: 0 | Status: SHIPPED | OUTPATIENT
Start: 2023-07-08 | End: 2023-09-12

## 2023-07-08 RX ORDER — OMEPRAZOLE 20 MG/1
20 TABLET, DELAYED RELEASE ORAL DAILY
Qty: 30 TABLET | Refills: 0 | Status: SHIPPED | OUTPATIENT
Start: 2023-07-08 | End: 2023-07-14

## 2023-07-08 RX ORDER — GLIPIZIDE 5 MG/1
10 TABLET ORAL
Qty: 240 TABLET | Refills: 3 | Status: SHIPPED | OUTPATIENT
Start: 2023-07-08 | End: 2024-06-10

## 2023-07-08 RX ORDER — NITROGLYCERIN 0.4 MG/1
TABLET SUBLINGUAL
Qty: 25 TABLET | Refills: 3 | Status: SHIPPED | OUTPATIENT
Start: 2023-07-08

## 2023-07-08 RX ORDER — GLIPIZIDE 5 MG/1
10 TABLET ORAL
Qty: 240 TABLET | Refills: 3 | Status: SHIPPED | OUTPATIENT
Start: 2023-07-08 | End: 2023-07-08

## 2023-07-08 RX ORDER — PRASUGREL 10 MG/1
10 TABLET, FILM COATED ORAL DAILY
Qty: 90 TABLET | Refills: 0 | Status: SHIPPED | OUTPATIENT
Start: 2023-07-08 | End: 2023-09-12

## 2023-07-08 RX ORDER — ASPIRIN 81 MG/1
81 TABLET ORAL DAILY
Qty: 90 TABLET | Refills: 1 | Status: SHIPPED | OUTPATIENT
Start: 2023-07-08 | End: 2023-09-12

## 2023-07-08 RX ADMIN — ACETAMINOPHEN 650 MG: 325 TABLET, FILM COATED ORAL at 05:43

## 2023-07-08 RX ADMIN — NICOTINE 1 PATCH: 21 PATCH, EXTENDED RELEASE TRANSDERMAL at 09:00

## 2023-07-08 RX ADMIN — LISINOPRIL 5 MG: 5 TABLET ORAL at 08:09

## 2023-07-08 RX ADMIN — METOPROLOL TARTRATE 25 MG: 25 TABLET, FILM COATED ORAL at 08:09

## 2023-07-08 RX ADMIN — ASPIRIN 81 MG: 81 TABLET, COATED ORAL at 08:09

## 2023-07-08 RX ADMIN — PRASUGREL 10 MG: 10 TABLET, FILM COATED ORAL at 08:09

## 2023-07-08 ASSESSMENT — ACTIVITIES OF DAILY LIVING (ADL)
ADLS_ACUITY_SCORE: 34

## 2023-07-08 NOTE — PLAN OF CARE
Physical Therapy Discharge Summary    Reason for therapy discharge:    All goals and outcomes met, no further needs identified.    Progress towards therapy goal(s). See goals on Care Plan in [facility] electronic health record for goal details.  Goals met    Therapy recommendation(s):    Continued therapy is recommended.  Rationale/Recommendations:  OP CR phase II.

## 2023-07-08 NOTE — PLAN OF CARE
Goal Outcome Evaluation: Pt is A&Ox4, denies pain, VSS and on tele SR, on RA and LS clear, R groin site C/D/I, good CMS and pulses, up with SBA.  at dinner and 185 at HS. Plan for discharge home tomorrow.      Plan of Care Reviewed With: patient, spouse    Overall Patient Progress: improvingOverall Patient Progress: improving

## 2023-07-08 NOTE — DISCHARGE SUMMARY
[facility]    Discharge Summary  Hospitalist    Date of Admission:  7/6/2023  Date of Discharge:  7/8/2023  Discharging Provider: Angel Monteiro MD, MD    Discharge Diagnoses   NSTEMI status post PCI to [address] [place] [address] 7/7/2023  Poorly controlled DM-2.   Tobacco use      History of Present Illness   Tim Gaona is a 58 year old male who has a PMHx of DMII, tobacco use disorder (current), dyslipidemia, obesity who presents to OSH for evaluation of chest pain.      Patient initially developed exertional chest pain while doing light housework on 7/5 evening.  He took several aspirin and noted the pain improved.  He was able to rest a bit, however the pain returned with increased intensity on 7/6 AM.  Given the increased intensity and concurrent nausea, waxing and waning he presented to the emergency department for evaluation.  Patient denies any other symptoms including dyspnea, vomiting. Pt states he has felt otherwise at his baseline prior to this chest pain episode. No previous CP episodes.     Work-up in the emergency department shows BMP with sodium 135, normal creatinine elevated glucose 277.  Initial troponin 86, 113-->133.  CBC was grossly unremarkable.  Chest x-ray shows no acute findings, incidental findings noted above.  Patient was thought to be having an NSTEMI, and was transferred to higher level of care for probable angiography.  Patient is pain-free without nitroglycerin infusion, initiated  on heparin infusion.     I evaluated the patient shortly after transfer with his wife at the bedside. He is calm, pain free and appears in no distress. He denies any current nausea/vomiting, dizziness, palpitations or new symptoms.  Patient notes he has not any recent acute illnesses, respiratory symptoms, cough, fever, chills, urinary issues.             Hospital Course   Tim Gaona was admitted on 7/6/2023.  The following problems were addressed during his  hospitalization:    Principal Problem:    Chest pain  Active Problems:    NSTEMI (non-ST elevated myocardial infarction) (H)  Date of Admission:  7/6/2023        Assessment & Plan  Tim Gaona is a 58 year old male admitted on 7/6/2023. He has a PMHx of DMII, tobacco use disorder (current), dyslipidemia, obesity who presents to OSH for evaluation of chest pain.      NSTEMI   Exertional chest pain, waxing and waning in intensity. Initial troponin elevated 86--> 117-->133. ECG shows NSR w/o any ST/TW changes consistent w/ ischemia. CXR with no acute findings.   *Given  mg in ED, initiated on heparin infusion  - continue Heparin infusion  - start nitroglycerin infusion for any acute chest pain  - pt notes left calf pain, some intermittent left lateral thigh pain - LLE ultrasound to rule out DVT negative      -Nl hemodynamics  Nl cardiac exam  -pt started on bber, karmen dose statin  -seen by cardiology. PCI recommended.   -angiogram/PCI showed:   1. Mod to severe diffuse 3 vessel CAD-- Severe disease in a small Lcx system with  of distal vessel. Diffuse RCA disease with severe lesion in PL but a small system  2. Mild/mod diffuse Lad disease  3. Successful PCI of Diagonal vessel likely culprit vessel treated with 2.5mm LULY stent    Echo:   There is a small region of distal anterior and apical hypokinesis.  Left ventricular systolic function is mildly reduced.  The visual ejection fraction is 50-55%.  There is mild concentric left ventricular hypertrophy.  There is no comparison study available.   pt did well post angiogram  Catheter site looks fine. No pain  No post procedure cp or sob.   No new issues.      Plan at discharge  -cardiology follow up  - asa, effient per cards, asa lifelong. effient X 1 year  - lipitor 40mg at bedtime. Lipid panel 6 weeks  - cardiac rehab  - lisinopril 5mg every day, bmp 1 week with pcp  -Metoprolol 25mg bid  -pcp follow up  - smoking cessation  - negative JASMIN questions. Follow  up with pcp  -improve glycemic control         Diabetes mellitus, type II, uncontrolled   [PTA metformin, glipizide]  HgbA1c 9.9%.   -prior HbA1C 6% range.  - Has had very poor diet last few months- a lot of carbs  -he has not been taking his medications about 1/3 to 1/2 of time  - pt met with nutritionist  - pt feels motivated to improve diet and adhere to medication regimen  Plan at discharge-  -pcp follow up   - dm diet, per nutritionist  - hbA1C per pcp  - follow BS bid to tid for now  - tomorrow restart PTA metformin   -restart PtA glipizide- increase dose from 10mg am, 5mg pm to 10mg bid.   -pt has dm glucometer supplies at home  - start QID glucose monitoring w/ med sliding scale insulin  Close pcp follow up  -has glucometer and supplies at home.         Dyslipidemia  - start atorvastatin 10 mg PO daily on 7/6  Increased to 40mg every day, lipid panel now and 6 weeks     Tobacco use disorder  Currently smoking about 0.75 packs/day for last 32 years.   - Nicotine replacement ordered (patch)  -discuss smoking cessation and tx options with pt, close pcp follow up   Nicotine replacement currently     Hyponatremia, mild-resolved.   Sodium 135 on admission to the ED, patient is asymptomatic.  -BMP normalized.      Incidental findings     Probable calcified granuloma  CXR in ED shows possible calcified granuloma in the right lung base, tiny nodular opacities in the peripheral right lung.  -Recommended to have follow-up chest x-ray in 3-6 weeks   -Findings and recommendations for follow-up discussed with patient           Diet:  Mod Carb diet   DVT Prophylaxis: Heparin infusion  [place] Catheter: Not present  Lines: None     Cardiac Monitoring: None  Code Status:   Full Code         Clinically Significant Risk Factors Present on Admission []?Expand by Default                       # Obesity: Estimated body mass index is 30.51 kg/m  as calculated from the following:    Height as of an earlier encounter on 7/6/23: 1.765  "m (5' 9.5\").    Weight as of an earlier encounter on 7/6/23: 95.1 kg (209 lb 9.6 oz).                   Disposition Plan- discharge home.     Pcp, cardiac rehab, cardiology followup       Discussed care plan in detail with pt and rn day of discharge    Angel Monteiro MD, MD    Significant Results and Procedures   See hospital course    Pending Results     Unresulted Labs Ordered in the Past 30 Days of this Admission     No orders found from 6/6/2023 to 7/7/2023.          Code Status   Full Code       Primary Care Physician   Ricardo Cervantes    Physical Exam   Temp: 98  F (36.7  C) Temp src: Oral BP: 124/86 Pulse: 84   Resp: 18 SpO2: 95 % O2 Device: None (Room air)    Vitals:    07/06/23 1447 07/07/23 0632 07/08/23 0539   Weight: 94.1 kg (207 lb 6.4 oz) 93.4 kg (205 lb 12.8 oz) 93 kg (205 lb 1.6 oz)     Vital Signs with Ranges  Temp:  [97.9  F (36.6  C)-98  F (36.7  C)] 98  F (36.7  C)  Pulse:  [59-84] 84  Resp:  [16-18] 18  BP: ()/(60-87) 124/86  SpO2:  [94 %-100 %] 95 %  I/O last 3 completed shifts:  In: 733 [P.O.:540; I.V.:193]  Out: 800 [Urine:800]  Constitutional: In bed, nad, appears comfortable  Respiratory:     CTAB  Cardiovascular: RRR no r/g/m  R groing cath site looks fine.   GI: soft, nt, nd  Skin/Integumen: no rash or edema  Neuro; nl speech and mentation  Psych: nl affect      Discharge Disposition   Discharged to home  Condition at discharge: Good    Consultations This Hospital Stay   CARDIOLOGY IP CONSULT  PHARMACY IP CONSULT  NUTRITION SERVICES ADULT IP CONSULT  CARE MANAGEMENT / SOCIAL WORK IP CONSULT  HOSPITALIST IP CONSULT  NUTRITION SERVICES ADULT IP CONSULT  CARDIAC REHAB IP CONSULT  PHARMACY IP CONSULT  CARE MANAGEMENT / SOCIAL WORK IP CONSULT  SMOKING CESSATION PROGRAM IP CONSULT    Time Spent on this Encounter   I, Angel Monteiro MD, personally saw the patient today and spent greater than 30 minutes discharging this patient.    Discharge Orders      CARDIAC REHAB REFERRAL    "   Activity    Your activity upon discharge: activity as tolerated     Reason for your hospital stay    -Non ST elevation heart attach: status post PCI/percutaneous intervention with coronary artery stent placement to 1st diagonal 7/7/2023  -poorly controlled Type II diabetes  - tobacco use     Patient care order    1. Ask wife about snoring, daytime sleepiness and apneas at night  2. Recommend OMRON upper arm blood pressure cuff to check blood pressure several times per week: when checking, sit for 5 minutes, arm at heart level, check 3 in a row about 1 minute between checks, throw out first reading as can be less accurate, record 2nd and 3 rd readings. Check at least 2 hours after morning medications. Bring in values to doctor. Can buy at [employer] or [facility]  3. Strongly recommend complete smoking cessation  4. Daily exercise  5. Consider chantix or other medications for smoking cessation in addition to nicotine replacement. Counseler can be beneficial as well.   6. Check blood sugars 2-3 times per day before meals, call doctor if consistently above 200.   7. I increased dose of Glipizide/glucotrol to 10mg twice daily, (two 5mg tablets twice daily)-restart at evening meal tonight  Restart metformin tomorrow am.     Follow-up and recommended labs and tests     1. Follow up with primary care provider or partner 1 week with BMP lab test. They will need to order fasting Lipid panel in 6 weeks and repeat diabetes test (HbA1C) in 2-3 months  2. Follow up with cardiac rehab team  3. Follow up with [facility] as arranged, they will contact you, call them if not hear from them in 1-2 weeks  4. Chest xray showed nodular opacities in peripheral right lung this absolutely needs to be followed up with repeat chest xray or CT scan of chest in 3-4 weeks. Primary doctor needs to arrange. Discuss next visit     Full Code     Diet    Follow this diet upon discharge: Orders Placed This Encounter      Low  Saturated Fat Na <2400 mg  Diabetic diet     Discharge Medications   Discharge Medication List as of 7/8/2023 10:04 AM      START taking these medications    Details   Continuous Blood Gluc Sensor (FREESTYLE CHAIM 14 DAY SENSOR) MISC Change every 14 days., Disp-2 each, R-5, E-PrescribeFuture refills by PCP Dr. Ricardo Cervantes with phone number [phone].      lisinopril (ZESTRIL) 5 MG tablet Take 1 tablet (5 mg) by mouth daily, Disp-60 tablet, R-0, E-PrescribeFuture refills by PCP Dr. Ricardo Cervantes with phone number [phone].      metoprolol tartrate (LOPRESSOR) 25 MG tablet Take 1 tablet (25 mg) by mouth 2 times daily, Disp-120 tablet, R-0, E-PrescribeFuture refills by PCP Dr. Ricardo Cervantes with phone number [phone].      omeprazole (PRILOSEC OTC) 20 MG EC tablet Take 1 tablet (20 mg) by mouth daily, Disp-30 tablet, R-0, E-PrescribeFuture refills by PCP Dr. Ricardo Cervantes with phone number [phone].      prasugrel (EFFIENT) 10 MG TABS tablet Take 1 tablet (10 mg) by mouth daily, Disp-90 tablet, R-0, E-PrescribeFuture refills by PCP Dr. Ricardo Cervantes with phone number [phone].         CONTINUE these medications which have CHANGED    Details   aspirin 81 MG EC tablet Take 1 tablet (81 mg) by mouth daily Start tomorrow., Disp-90 tablet, R-1, E-PrescribeFuture refills by PCP Dr. Ricardo Cervantes with phone number [phone].      atorvastatin (LIPITOR) 40 MG tablet Take 1 tablet (40 mg) by mouth daily, Disp-90 tablet, R-3, E-PrescribeFuture refills by PCP Dr. Ricardo Cervantes with phone number [phone].      glipiZIDE (GLUCOTROL) 5 MG tablet Take 2 tablets (10 mg) by mouth 2 times daily (before meals) TAKE TWO TABLETS BY MOUTH EVERY MORNING AND TAKE ONE TABLET BY MOUTH IN THE EVENING, Disp-240 tablet, R-3, E-Prescribe      nicotine (NICOTROL) 10 MG inhaler Inhale 1 cartridge as needed for smoking cessation. Maximum of 16 cartridges/day., Disp-6 each, R-0, E-PrescribeFuture refills  "by PCP Dr. Ricardo Cervantes with phone number [phone].      nitroGLYcerin (NITROSTAT) 0.4 MG sublingual tablet One tablet under the tongue every 5 minutes if needed for chest pain. May repeat every 5 minutes for a maximum of 3 doses in 15 minutes\", Disp-25 tablet, R-3, E-PrescribeFuture refills by PCP Dr. Ricardo Cervantes with phone number [phone].         CONTINUE these medications which have NOT CHANGED    Details   Ascorbic Acid (VITAMIN C) 500 MG CAPS Take 1 capsule by mouth daily, Historical      B Complex Vitamins (VITAMIN B COMPLEX PO) Take 1 tablet by mouth daily, Historical      metFORMIN (GLUCOPHAGE) 1000 MG tablet Take 1 tablet (1,000 mg) by mouth 2 times daily (with meals), Disp-360 tablet, R-3, E-Prescribe      VITAMIN E PO Take 1 tablet by mouth daily, Historical      ACE/ARB/ARNI NOT PRESCRIBED, INTENTIONAL, Reason ACE/ARB was Not Prescribed: Refusal by patient      blood glucose (NO BRAND SPECIFIED) test strip Use to test blood sugars one time daily or as directed, Disp-100 strip,R-11, E-Prescribe      blood glucose monitoring (ACCU-CHEK MULTICLIX) lancets Use to test blood sugar one time daily or as directed.Disp-100 each,[id number]      blood glucose monitoring (NO BRAND SPECIFIED) meter device kit Use to test blood sugar 2 times daily or as directed.Disp-1 kit, [id number] insurance coverage      nicotine (NICODERM CQ) 21 MG/24HR 24 hr patch Place 1 patch onto the skin every 24 hours, Disp-30 patch,R-1, E-Prescribe         STOP taking these medications       ibuprofen (ADVIL/MOTRIN) 200 MG tablet Comments:   Reason for Stopping:             Allergies   Allergies   Allergen Reactions     Aleve [Naproxen] Itching     Tolerates ibuprofen     Data   Most Recent 3 CBC's:Recent Labs   Lab Test 07/06/23  0954 11/18/17  2348 11/09/15  0805   WBC 7.8 12.7* 7.1   HGB 15.0 14.0 14.6   MCV 90 92 91    170 155      Most Recent 3 BMP's:  Recent Labs   Lab Test 07/08/23  0754 " 07/08/23  0537 07/08/23  0200 07/07/23  1134 07/07/23  0741 07/06/23  1553 07/06/23  0954   NA  --  136  --   --  136  --  135*   POTASSIUM  --  4.1  --   --  4.1  --  4.3   CHLORIDE  --  103  --   --  101  --  100   CO2  --  22  --   --  23  --  24   BUN  --  12.9  --   --  12.5  --  11.8   CR  --  0.64*  --   --  0.66*  --  0.64*   ANIONGAP  --  11  --   --  12  --  11   JENNA  --  9.3  --   --  9.0  --  9.2   * 211* 205*   < > 282*   < > 277*    < > = values in this interval not displayed.     Most Recent 2 LFT's:  Recent Labs   Lab Test 11/18/17  2348 09/20/17  1102   AST 10 9   ALT 20 23   ALKPHOS 75 74   BILITOTAL 0.5 0.4     Most Recent INR's and Anticoagulation Dosing History:  Anticoagulation Dose History         No data to display              Most Recent 3 Troponin's:No lab results found.  Most Recent Cholesterol Panel:  Recent Labs   Lab Test 07/07/23  0741   CHOL 177   LDL 89   HDL 27*   TRIG 304*     Most Recent 6 Bacteria Isolates From Any Culture (See EPIC Reports for Culture Details):  Recent Labs   Lab Test 09/18/18  1042 11/09/15  0759   CULT No beta hemolytic Streptococcus Group A isolated Positive presumptive for Group A Beta Streptococcus*     Most Recent TSH, T4 and A1c Labs:  Recent Labs   Lab Test 07/06/23  1517 09/03/20  1508 10/30/19  1007   TSH  --   --  1.97   A1C 9.9*   < > 7.8*    < > = values in this interval not displayed.     Results for orders placed or performed during the hospital encounter of 07/06/23   US Lower Extremity Venous Duplex Left    Narrative    EXAM: US LOWER EXTREMITY VENOUS DUPLEX LEFT  LOCATION: [facility]  DATE: 7/6/2023    INDICATION: chest pain + LLE pain, r o DVT. RF   current tobacco abuse, DMII  COMPARISON: None.  TECHNIQUE: Venous Duplex ultrasound of the left lower extremity with and without compression, augmentation and duplex. Color flow and spectral Doppler with waveform analysis performed.    FINDINGS: Exam includes the  common femoral, femoral, popliteal, and contralateral common femoral veins as well as segmentally visualized deep calf veins and greater saphenous vein.     LEFT: No deep vein thrombosis. No superficial thrombophlebitis. No popliteal cyst.      Impression    IMPRESSION:  1.  No deep venous thrombosis in the left lower extremity.   Echocardiogram Complete     Value    LVEF  50-55%    [facility]    [id number]  [id number]  [id number]  [id number]^TRACEY^RENUKA^ABILIO     [facility]  Echocardiography Laboratory  [address] [place] [address]     Name: ZEUS RUIZ  MRN: [id number]  : 1965  Study Date: 2023 11:34 AM  Age: 58 yrs  Gender: Male  Patient Location: [facility]  Reason For Study: Chest Pain, Chest Pressure, Chest Tightness  Ordering Physician: RENUKA WEBB  Referring Physician: Ricardo Cervantes  Performed By: Luigi Lees RDCS     BSA: 2.1 m2  Height: 70 in  Weight: 205 lb  HR: 66  BP: 104/60 mmHg  ______________________________________________________________________________  Procedure  Complete Portable Echo Adult. Optison (NDC #6103-7869) given intravenously.  ______________________________________________________________________________  Interpretation Summary     There is a small region of distal anterior and apical hypokinesis.  Left ventricular systolic function is mildly reduced.  The visual ejection fraction is 50-55%.  There is mild concentric left ventricular hypertrophy.  There is no comparison study available.  ______________________________________________________________________________  Left Ventricle  The left ventricle is normal in size. There is mild concentric left  ventricular hypertrophy. Left ventricular diastolic function is normal.  Diastolic Doppler findings (E/E' ratio and/or other parameters) suggest left  ventricular filling pressures are indeterminate. Left ventricular systolic  function is mildly reduced. The visual ejection fraction is 50-55%.  There is a  small region of distal anterior and apical hypokinesis.     Right Ventricle  The right ventricle is normal in structure, function and size.     Atria  Normal left atrial size. Right atrial size is normal. There is no atrial shunt  seen.     Mitral Valve  The mitral valve is normal in structure and function. There is trace mitral  regurgitation.     Tricuspid Valve  The tricuspid valve is normal in structure and function. There is trace  tricuspid regurgitation. Right ventricular systolic pressure could not be  approximated due to inadequate tricuspid regurgitation. IVC diameter <2.1 cm  collapsing >50% with sniff suggests a normal RA pressure of 3 mmHg.     Aortic Valve  The aortic valve is normal in structure and function. No aortic regurgitation  is present. No aortic stenosis is present.     Pulmonic Valve  The pulmonic valve is not well seen, but is grossly normal. There is trace  pulmonic valvular regurgitation.     Vessels  Normal size aorta. The inferior vena cava was normal in size with preserved  respiratory variability.     Pericardium  There is no pericardial effusion.     Rhythm  Sinus rhythm was noted.  ______________________________________________________________________________  MMode/2D Measurements & Calculations  IVSd: 1.3 cm     LVIDd: 4.7 cm  LVIDs: 4.1 cm  LVPWd: 1.3 cm  FS: 11.8 %  LV mass(C)d: 233.3 grams  LV mass(C)dI: 110.6 grams/m2  Ao root diam: 3.6 cm  LA dimension: 3.9 cm  asc Aorta Diam: 3.4 cm  LA/Ao: 1.1  LA Volume (BP): 42.4 ml  LA Volume Index (BP): 20.1 ml/m2  RV Base: 3.4 cm  RWT: 0.55     TAPSE: 2.0 cm     Doppler Measurements & Calculations  MV E max manny: 71.6 cm/sec  MV A max manny: 62.4 cm/sec  MV E/A: 1.1  MV dec slope: 294.9 cm/sec2  MV dec time: 0.24 sec  PA acc time: 0.12 sec  E/E' av.7  Lateral E/e': 8.7  Medial E/e': 8.8  RV S Manny: 11.0 cm/sec     ______________________________________________________________________________  Report approved by: Kennedy  Liliana Galan 07/07/2023 03:05 PM         Cardiac Catheterization    Narrative       Prox LAD lesion is 15% stenosed.     Mid LAD-1 lesion is 35% stenosed.     Mid LAD-2 lesion is 40% stenosed.     1st Diag lesion is 95% stenosed.     Dist LAD lesion is 60% stenosed.     Prox Cx lesion is 60% stenosed.     Mid Cx lesion is 70% stenosed.     Dist Cx lesion is 100% stenosed.     2nd Mrg lesion is 65% stenosed.     Prox RCA lesion is 40% stenosed.     Mid RCA-1 lesion is 55% stenosed.     Mid RCA-2 lesion is 65% stenosed.     Dist RCA lesion is 30% stenosed.     RPDA lesion is 65% stenosed.     2nd RPL lesion is 90% stenosed.    Mod to severe diffuse 3 vessel CAD-- Severe disease in a small Lcx system   with  of distal vessel. Diffuse RCA disease with severe lesion in PL   but a small system  Mild/mod diffuse Lad disease  Successful PCI of Diagonal vessel likely culprit vessel treated with 2.5mm   LULY stent

## 2023-07-08 NOTE — PROGRESS NOTES
[facility]    Cardiology Consultation - Progress Note     Date of Admission:  7/6/2023  Date of Service: 7/8/2023    Reason for Consult   Reason for consult: chest pain    History of Present Illness   Tim Gaona is a 58 year old male who was admitted on 7/6/2023 with NSTEMI (chest pain, trops 86->117->133->138). Medical comorbidities include type 2 diabetes mellitus, hyperlipidemia, obesity, and tobacco use.     He underwent coronary angiogram on 7/7 which showed moderate to severe diffuse 3 vessel coronary artery disease. The culprit lesion was felt to be the 1st diagonal which underwent PCI. The remainder of the disease will be medically managed. Echocardiogram showed LVEF of 50-55% with distal anterior and apical hypokinesis. There is mild concentric LVH.     I discussed the role of cardiac rehab, smoking cessation, improved diabetes control, and diet in prevention of further worsening of his coronary artery disease.    Assessment & Plan   #1 NSTEMI status post PCI to 1st diagonal 7/7/2023  #2 Type 2 diabetes mellitus, poorly controlled, A1c 9.9%  #3 Hyperlipidemia, mild  #4 Obesity  #5 Tobacco use    Plan:  1. DAPT: prasugrel 10 mg daily for 1 year, aspirin 81 mg daily for life  2. Lisinopril 5 mg daily, metoprolol tartrate 25 mg BID  3. Atorvastatin 40 mg daily  4. Outpatient cardiac rehab  5. Smoking cessation  6. Improved diabetes control  7. We will arrange cardiology follow-up  8. OK to discharge today from a cardiology perspective      Yamil Dominguez MD    Primary Care Physician   Ricardo Cervantes    Patient Active Problem List   Diagnosis     Hyperlipidemia LDL goal <100     Obesity, Class I, BMI 30-34.9     Tobacco use disorder     Type 2 diabetes mellitus with hyperglycemia, without long-term current use of insulin (H)     Chest pain     NSTEMI (non-ST elevated myocardial infarction) (H)       Past Medical History   I have reviewed this patient's medical history  and updated it with pertinent information if needed.   Past Medical History:   Diagnosis Date     Diabetes mellitus, type 2 (H)      Obesity, Class I, BMI 30-34.9 10/26/2015       Past Surgical History   I have reviewed this patient's surgical history and updated it with pertinent information if needed.  Past Surgical History:   Procedure Laterality Date     CV CORONARY ANGIOGRAM N/A 7/7/2023    Procedure: Coronary Angiogram;  Surgeon: Dionisio Seals MD;  Location:  HEART CARDIAC CATH LAB     CV PCI N/A 7/7/2023    Procedure: Percutaneous Coronary Intervention;  Surgeon: Dionisio Seals MD;  Location: [facility] CARDIAC CATH LAB     HC REMOVE TONSILS/ADENOIDS,<13 Y/O  1971    T & A <12y.o.     LAPAROSCOPIC CHOLECYSTECTOMY N/A 11/19/2017    Procedure: LAPAROSCOPIC CHOLECYSTECTOMY;  Laparoscopic Cholecystectomy;  Surgeon: Farhana Cedillo MD;  Location:  OR       Prior to Admission Medications   Prior to Admission Medications   Prescriptions Last Dose Informant Patient Reported? Taking?   ACE/ARB/ARNI NOT PRESCRIBED, INTENTIONAL,   No No   Sig: Please choose reason not prescribed, below   ASPIRIN PO 7/6/2023  Yes Yes   Sig: Take 81 mg by mouth daily   Ascorbic Acid (VITAMIN C) 500 MG CAPS   Yes Yes   Sig: Take 1 capsule by mouth daily   B Complex Vitamins (VITAMIN B COMPLEX PO)   Yes Yes   Sig: Take 1 tablet by mouth daily   VITAMIN E PO   Yes Yes   Sig: Take 1 tablet by mouth daily   blood glucose (NO BRAND SPECIFIED) test strip   No No   Sig: Use to test blood sugars one time daily or as directed   blood glucose monitoring (ACCU-CHEK MULTICLIX) lancets   No No   Sig: Use to test blood sugar one time daily or as directed.   blood glucose monitoring (NO BRAND SPECIFIED) meter device kit   No No   Sig: Use to test blood sugar 2 times daily or as directed.   glipiZIDE (GLUCOTROL) 5 MG tablet 7/5/2023  No Yes   Sig: TAKE TWO TABLETS BY MOUTH EVERY MORNING AND TAKE ONE TABLET BY MOUTH IN THE EVENING   ibuprofen  (ADVIL/MOTRIN) 200 MG tablet prn  Yes Yes   Sig: Take 400 mg by mouth every 8 hours as needed for mild pain   metFORMIN (GLUCOPHAGE) 1000 MG tablet 7/5/2023  No Yes   Sig: Take 1 tablet (1,000 mg) by mouth 2 times daily (with meals)   nicotine (NICODERM CQ) 21 MG/24HR 24 hr patch   No No   Sig: Place 1 patch onto the skin every 24 hours   Patient not taking: Reported on 8/2/2022   nicotine (NICOTROL) 10 MG inhaler   No No   Sig: Inhale 1 cartridge as needed for smoking cessation. Maximum of 16 cartridges/day.   Patient not taking: No sig reported      Facility-Administered Medications: None     Current Facility-Administered Medications   Medication Dose Route Frequency     aspirin  81 mg Oral Daily     atorvastatin  40 mg Oral QPM     insulin aspart  1-10 Units Subcutaneous TID AC     insulin aspart  1-7 Units Subcutaneous At Bedtime     insulin aspart   Subcutaneous TID w/meals     lisinopril  5 mg Oral Daily     metoprolol tartrate  25 mg Oral BID     nicotine  1 patch Transdermal Daily    And     nicotine   Transdermal Q8H MITCHEL     prasugrel  10 mg Oral Daily     sodium chloride (PF)  3 mL Intracatheter Q8H     Current Facility-Administered Medications   Medication Last Rate     - MEDICATION INSTRUCTIONS -       - MEDICATION INSTRUCTIONS -       Percutaneous Coronary Intervention orders placed (this is information for BPA alerting)       Allergies   Allergies   Allergen Reactions     Aleve [Naproxen] Itching     Tolerates ibuprofen       Social History    reports that he quit smoking about 2 years ago. His smoking use included cigarettes. He has a 24.00 pack-year smoking history. He quit smokeless tobacco use about 2 years ago. He reports that he does not drink alcohol and does not use drugs.    Family History   Family History   Problem Relation Age of Onset     Thyroid Disease Mother      Gallbladder Disease Mother      Heart Disease Father      Lipids Father      Thyroid Disease Paternal Grandmother       "Gallbladder Disease Paternal Grandmother      Thyroid Disease Paternal Grandfather      Gallbladder Disease Sister        Review of Systems   The comprehensive Review of Systems is negative other than noted in the HPI or here.     Physical Exam   Vital Signs with Ranges  Temp:  [97.8  F (36.6  C)-98  F (36.7  C)] 98  F (36.7  C)  Pulse:  [59-75] 70  Resp:  [16-18] 18  BP: ()/(60-87) 105/69  SpO2:  [94 %-100 %] 96 %  Wt Readings from Last 4 Encounters:   07/08/23 93 kg (205 lb 1.6 oz)   07/06/23 95.1 kg (209 lb 9.6 oz)   08/02/22 97.1 kg (214 lb)   03/19/21 100.1 kg (220 lb 9.6 oz)     I/O last 3 completed shifts:  In: 733 [P.O.:540; I.V.:193]  Out: 800 [Urine:800]      Vitals: /69 (BP Location: Left arm)   Pulse 70   Temp 98  F (36.7  C) (Oral)   Resp 18   Ht 1.778 m (5' 10\")   Wt 93 kg (205 lb 1.6 oz)   SpO2 96%   BMI 29.43 kg/m      General: Alert, oriented, in no acute distress  HEENT: PERRLA, mucous membranes moist  Neck: Normal JVP  CV: Regular rate and rhythm without murmur  Respiratory: Clear to auscultation bilaterally  GI: Normoactive bowel sounds; soft, non-tender abdomen  Neuro: No focal deficits appreciated  Extremities: No peripheral edema bilaterally    Recent Labs   Lab 07/08/23  0200 07/07/23  2107 07/07/23  1626 07/07/23  1134 07/07/23  0741 07/06/23  1553 07/06/23  0954   WBC  --   --   --   --   --   --  7.8   HGB  --   --   --   --   --   --  15.0   MCV  --   --   --   --   --   --  90   PLT  --   --   --   --   --   --  168   NA  --   --   --   --  136  --  135*   POTASSIUM  --   --   --   --  4.1  --  4.3   CHLORIDE  --   --   --   --  101  --  100   CO2  --   --   --   --  23  --  24   BUN  --   --   --   --  12.5  --  11.8   CR  --   --   --   --  0.66*  --  0.64*   GFRESTIMATED  --   --   --   --  >90  --  >90   ANIONGAP  --   --   --   --  12  --  11   JENNA  --   --   --   --  9.0  --  9.2   * 185* 213*   < > 282*   < > 277*    < > = values in this interval not " displayed.     Recent Labs   Lab Test 07/07/23  0741 03/19/21  0942   CHOL 177 164   HDL 27* 31*   LDL 89 115*   TRIG 304* 91     Recent Labs   Lab 07/06/23  0954   WBC 7.8   HGB 15.0   HCT 43.8   MCV 90        No results for input(s): PH, PHV, PO2, PO2V, SAT, PCO2, PCO2V, HCO3, HCO3V in the last 168 hours.  No results for input(s): NTBNPI, NTBNP in the last 168 hours.  No results for input(s): DD in the last 168 hours.  No results for input(s): SED, CRP in the last 168 hours.  Recent Labs   Lab 07/06/23  0954        No results for input(s): TSH in the last 168 hours.  No results for input(s): COLOR, APPEARANCE, URINEGLC, URINEBILI, URINEKETONE, SG, UBLD, URINEPH, PROTEIN, UROBILINOGEN, NITRITE, LEUKEST, RBCU, WBCU in the last 168 hours.    Imaging:  Recent Results (from the past 48 hour(s))   XR Chest 2 Views    Narrative    XR CHEST 2 VIEWS 7/6/2023 10:09 AM    HISTORY: Chest pain    COMPARISON: X-ray 5/18/2016      Impression    IMPRESSION: Probable calcified granuloma at the right lung base. Tiny  nodular opacities in the peripheral right lung, most likely prominent  vessel on end or small granulomas. As a precaution, recommend  attention on x-ray follow-up in 3-6 months. No acute findings. No  focal pneumonic consolidation or pleural effusion. Normal heart size.    CORINA RAMÍREZ MD         SYSTEM ID:  [id number]   US Lower Extremity Venous Duplex Left    Narrative    EXAM: US LOWER EXTREMITY VENOUS DUPLEX LEFT  LOCATION: [facility]  DATE: 7/6/2023    INDICATION: chest pain + LLE pain, r o DVT. RF   current tobacco abuse, DMII  COMPARISON: None.  TECHNIQUE: Venous Duplex ultrasound of the left lower extremity with and without compression, augmentation and duplex. Color flow and spectral Doppler with waveform analysis performed.    FINDINGS: Exam includes the common femoral, femoral, popliteal, and contralateral common femoral veins as well as segmentally visualized deep  calf veins and greater saphenous vein.     LEFT: No deep vein thrombosis. No superficial thrombophlebitis. No popliteal cyst.      Impression    IMPRESSION:  1.  No deep venous thrombosis in the left lower extremity.   Cardiac Catheterization    Narrative       Prox LAD lesion is 15% stenosed.     Mid LAD-1 lesion is 35% stenosed.     Mid LAD-2 lesion is 40% stenosed.     1st Diag lesion is 95% stenosed.     Dist LAD lesion is 60% stenosed.     Prox Cx lesion is 60% stenosed.     Mid Cx lesion is 70% stenosed.     Dist Cx lesion is 100% stenosed.     2nd Mrg lesion is 65% stenosed.     Prox RCA lesion is 40% stenosed.     Mid RCA-1 lesion is 55% stenosed.     Mid RCA-2 lesion is 65% stenosed.     Dist RCA lesion is 30% stenosed.     RPDA lesion is 65% stenosed.     2nd RPL lesion is 90% stenosed.    Mod to severe diffuse 3 vessel CAD-- Severe disease in a small Lcx system   with  of distal vessel. Diffuse RCA disease with severe lesion in PL   but a small system  Mild/mod diffuse Lad disease  Successful PCI of Diagonal vessel likely culprit vessel treated with 2.5mm   LULY stent     Echocardiogram Complete   Result Value    LVEF  50-55%    Narrative    [id number]  [id number]  [id number]  [id number]^TRACEY^RENUKA^ABILIO     [facility]  Echocardiography Laboratory  [address] [place] [address]     Name: ZEUS RUIZ  MRN: [id number]  : 1965  Study Date: 2023 11:34 AM  Age: 58 yrs  Gender: Male  Patient Location: [facility]  Reason For Study: Chest Pain, Chest Pressure, Chest Tightness  Ordering Physician: RENUKA WEBB  Referring Physician: Ricardo Cervantes  Performed By: Luigi Lees RDCS     BSA: 2.1 m2  Height: 70 in  Weight: 205 lb  HR: 66  BP: 104/60 mmHg  ______________________________________________________________________________  Procedure  Complete Portable Echo Adult. Optison (NDC #[id number]) given  intravenously.  ______________________________________________________________________________  Interpretation Summary     There is a small region of distal anterior and apical hypokinesis.  Left ventricular systolic function is mildly reduced.  The visual ejection fraction is 50-55%.  There is mild concentric left ventricular hypertrophy.  There is no comparison study available.  ______________________________________________________________________________  Left Ventricle  The left ventricle is normal in size. There is mild concentric left  ventricular hypertrophy. Left ventricular diastolic function is normal.  Diastolic Doppler findings (E/E' ratio and/or other parameters) suggest left  ventricular filling pressures are indeterminate. Left ventricular systolic  function is mildly reduced. The visual ejection fraction is 50-55%. There is a  small region of distal anterior and apical hypokinesis.     Right Ventricle  The right ventricle is normal in structure, function and size.     Atria  Normal left atrial size. Right atrial size is normal. There is no atrial shunt  seen.     Mitral Valve  The mitral valve is normal in structure and function. There is trace mitral  regurgitation.     Tricuspid Valve  The tricuspid valve is normal in structure and function. There is trace  tricuspid regurgitation. Right ventricular systolic pressure could not be  approximated due to inadequate tricuspid regurgitation. IVC diameter <2.1 cm  collapsing >50% with sniff suggests a normal RA pressure of 3 mmHg.     Aortic Valve  The aortic valve is normal in structure and function. No aortic regurgitation  is present. No aortic stenosis is present.     Pulmonic Valve  The pulmonic valve is not well seen, but is grossly normal. There is trace  pulmonic valvular regurgitation.     Vessels  Normal size aorta. The inferior vena cava was normal in size with preserved  respiratory variability.     Pericardium  There is no pericardial  effusion.     Rhythm  Sinus rhythm was noted.  ______________________________________________________________________________  MMode/2D Measurements & Calculations  IVSd: 1.3 cm     LVIDd: 4.7 cm  LVIDs: 4.1 cm  LVPWd: 1.3 cm  FS: 11.8 %  LV mass(C)d: 233.3 grams  LV mass(C)dI: 110.6 grams/m2  Ao root diam: 3.6 cm  LA dimension: 3.9 cm  asc Aorta Diam: 3.4 cm  LA/Ao: 1.1  LA Volume (BP): 42.4 ml  LA Volume Index (BP): 20.1 ml/m2  RV Base: 3.4 cm  RWT: 0.55     TAPSE: 2.0 cm     Doppler Measurements & Calculations  MV E max manny: 71.6 cm/sec  MV A max manny: 62.4 cm/sec  MV E/A: 1.1  MV dec slope: 294.9 cm/sec2  MV dec time: 0.24 sec  PA acc time: 0.12 sec  E/E' av.7  Lateral E/e': 8.7  Medial E/e': 8.8  RV S Manny: 11.0 cm/sec     ______________________________________________________________________________  Report approved by: Liliana Grande 2023 03:05 PM               Medical Decision Making       35 MINUTES SPENT BY ME on the date of service doing chart review, history, exam, documentation & further activities per the note.

## 2023-07-08 NOTE — PLAN OF CARE
Neuro- A/Ox4  Tele/Cardiac- NSR  Resp- on RA, LS clear  Activity- independent  Pain- mild back pain managed with PRN Tylenol  Drips- n/a  Drains/Tubes- PIV  Skin- R groin s/p angio CDI w/ minimal dried blood, CMS intact  GI/- WDL  Plan- likely discharge home today    Sunshine Lindsey RN

## 2023-07-08 NOTE — CONSULTS
Consult noted for scheduling PCP follow up.  Initial consult completed 7/7/23.  PCP appt already scheduled July 14th at 9:05am and on AVS.    Cassie Dai RN, BS  Care Coordinator  [email or web address]

## 2023-07-08 NOTE — PROGRESS NOTES
Pt denied pain. vitals stable. IV removed w/o s/s of infection. Reviewed/gave d/c instructions including med's, and F/u's. Pt verbalized understanding. Leaving unit with all belongings in wheelchair. New RX filled and given to pt. transport provided by pts wife.

## 2023-07-12 ENCOUNTER — HOSPITAL ENCOUNTER (OUTPATIENT)
Dept: CARDIAC REHAB | Facility: CLINIC | Age: 58
Discharge: HOME | End: 2023-07-12
Attending: INTERNAL MEDICINE | Admitting: INTERNAL MEDICINE
Payer: MEDICAID

## 2023-07-12 DIAGNOSIS — I21.4 NSTEMI (NON-ST ELEVATED MYOCARDIAL INFARCTION) (H): ICD-10-CM

## 2023-07-12 LAB
ATRIAL RATE - MUSE: 65 BPM
ATRIAL RATE - MUSE: 67 BPM
ATRIAL RATE - MUSE: 67 BPM
DIASTOLIC BLOOD PRESSURE - MUSE: NORMAL MMHG
INTERPRETATION ECG - MUSE: NORMAL
P AXIS - MUSE: 26 DEGREES
P AXIS - MUSE: 57 DEGREES
P AXIS - MUSE: 65 DEGREES
PR INTERVAL - MUSE: 140 MS
QRS DURATION - MUSE: 86 MS
QRS DURATION - MUSE: 92 MS
QRS DURATION - MUSE: 94 MS
QT - MUSE: 416 MS
QT - MUSE: 426 MS
QT - MUSE: 432 MS
QTC - MUSE: 439 MS
QTC - MUSE: 449 MS
QTC - MUSE: 450 MS
R AXIS - MUSE: 50 DEGREES
R AXIS - MUSE: 57 DEGREES
R AXIS - MUSE: 61 DEGREES
SYSTOLIC BLOOD PRESSURE - MUSE: NORMAL MMHG
T AXIS - MUSE: 105 DEGREES
T AXIS - MUSE: 111 DEGREES
T AXIS - MUSE: 98 DEGREES
VENTRICULAR RATE- MUSE: 65 BPM
VENTRICULAR RATE- MUSE: 67 BPM
VENTRICULAR RATE- MUSE: 67 BPM

## 2023-07-12 PROCEDURE — 93798 PHYS/QHP OP CAR RHAB W/ECG: CPT

## 2023-07-12 PROCEDURE — 93797 PHYS/QHP OP CAR RHAB WO ECG: CPT

## 2023-07-14 ENCOUNTER — OFFICE VISIT (OUTPATIENT)
Dept: FAMILY MEDICINE | Facility: CLINIC | Age: 58
End: 2023-07-14
Payer: MEDICAID

## 2023-07-14 VITALS
OXYGEN SATURATION: 100 % | RESPIRATION RATE: 20 BRPM | DIASTOLIC BLOOD PRESSURE: 78 MMHG | HEIGHT: 70 IN | BODY MASS INDEX: 29.69 KG/M2 | WEIGHT: 207.38 LBS | HEART RATE: 78 BPM | SYSTOLIC BLOOD PRESSURE: 122 MMHG | TEMPERATURE: 97.1 F

## 2023-07-14 DIAGNOSIS — F17.200 TOBACCO USE DISORDER: ICD-10-CM

## 2023-07-14 DIAGNOSIS — I21.4 NSTEMI (NON-ST ELEVATED MYOCARDIAL INFARCTION) (H): Primary | ICD-10-CM

## 2023-07-14 DIAGNOSIS — E11.65 TYPE 2 DIABETES MELLITUS WITH HYPERGLYCEMIA, WITHOUT LONG-TERM CURRENT USE OF INSULIN (H): ICD-10-CM

## 2023-07-14 LAB
CREAT UR-MCNC: 130.6 MG/DL
MICROALBUMIN UR-MCNC: <12 MG/L
MICROALBUMIN/CREAT UR: NORMAL MG/G{CREAT}

## 2023-07-14 PROCEDURE — 82043 UR ALBUMIN QUANTITATIVE: CPT | Performed by: FAMILY MEDICINE

## 2023-07-14 PROCEDURE — 99214 OFFICE O/P EST MOD 30 MIN: CPT | Performed by: FAMILY MEDICINE

## 2023-07-14 PROCEDURE — 82570 ASSAY OF URINE CREATININE: CPT | Performed by: FAMILY MEDICINE

## 2023-07-14 PROCEDURE — 99207 PR FOOT EXAM NO CHARGE: CPT | Performed by: FAMILY MEDICINE

## 2023-07-14 RX ORDER — NICOTINE 21 MG/24HR
1 PATCH, TRANSDERMAL 24 HOURS TRANSDERMAL EVERY 24 HOURS
Qty: 28 PATCH | Refills: 2 | Status: SHIPPED | OUTPATIENT
Start: 2023-07-14 | End: 2023-12-08

## 2023-07-14 ASSESSMENT — PAIN SCALES - GENERAL: PAINLEVEL: MODERATE PAIN (4)

## 2023-07-14 NOTE — PROGRESS NOTES
"  Assessment & Plan     ASSESSMENT/ORDERS:    ICD-10-CM    1. NSTEMI (non-ST elevated myocardial infarction) (H)  I21.4       2. Type 2 diabetes mellitus with hyperglycemia, without long-term current use of insulin (H)  E11.65 metFORMIN (GLUCOPHAGE) 1000 MG tablet     AMB Adult Diabetes Educator Referral     FOOT EXAM     Albumin Random Urine Quantitative with Creat Ratio     Albumin Random Urine Quantitative with Creat Ratio      3. Tobacco use disorder  F17.200 nicotine (NICODERM CQ) 21 MG/24HR 24 hr patch        PLAN:  1.  Has cardiology follow-up all lined up  2.  Reviewed diabetes care.  Discussed metformin/glipizide combo.  May want to consider SGLT2 inhibitor due to coronary artery disease.  Patient open to diabetic ed referral.   3.  Patient continues to be nicotine free x9 days.  Patches refilled.           MED REC REQUIRED  Post Medication Reconciliation Status: discharge medications reconciled and changed, per note/orders  BMI:   Estimated body mass index is 30.18 kg/m  as calculated from the following:    Height as of this encounter: 1.765 m (5' 9.5\").    Weight as of this encounter: 94.1 kg (207 lb 6 oz).   Weight management plan: Discussed healthy diet and exercise guidelines        Brice Fowler MD  [facility] MICHAEL Mcgee is a 58 year old, presenting for the following health issues:  Hospital F/U        7/14/2023     9:13 AM   Additional Questions   Roomed by Rosetta RESTREPO   Accompanied by Emily, wife     HPI       Hospital Follow-up Visit:    Hospital/Nursing Home/IP Rehab Facility: [facility]  Date of Admission: 07/06/2023  Date of Discharge: 07/08/2023  Reason(s) for Admission: NSTEMI, Type 2 Diabetes    Was your hospitalization related to COVID-19? No   Problems taking medications regularly:  None  Medication changes since discharge:   START taking these medications     Details   Continuous Blood Gluc Sensor (FREESTYLE CHAIM 14 DAY " SENSOR) MISC Change every 14 days., Disp-2 each, R-5, E-PrescribeFuture refills by PCP Dr. Ricardo Cervantes with phone number [phone].       lisinopril (ZESTRIL) 5 MG tablet Take 1 tablet (5 mg) by mouth daily, Disp-60 tablet, R-0, E-PrescribeFuture refills by PCP Dr. Ricardo Cervantes with phone number [phone].       metoprolol tartrate (LOPRESSOR) 25 MG tablet Take 1 tablet (25 mg) by mouth 2 times daily, Disp-120 tablet, R-0, E-PrescribeFuture refills by PCP Dr. Ricardo Cervantes with phone number [phone].       omeprazole (PRILOSEC OTC) 20 MG EC tablet Take 1 tablet (20 mg) by mouth daily, Disp-30 tablet, R-0, E-PrescribeFuture refills by PCP Dr. Ricardo Cervantes with phone number [phone].       prasugrel (EFFIENT) 10 MG TABS tablet Take 1 tablet (10 mg) by mouth daily, Disp-90 tablet, R-0, E-PrescribeFuture refills by PCP Dr. Ricardo Cervantes with phone number [phone].         CONTINUE these medications which have CHANGED     Details   aspirin 81 MG EC tablet Take 1 tablet (81 mg) by mouth daily Start tomorrow., Disp-90 tablet, R-1, E-PrescribeFuture refills by PCP Dr. Ricardo Cervantes with phone number [phone].       atorvastatin (LIPITOR) 40 MG tablet Take 1 tablet (40 mg) by mouth daily, Disp-90 tablet, R-3, E-PrescribeFuture refills by PCP Dr. Ricardo Cervantes with phone number [phone].       glipiZIDE (GLUCOTROL) 5 MG tablet Take 2 tablets (10 mg) by mouth 2 times daily (before meals) TAKE TWO TABLETS BY MOUTH EVERY MORNING AND TAKE ONE TABLET BY MOUTH IN THE EVENING, Disp-240 tablet, R-3, E-Prescribe       nicotine (NICOTROL) 10 MG inhaler Inhale 1 cartridge as needed for smoking cessation. Maximum of 16 cartridges/day., Disp-6 each, R-0, E-PrescribeFuture refills by PCP Dr. Ricardo Cervantes with phone number [phone].       nitroGLYcerin (NITROSTAT) 0.4 MG sublingual tablet One tablet under the tongue every 5 minutes if needed for chest pain. May repeat every 5  "minutes for a maximum of 3 doses in 15 minutes\", Disp-25 tablet, R-3, E-PrescribeFuture refills by PCP Dr. Ricardo Cervantes with phone number [phone].     STOP taking these medications         ibuprofen (ADVIL/MOTRIN) 200 MG tablet Comments:   Reason for Stopping:                Problems adhering to non-medication therapy:  A little GI upset with new medications that have started    Summary of hospitalization:  [facility] hospital discharge summary reviewed  Diagnostic Tests/Treatments reviewed.  Follow up needed: none  Other Healthcare Providers Involved in Patient s Care:         Specialist appointment - cardiology  Update since discharge: improved.         Plan of care communicated with patient               NSTEMI.  LULY placed.  On Effient (prasugrel) and aspirin.  Cardiac rehab scheduled.    Using patches for nicotine cessation.     Lab Results   Component Value Date    A1C 9.9 07/06/2023    A1C 8.6 08/03/2022    A1C 8.2 03/19/2021    A1C 7.3 09/03/2020    A1C 7.8 10/30/2019    A1C 9.0 04/15/2019    A1C 7.2 05/10/2018       Was not taking metformin and watching diabets well beofre recent events.  Is better at it now.  Also on glipizide prior to hospital.  Has not seen diabetic ed for a while.    Review of Systems         Objective    /78   Pulse 78   Temp 97.1  F (36.2  C) (Temporal)   Resp 20   Ht 1.765 m (5' 9.5\")   Wt 94.1 kg (207 lb 6 oz)   SpO2 100%   BMI 30.18 kg/m    Body mass index is 30.18 kg/m .  Physical Exam  Constitutional:       Appearance: He is well-developed.   Cardiovascular:      Rate and Rhythm: Normal rate and regular rhythm.      Heart sounds: Normal heart sounds, S1 normal and S2 normal. No murmur heard.  Pulmonary:      Effort: Pulmonary effort is normal. No respiratory distress.      Breath sounds: Normal breath sounds. No wheezing, rhonchi or rales.   Musculoskeletal:      Comments: FEET:  monofilament exam normal bilaterally.  Good hair growth.  Dorsalis pedis " pulses 2+ bilaterally.  Feet warm.    Feet:      Right foot:      Skin integrity: No ulcer, blister, skin breakdown or erythema.      Left foot:      Skin integrity: No ulcer, blister, skin breakdown or erythema.   Neurological:      Mental Status: He is alert.

## 2023-07-14 NOTE — RESULT ENCOUNTER NOTE
Everardo,  Your results are normal.  Please let me know if you have any questions.    Sincerely,  Dr. Fowler

## 2023-07-21 ENCOUNTER — HOSPITAL ENCOUNTER (OUTPATIENT)
Dept: CARDIAC REHAB | Facility: CLINIC | Age: 58
Discharge: HOME | End: 2023-07-21
Attending: INTERNAL MEDICINE | Admitting: INTERNAL MEDICINE
Payer: MEDICAID

## 2023-07-21 PROCEDURE — 93798 PHYS/QHP OP CAR RHAB W/ECG: CPT

## 2023-07-26 ENCOUNTER — HOSPITAL ENCOUNTER (OUTPATIENT)
Dept: CARDIAC REHAB | Facility: CLINIC | Age: 58
Discharge: HOME | End: 2023-07-26
Attending: INTERNAL MEDICINE
Payer: MEDICAID

## 2023-07-26 PROCEDURE — 93798 PHYS/QHP OP CAR RHAB W/ECG: CPT

## 2023-08-02 ENCOUNTER — HOSPITAL ENCOUNTER (OUTPATIENT)
Dept: CARDIAC REHAB | Facility: CLINIC | Age: 58
Discharge: HOME | End: 2023-08-02
Attending: INTERNAL MEDICINE
Payer: COMMERCIAL

## 2023-08-02 PROCEDURE — 93798 PHYS/QHP OP CAR RHAB W/ECG: CPT

## 2023-08-04 ENCOUNTER — HOSPITAL ENCOUNTER (OUTPATIENT)
Dept: CARDIAC REHAB | Facility: CLINIC | Age: 58
Discharge: HOME | End: 2023-08-04
Attending: INTERNAL MEDICINE
Payer: COMMERCIAL

## 2023-08-04 PROCEDURE — 93798 PHYS/QHP OP CAR RHAB W/ECG: CPT

## 2023-08-09 ENCOUNTER — HOSPITAL ENCOUNTER (OUTPATIENT)
Dept: CARDIAC REHAB | Facility: CLINIC | Age: 58
Discharge: HOME | End: 2023-08-09
Attending: INTERNAL MEDICINE
Payer: COMMERCIAL

## 2023-08-09 PROCEDURE — 93798 PHYS/QHP OP CAR RHAB W/ECG: CPT

## 2023-08-15 DIAGNOSIS — I21.4 NSTEMI (NON-ST ELEVATED MYOCARDIAL INFARCTION) (H): ICD-10-CM

## 2023-08-15 DIAGNOSIS — E78.5 HYPERLIPIDEMIA LDL GOAL <100: Primary | ICD-10-CM

## 2023-08-15 NOTE — PROGRESS NOTES
Per hospital discharge note, patient was to have BMP in one week after discharge. It does not appear that this was completed. He was also to have a fasting lipid panel in 6 weeks. Both orders were placed. Patient was advised via Mychart to come to appointment about 15 minutes early to have labs drawn prior to appointment and to fast prior to lab draw.     Tammie Post RN   [facility]

## 2023-08-24 ENCOUNTER — OFFICE VISIT (OUTPATIENT)
Dept: CARDIOLOGY | Facility: CLINIC | Age: 58
End: 2023-08-24
Payer: COMMERCIAL

## 2023-08-24 VITALS
HEART RATE: 66 BPM | HEIGHT: 70 IN | WEIGHT: 207.4 LBS | DIASTOLIC BLOOD PRESSURE: 74 MMHG | SYSTOLIC BLOOD PRESSURE: 128 MMHG | RESPIRATION RATE: 16 BRPM | BODY MASS INDEX: 29.69 KG/M2 | OXYGEN SATURATION: 98 %

## 2023-08-24 DIAGNOSIS — E78.5 HYPERLIPIDEMIA LDL GOAL <100: ICD-10-CM

## 2023-08-24 DIAGNOSIS — I21.4 NSTEMI (NON-ST ELEVATED MYOCARDIAL INFARCTION) (H): ICD-10-CM

## 2023-08-24 LAB
ANION GAP SERPL CALCULATED.3IONS-SCNC: 13 MMOL/L (ref 7–15)
BUN SERPL-MCNC: 11.9 MG/DL (ref 6–20)
CALCIUM SERPL-MCNC: 9.2 MG/DL (ref 8.6–10)
CHLORIDE SERPL-SCNC: 103 MMOL/L (ref 98–107)
CHOLEST SERPL-MCNC: 90 MG/DL
CREAT SERPL-MCNC: 0.69 MG/DL (ref 0.67–1.17)
DEPRECATED HCO3 PLAS-SCNC: 21 MMOL/L (ref 22–29)
GFR SERPL CREATININE-BSD FRML MDRD: >90 ML/MIN/1.73M2
GLUCOSE SERPL-MCNC: 175 MG/DL (ref 70–99)
HDLC SERPL-MCNC: 34 MG/DL
LDLC SERPL CALC-MCNC: 43 MG/DL
NONHDLC SERPL-MCNC: 56 MG/DL
POTASSIUM SERPL-SCNC: 4.2 MMOL/L (ref 3.4–5.3)
SODIUM SERPL-SCNC: 137 MMOL/L (ref 136–145)
TRIGL SERPL-MCNC: 63 MG/DL

## 2023-08-24 PROCEDURE — 80048 BASIC METABOLIC PNL TOTAL CA: CPT | Performed by: INTERNAL MEDICINE

## 2023-08-24 PROCEDURE — 36415 COLL VENOUS BLD VENIPUNCTURE: CPT | Performed by: INTERNAL MEDICINE

## 2023-08-24 PROCEDURE — 80061 LIPID PANEL: CPT | Performed by: INTERNAL MEDICINE

## 2023-08-24 PROCEDURE — 99214 OFFICE O/P EST MOD 30 MIN: CPT | Mod: 25 | Performed by: INTERNAL MEDICINE

## 2023-08-24 ASSESSMENT — PAIN SCALES - GENERAL: PAINLEVEL: NO PAIN (0)

## 2023-08-24 NOTE — PROGRESS NOTES
HISTORY OF PRESENT ILLNESS:  Tim Gaona, a 58-year-old man with coronary artery disease, type 2 diabetes mellitus, tobacco use, hypertension, dyslipidemia, and obesity was seen today at the request of his primary care physician Dr. Cervantes, for follow-up of a recent hospitalization at [facility] for non-ST segment elevation MI    The patient developed unstable angina culminating in a non-ST segment ovation MI on 7/6/2023.  Emergency room evaluation showed initial troponin of 113, rising to 133.  ECG was unremarkable.  The patient's echo showed a normal ejection fraction without regional wall motion abnormality (ejection fraction of 55-60) , mild concentric LVH, and no significant valvular stenosis or insufficiency.    The patient was seen in consultation by my colleague  and referred for diagnostic coronary angiography.  The patient's coronary angiogram, which I have personally reviewed showed a  normal LMCA, extensive diffuse atheromatous change in the LAD with a focal subtotal narrowing in the large first diagonal branch, distal occlusion of the small circumflex, and extensive diffuse atheromatous change without focal high-grade narrowing in the dominant right coronary.  The large diagonal branch was felt to be the infarct-related vessel, and the patient underwent successful implantation of a 2.5 x 15 mm length zotarolimus eluting Medtronic NANCI FRONTIER stent with excellent angiographic results.      Since his discharge, the patient remains entirely free of chest arm neck jaw or back discomfort with physical exertion.  He has stopped smoking cigarettes.  He has participated in cardiac rehabilitation with no limitations.  He has now returned back to work and is compliant with medical therapy.    PAST MEDICAL HISTORY  1) coronary artery disease: Presentation with non-ST segment ovation MI.  Ejection fraction of 55 to 60%.  Focal narrowing in first diagonal branch treated with  drug-eluting stent.  Very diffuse atheromatous change in all coronary vessels with distal occlusion of the small circumflex.  2) diabetes mellitus  3) tobacco use  4) Dyslipidemia  5) Hypertension        Orders this Visit:  No orders of the defined types were placed in this encounter.    No orders of the defined types were placed in this encounter.    There are no discontinued medications.    Encounter Diagnoses   Name Primary?    NSTEMI (non-ST elevated myocardial infarction) (H)     Hyperlipidemia LDL goal <100        CURRENT MEDICATIONS:  Current Outpatient Medications   Medication Sig Dispense Refill    Ascorbic Acid (VITAMIN C) 500 MG CAPS Take 1 capsule by mouth daily      aspirin 81 MG EC tablet Take 1 tablet (81 mg) by mouth daily Start tomorrow. 90 tablet 1    atorvastatin (LIPITOR) 40 MG tablet Take 1 tablet (40 mg) by mouth daily 90 tablet 3    B Complex Vitamins (VITAMIN B COMPLEX PO) Take 1 tablet by mouth daily      blood glucose (NO BRAND SPECIFIED) test strip Use to test blood sugars one time daily or as directed 100 strip 11    blood glucose monitoring (ACCU-CHEK MULTICLIX) lancets Use to test blood sugar one time daily or as directed. 100 each 11    blood glucose monitoring (NO BRAND SPECIFIED) meter device kit Use to test blood sugar 2 times daily or as directed. 1 kit 0    Continuous Blood Gluc Sensor (FREESTYLE CHAIM 14 DAY SENSOR) MISC Change every 14 days. 2 each 5    glipiZIDE (GLUCOTROL) 5 MG tablet Take 2 tablets (10 mg) by mouth 2 times daily (before meals) TAKE TWO TABLETS BY MOUTH EVERY MORNING AND TAKE ONE TABLET BY MOUTH IN THE EVENING 240 tablet 3    lisinopril (ZESTRIL) 5 MG tablet Take 1 tablet (5 mg) by mouth daily 60 tablet 0    metFORMIN (GLUCOPHAGE) 1000 MG tablet Take 1 tablet (1,000 mg) by mouth 2 times daily (with meals) 360 tablet 1    metoprolol tartrate (LOPRESSOR) 25 MG tablet Take 1 tablet (25 mg) by mouth 2 times daily 120 tablet 0    nicotine (NICODERM CQ) 21 MG/24HR  "24 hr patch Place 1 patch onto the skin every 24 hours 28 patch 2    prasugrel (EFFIENT) 10 MG TABS tablet Take 1 tablet (10 mg) by mouth daily 90 tablet 0    VITAMIN E PO Take 1 tablet by mouth daily      ACE/ARB/ARNI NOT PRESCRIBED, INTENTIONAL, Please choose reason not prescribed, below (Patient not taking: Reported on 8/24/2023)      nitroGLYcerin (NITROSTAT) 0.4 MG sublingual tablet One tablet under the tongue every 5 minutes if needed for chest pain. May repeat every 5 minutes for a maximum of 3 doses in 15 minutes\" (Patient not taking: Reported on 8/24/2023) 25 tablet 3       ALLERGIES     Allergies   Allergen Reactions    Aleve [Naproxen] Itching     Tolerates ibuprofen       PAST MEDICAL, SURGICAL, FAMILY, SOCIAL HISTORY:  History was reviewed and updated as needed, see medical record.    Review of Systems:  A 12-point review of systems was completed, see medical record for detailed review of systems information.    Physical Exam:  Vitals: /74 (BP Location: Left arm, Patient Position: Sitting, Cuff Size: Adult Large)   Pulse 66   Resp 16   Ht 1.765 m (5' 9.5\")   Wt 94.1 kg (207 lb 6.4 oz)   SpO2 98%   BMI 30.19 kg/m      Does not, cooperative, soft-spoken  Lungs clear to percussion auscultation  Cardiovascular normal S1 normal S2 no S3 no murmur click      ASSESSMENT: The patient is asymptomatic on guideline directed medical therapy for chronic coronary disease.  He should remain on dual antiplatelet therapy for 1 year at which time he can stop prasugrel and continue aspirin 81 daily.  I have encouraged the patient remain abstinent from tobacco, engage in a regular exercise program, and follow a Mediterranean-style diet.  I would recommend that you consider the use of an SGL 2 transport inhibitor which has demonstrated cardiovascular and renal benefits in patients with diabetes.       RECOMMENDATIONS:   1)  Continue  present medication  2)  Mediterranean style diet  3) Regular exercise " program  4) Follow up in one year   5) Consider SGLT2 transport inhibitor      Recent Lab Results:  LIPID RESULTS:  Lab Results   Component Value Date    CHOL 177 07/07/2023    CHOL 164 03/19/2021    HDL 27 (L) 07/07/2023    HDL 31 (L) 03/19/2021    LDL 89 07/07/2023     (H) 03/19/2021    TRIG 304 (H) 07/07/2023    TRIG 91 03/19/2021    CHOLHDLRATIO 7.0 (H) 03/30/2009       LIVER ENZYME RESULTS:  Lab Results   Component Value Date    AST 10 11/18/2017    ALT 20 11/18/2017       CBC RESULTS:  Lab Results   Component Value Date    WBC 7.8 07/06/2023    WBC 12.7 (H) 11/18/2017    RBC 4.85 07/06/2023    RBC 4.45 11/18/2017    HGB 15.0 07/06/2023    HGB 14.0 11/18/2017    HCT 43.8 07/06/2023    HCT 40.8 11/18/2017    MCV 90 07/06/2023    MCV 92 11/18/2017    MCH 30.9 07/06/2023    MCH 31.5 11/18/2017    MCHC 34.2 07/06/2023    MCHC 34.3 11/18/2017    RDW 12.7 07/06/2023    RDW 12.6 11/18/2017     07/06/2023     11/18/2017       BMP RESULTS:  Lab Results   Component Value Date     07/08/2023     09/03/2020    POTASSIUM 4.1 07/08/2023    POTASSIUM 4.2 09/03/2020    CHLORIDE 103 07/08/2023    CHLORIDE 105 09/03/2020    CO2 22 07/08/2023    CO2 25 09/03/2020    ANIONGAP 11 07/08/2023    ANIONGAP 6 09/03/2020     (H) 07/08/2023     (H) 07/08/2023     (H) 09/03/2020    BUN 12.9 07/08/2023    BUN 12 09/03/2020    CR 0.64 (L) 07/08/2023    CR 0.72 09/03/2020    GFRESTIMATED >90 07/08/2023    GFRESTIMATED >90 09/03/2020    GFRESTBLACK >90 09/03/2020    JENNA 9.3 07/08/2023    JENNA 9.3 09/03/2020        A1C RESULTS:  Lab Results   Component Value Date    A1C 9.9 (H) 07/06/2023    A1C 8.2 (H) 03/19/2021       INR RESULTS:  No results found for: INR    We greatly appreciate the opportunity to be involved in the care of your patient, Tim Gaona.    Sincerely,  Nj Boyd MD      CC  No referring provider defined for this encounter.

## 2023-08-24 NOTE — LETTER
8/24/2023    Ricardo Cervantes MD  [address] [place] [address]    RE: Tim Gaona       Dear Colleague,     I had the pleasure of seeing Tim Gaona in the [facility].  HISTORY OF PRESENT ILLNESS:  Stopped smoking.    Orders this Visit:  No orders of the defined types were placed in this encounter.    No orders of the defined types were placed in this encounter.    There are no discontinued medications.    Encounter Diagnoses   Name Primary?    NSTEMI (non-ST elevated myocardial infarction) (H)     Hyperlipidemia LDL goal <100        CURRENT MEDICATIONS:  Current Outpatient Medications   Medication Sig Dispense Refill    Ascorbic Acid (VITAMIN C) 500 MG CAPS Take 1 capsule by mouth daily      aspirin 81 MG EC tablet Take 1 tablet (81 mg) by mouth daily Start tomorrow. 90 tablet 1    atorvastatin (LIPITOR) 40 MG tablet Take 1 tablet (40 mg) by mouth daily 90 tablet 3    B Complex Vitamins (VITAMIN B COMPLEX PO) Take 1 tablet by mouth daily      blood glucose (NO BRAND SPECIFIED) test strip Use to test blood sugars one time daily or as directed 100 strip 11    blood glucose monitoring (ACCU-CHEK MULTICLIX) lancets Use to test blood sugar one time daily or as directed. 100 each 11    blood glucose monitoring (NO BRAND SPECIFIED) meter device kit Use to test blood sugar 2 times daily or as directed. 1 kit 0    Continuous Blood Gluc Sensor (FREESTYLE CHAIM 14 DAY SENSOR) MISC Change every 14 days. 2 each 5    glipiZIDE (GLUCOTROL) 5 MG tablet Take 2 tablets (10 mg) by mouth 2 times daily (before meals) TAKE TWO TABLETS BY MOUTH EVERY MORNING AND TAKE ONE TABLET BY MOUTH IN THE EVENING 240 tablet 3    lisinopril (ZESTRIL) 5 MG tablet Take 1 tablet (5 mg) by mouth daily 60 tablet 0    metFORMIN (GLUCOPHAGE) 1000 MG tablet Take 1 tablet (1,000 mg) by mouth 2 times daily (with meals) 360 tablet 1    metoprolol tartrate (LOPRESSOR) 25 MG tablet Take 1 tablet (25 mg) by mouth 2 times daily 120  "tablet 0    nicotine (NICODERM CQ) 21 MG/24HR 24 hr patch Place 1 patch onto the skin every 24 hours 28 patch 2    prasugrel (EFFIENT) 10 MG TABS tablet Take 1 tablet (10 mg) by mouth daily 90 tablet 0    VITAMIN E PO Take 1 tablet by mouth daily      ACE/ARB/ARNI NOT PRESCRIBED, INTENTIONAL, Please choose reason not prescribed, below (Patient not taking: Reported on 8/24/2023)      nitroGLYcerin (NITROSTAT) 0.4 MG sublingual tablet One tablet under the tongue every 5 minutes if needed for chest pain. May repeat every 5 minutes for a maximum of 3 doses in 15 minutes\" (Patient not taking: Reported on 8/24/2023) 25 tablet 3       ALLERGIES     Allergies   Allergen Reactions    Aleve [Naproxen] Itching     Tolerates ibuprofen       PAST MEDICAL, SURGICAL, FAMILY, SOCIAL HISTORY:  History was reviewed and updated as needed, see medical record.    Review of Systems:  A 12-point review of systems was completed, see medical record for detailed review of systems information.    Physical Exam:  Vitals: /74 (BP Location: Left arm, Patient Position: Sitting, Cuff Size: Adult Large)   Pulse 66   Resp 16   Ht 1.765 m (5' 9.5\")   Wt 94.1 kg (207 lb 6.4 oz)   SpO2 98%   BMI 30.19 kg/m      Constitutional:           Skin:           Head:           Eyes:           ENT:           Neck:           Chest:           Cardiac:                    Abdomen:           Vascular:                                        Extremities and Back:           Neurological:           ASSESSMENT: On GDMT        RECOMMENDATIONS:   1)  Continue  present medication  2)  Mediterranean style diet  3) Regular exercise program  4) Follow up in one year       Recent Lab Results:  LIPID RESULTS:  Lab Results   Component Value Date    CHOL 177 07/07/2023    CHOL 164 03/19/2021    HDL 27 (L) 07/07/2023    HDL 31 (L) 03/19/2021    LDL 89 07/07/2023     (H) 03/19/2021    TRIG 304 (H) 07/07/2023    TRIG 91 03/19/2021    CHOLHDLRATIO 7.0 (H) 03/30/2009 "       LIVER ENZYME RESULTS:  Lab Results   Component Value Date    AST 10 11/18/2017    ALT 20 11/18/2017       CBC RESULTS:  Lab Results   Component Value Date    WBC 7.8 07/06/2023    WBC 12.7 (H) 11/18/2017    RBC 4.85 07/06/2023    RBC 4.45 11/18/2017    HGB 15.0 07/06/2023    HGB 14.0 11/18/2017    HCT 43.8 07/06/2023    HCT 40.8 11/18/2017    MCV 90 07/06/2023    MCV 92 11/18/2017    MCH 30.9 07/06/2023    MCH 31.5 11/18/2017    MCHC 34.2 07/06/2023    MCHC 34.3 11/18/2017    RDW 12.7 07/06/2023    RDW 12.6 11/18/2017     07/06/2023     11/18/2017       BMP RESULTS:  Lab Results   Component Value Date     07/08/2023     09/03/2020    POTASSIUM 4.1 07/08/2023    POTASSIUM 4.2 09/03/2020    CHLORIDE 103 07/08/2023    CHLORIDE 105 09/03/2020    CO2 22 07/08/2023    CO2 25 09/03/2020    ANIONGAP 11 07/08/2023    ANIONGAP 6 09/03/2020     (H) 07/08/2023     (H) 07/08/2023     (H) 09/03/2020    BUN 12.9 07/08/2023    BUN 12 09/03/2020    CR 0.64 (L) 07/08/2023    CR 0.72 09/03/2020    GFRESTIMATED >90 07/08/2023    GFRESTIMATED >90 09/03/2020    GFRESTBLACK >90 09/03/2020    JENNA 9.3 07/08/2023    JENNA 9.3 09/03/2020        A1C RESULTS:  Lab Results   Component Value Date    A1C 9.9 (H) 07/06/2023    A1C 8.2 (H) 03/19/2021       INR RESULTS:  No results found for: INR    We greatly appreciate the opportunity to be involved in the care of your patient, Tim Gaona.    Sincerely,  Nj Boyd MD      CC  No referring provider defined for this encounter.

## 2023-09-09 ENCOUNTER — MYC MEDICAL ADVICE (OUTPATIENT)
Dept: CARDIOLOGY | Facility: CLINIC | Age: 58
End: 2023-09-09
Payer: COMMERCIAL

## 2023-09-09 DIAGNOSIS — E11.9 CONTROLLED TYPE 2 DIABETES MELLITUS WITHOUT COMPLICATION, WITHOUT LONG-TERM CURRENT USE OF INSULIN (H): ICD-10-CM

## 2023-09-09 DIAGNOSIS — I21.4 NSTEMI (NON-ST ELEVATED MYOCARDIAL INFARCTION) (H): ICD-10-CM

## 2023-09-09 DIAGNOSIS — E11.65 TYPE 2 DIABETES MELLITUS WITH HYPERGLYCEMIA, WITHOUT LONG-TERM CURRENT USE OF INSULIN (H): ICD-10-CM

## 2023-09-12 RX ORDER — ATORVASTATIN CALCIUM 40 MG/1
40 TABLET, FILM COATED ORAL DAILY
Qty: 90 TABLET | Refills: 3 | Status: SHIPPED | OUTPATIENT
Start: 2023-09-12

## 2023-09-12 RX ORDER — LISINOPRIL 5 MG/1
5 TABLET ORAL DAILY
Qty: 90 TABLET | Refills: 3 | Status: SHIPPED | OUTPATIENT
Start: 2023-09-12

## 2023-09-12 RX ORDER — PRASUGREL 10 MG/1
10 TABLET, FILM COATED ORAL DAILY
Qty: 90 TABLET | Refills: 3 | Status: SHIPPED | OUTPATIENT
Start: 2023-09-12 | End: 2024-08-26

## 2023-09-12 RX ORDER — METOPROLOL TARTRATE 25 MG/1
25 TABLET, FILM COATED ORAL 2 TIMES DAILY
Qty: 180 TABLET | Refills: 3 | Status: SHIPPED | OUTPATIENT
Start: 2023-09-12

## 2023-09-12 RX ORDER — ASPIRIN 81 MG/1
81 TABLET ORAL DAILY
Qty: 90 TABLET | Refills: 3 | Status: SHIPPED | OUTPATIENT
Start: 2023-09-12

## 2023-10-17 ENCOUNTER — OFFICE VISIT (OUTPATIENT)
Dept: FAMILY MEDICINE | Facility: CLINIC | Age: 58
End: 2023-10-17
Payer: COMMERCIAL

## 2023-10-17 VITALS
HEART RATE: 80 BPM | WEIGHT: 210 LBS | OXYGEN SATURATION: 98 % | RESPIRATION RATE: 16 BRPM | SYSTOLIC BLOOD PRESSURE: 120 MMHG | BODY MASS INDEX: 30.57 KG/M2 | TEMPERATURE: 97.4 F | DIASTOLIC BLOOD PRESSURE: 68 MMHG

## 2023-10-17 DIAGNOSIS — E11.65 TYPE 2 DIABETES MELLITUS WITH HYPERGLYCEMIA, WITHOUT LONG-TERM CURRENT USE OF INSULIN (H): Primary | ICD-10-CM

## 2023-10-17 LAB — HBA1C MFR BLD: 7.6 %

## 2023-10-17 PROCEDURE — 83036 HEMOGLOBIN GLYCOSYLATED A1C: CPT | Performed by: FAMILY MEDICINE

## 2023-10-17 PROCEDURE — 99213 OFFICE O/P EST LOW 20 MIN: CPT | Performed by: FAMILY MEDICINE

## 2023-10-17 PROCEDURE — 36415 COLL VENOUS BLD VENIPUNCTURE: CPT | Performed by: FAMILY MEDICINE

## 2023-10-17 ASSESSMENT — PAIN SCALES - GENERAL: PAINLEVEL: NO PAIN (0)

## 2023-10-17 NOTE — PROGRESS NOTES
Assessment & Plan       ICD-10-CM    1. Type 2 diabetes mellitus with hyperglycemia, without long-term current use of insulin (H)  E11.65 Hemoglobin A1c     Hemoglobin A1c         Historically borderline controlled diabetes.  Lately he has been reducing his carbohydrate and now returns today.  Last seen 3 months ago.  A1c looks dramatically improved.  He is improved his activity.  Compliant with diet.  Feels like he is doing quite well.  Now at goal.  No medication changes today.    Wt Readings from Last 4 Encounters:   10/17/23 95.3 kg (210 lb)   08/24/23 94.1 kg (207 lb 6.4 oz)   07/14/23 94.1 kg (207 lb 6 oz)   07/08/23 93 kg (205 lb 1.6 oz)       Lab Results   Component Value Date    A1C 7.6 10/17/2023    A1C 9.9 07/06/2023    A1C 8.6 08/03/2022    A1C 8.2 03/19/2021    A1C 7.3 09/03/2020    A1C 7.8 10/30/2019    A1C 9.0 04/15/2019    A1C 7.2 05/10/2018        Return in about 2 months (around 12/17/2023).    Ricardo Cervantes MD  [facility]      Parvez Mcgee is a 58 year old, presenting for the following health issues:  Diabetes      History of Present Illness       Diabetes:   He presents for follow up of diabetes.  He is checking home blood glucose four or more times daily.   He checks blood glucose before and after meals.  Blood glucose is sometimes over 200 and never under 70. He is aware of hypoglycemia symptoms including none.    He has no concerns regarding his diabetes at this time.   He is not experiencing numbness or burning in feet, excessive thirst, blurry vision, weight changes or redness, sores or blisters on feet. The patient has not had a diabetic eye exam in the last 12 months.          He eats 2-3 servings of fruits and vegetables daily.He consumes 0 sweetened beverage(s) daily.He exercises with enough effort to increase his heart rate 10 to 19 minutes per day.  He exercises with enough effort to increase his heart rate 5 days per week. He is missing 1 dose(s) of  medications per week.  He is not taking prescribed medications regularly due to remembering to take.                 Review of Systems   Constitutional, HEENT, cardiovascular, pulmonary, gi and gu systems are negative, except as otherwise noted.      Objective    /68 (Cuff Size: Adult Large)   Pulse 80   Temp 97.4  F (36.3  C) (Temporal)   Resp 16   Wt 95.3 kg (210 lb)   SpO2 98%   BMI 30.57 kg/m    Body mass index is 30.57 kg/m .  Physical Exam   GENERAL: healthy, alert and no distress  NECK: no adenopathy, no asymmetry, masses, or scars and thyroid normal to palpation  RESP: lungs clear to auscultation - no rales, rhonchi or wheezes  CV: regular rate and rhythm, normal S1 S2, no S3 or S4, no murmur, click or rub, no peripheral edema and peripheral pulses strong  ABDOMEN: soft, nontender, no hepatosplenomegaly, no masses and bowel sounds normal  MS: no gross musculoskeletal defects noted, no edema

## 2023-11-21 DIAGNOSIS — E11.9 CONTROLLED TYPE 2 DIABETES MELLITUS WITHOUT COMPLICATION, WITHOUT LONG-TERM CURRENT USE OF INSULIN (H): ICD-10-CM

## 2023-11-21 RX ORDER — BLOOD-GLUCOSE METER
EACH MISCELLANEOUS
Qty: 1 KIT | Refills: 0 | Status: SHIPPED | OUTPATIENT
Start: 2023-11-21

## 2023-12-08 DIAGNOSIS — F17.200 TOBACCO USE DISORDER: ICD-10-CM

## 2023-12-08 RX ORDER — NICOTINE 21 MG/24HR
PATCH, TRANSDERMAL 24 HOURS TRANSDERMAL
Qty: 28 PATCH | Refills: 2 | Status: SHIPPED | OUTPATIENT
Start: 2023-12-08 | End: 2024-02-23

## 2024-02-23 DIAGNOSIS — F17.200 TOBACCO USE DISORDER: ICD-10-CM

## 2024-02-23 RX ORDER — NICOTINE 21 MG/24HR
PATCH, TRANSDERMAL 24 HOURS TRANSDERMAL
Qty: 28 PATCH | Refills: 2 | Status: SHIPPED | OUTPATIENT
Start: 2024-02-23

## 2024-06-01 DIAGNOSIS — E11.65 TYPE 2 DIABETES MELLITUS WITH HYPERGLYCEMIA, WITHOUT LONG-TERM CURRENT USE OF INSULIN (H): ICD-10-CM

## 2024-06-04 ENCOUNTER — MYC REFILL (OUTPATIENT)
Dept: FAMILY MEDICINE | Facility: CLINIC | Age: 59
End: 2024-06-04
Payer: COMMERCIAL

## 2024-06-04 DIAGNOSIS — E11.65 TYPE 2 DIABETES MELLITUS WITH HYPERGLYCEMIA, WITHOUT LONG-TERM CURRENT USE OF INSULIN (H): ICD-10-CM

## 2024-06-04 NOTE — TELEPHONE ENCOUNTER
Patient has been notified of the note below via [employer]. Reminder set for 3 days to send letter if not read.

## 2024-06-04 NOTE — TELEPHONE ENCOUNTER
Ricardo Cervantes MD   to [facility]      6/4/24  7:56 AM  Not sure who his pcp is. I refilled. I asked him to come back in 2 mos, that was 7 mos ago. Needs recheck DM

## 2024-06-10 DIAGNOSIS — E11.65 TYPE 2 DIABETES MELLITUS WITH HYPERGLYCEMIA, WITHOUT LONG-TERM CURRENT USE OF INSULIN (H): ICD-10-CM

## 2024-06-11 RX ORDER — GLIPIZIDE 5 MG/1
10 TABLET ORAL
Qty: 240 TABLET | Refills: 3 | Status: SHIPPED | OUTPATIENT
Start: 2024-06-11

## 2024-06-11 NOTE — TELEPHONE ENCOUNTER
Ricardo Cervantes MD   to [facility]      6/11/24  5:00 PM  Filled. Needs appt for med check, not urgent

## 2024-07-18 ENCOUNTER — TELEPHONE (OUTPATIENT)
Dept: FAMILY MEDICINE | Facility: CLINIC | Age: 59
End: 2024-07-18
Payer: COMMERCIAL

## 2024-07-18 NOTE — TELEPHONE ENCOUNTER
Patient Quality Outreach    Patient is due for the following:   Diabetes -  A1C, Microalbumin, and Foot Exam  Depression  -  PHQ-9 needed  Physical Preventive Adult Physical    Next Steps:   Schedule a Adult Preventative    Type of outreach:    Sent [employer] message.      Questions for provider review:    None           Patricia Hernandez MA  Chart routed to Care Team.

## 2024-08-21 ENCOUNTER — OFFICE VISIT (OUTPATIENT)
Dept: CARDIOLOGY | Facility: CLINIC | Age: 59
End: 2024-08-21
Attending: INTERNAL MEDICINE
Payer: COMMERCIAL

## 2024-08-21 VITALS
SYSTOLIC BLOOD PRESSURE: 126 MMHG | BODY MASS INDEX: 30.14 KG/M2 | HEART RATE: 81 BPM | WEIGHT: 210.5 LBS | HEIGHT: 70 IN | RESPIRATION RATE: 18 BRPM | DIASTOLIC BLOOD PRESSURE: 74 MMHG | OXYGEN SATURATION: 98 %

## 2024-08-21 DIAGNOSIS — E78.5 HYPERLIPIDEMIA LDL GOAL <100: ICD-10-CM

## 2024-08-21 DIAGNOSIS — I21.4 NSTEMI (NON-ST ELEVATED MYOCARDIAL INFARCTION) (H): ICD-10-CM

## 2024-08-21 PROCEDURE — 99214 OFFICE O/P EST MOD 30 MIN: CPT | Performed by: INTERNAL MEDICINE

## 2024-08-21 ASSESSMENT — PAIN SCALES - GENERAL: PAINLEVEL: NO PAIN (0)

## 2024-08-21 NOTE — LETTER
8/21/2024    Ricardo Cervantes MD  [address] [place] [address]    RE: Tim Gaona       Dear Colleague,     I had the pleasure of seeing Tim Gaona in the [facility].  HISTORY OF PRESENT ILLNESS:  Tim Gaona a 59 year old male with coronary artery disease, type 2 diabetes mellitus, tobacco use, hypertension, dyslipidemia, and obesity was seen today at the request of his primary care physician Dr. Cervantes for follow up    No  symptoms  enjoys cruising       PAST MEDICAL HISTORY  1) coronary artery disease: Presentation with non-ST segment ovation MI.  Ejection fraction of 55 to 60%.  Focal narrowing in first diagonal branch treated with drug-eluting stent.  Very diffuse atheromatous change in all coronary vessels with distal occlusion of the small circumflex.  2) diabetes mellitus  3) tobacco use  4) Dyslipidemia  5) Hypertension  Orders this Visit:  No orders of the defined types were placed in this encounter.    No orders of the defined types were placed in this encounter.    There are no discontinued medications.    Encounter Diagnoses   Name Primary?     NSTEMI (non-ST elevated myocardial infarction) (H)      Hyperlipidemia LDL goal <100        CURRENT MEDICATIONS:  Current Outpatient Medications   Medication Sig Dispense Refill     Ascorbic Acid (VITAMIN C) 500 MG CAPS Take 1 capsule by mouth daily       aspirin 81 MG EC tablet Take 1 tablet (81 mg) by mouth daily Start tomorrow. 90 tablet 3     atorvastatin (LIPITOR) 40 MG tablet Take 1 tablet (40 mg) by mouth daily 90 tablet 3     B Complex Vitamins (VITAMIN B COMPLEX PO) Take 1 tablet by mouth daily       blood glucose (NO BRAND SPECIFIED) test strip Use to test blood sugars one time daily or as directed 100 strip 11     blood glucose monitoring (ACCU-CHEK MULTICLIX) lancets Use to test blood sugar one time daily or as directed. 100 each 11     Blood Glucose Monitoring Suppl (ACCU-CHEK GUIDE) w/Device KIT USE TO TEST  "BLOOD SURGAR TWO TIMES A DAY OR AS DIRECTED 1 kit 0     Continuous Blood Gluc Sensor (FREESTYLE CHAIM 14 DAY SENSOR) MISC Change every 14 days. 2 each 5     glipiZIDE (GLUCOTROL) 5 MG tablet Take 2 tablets (10 mg) by mouth 2 times daily (before meals) TAKE TWO TABLETS BY MOUTH EVERY MORNING AND TAKE ONE TABLET BY MOUTH IN THE EVENING 240 tablet 3     lisinopril (ZESTRIL) 5 MG tablet Take 1 tablet (5 mg) by mouth daily 90 tablet 3     metFORMIN (GLUCOPHAGE) 1000 MG tablet TAKE ONE TABLET BY MOUTH TWO TIMES A DAY WITH MEALS 360 tablet 0     metoprolol tartrate (LOPRESSOR) 25 MG tablet Take 1 tablet (25 mg) by mouth 2 times daily 180 tablet 3     nicotine (NICODERM CQ) 21 MG/24HR 24 hr patch APPLY ONE PATCH TO THE SKIN EVERY 24 HOURS 28 patch 2     prasugrel (EFFIENT) 10 MG TABS tablet Take 1 tablet (10 mg) by mouth daily 90 tablet 3     VITAMIN E PO Take 1 tablet by mouth daily       ACE/ARB/ARNI NOT PRESCRIBED, INTENTIONAL, Please choose reason not prescribed, below (Patient not taking: Reported on 8/24/2023)       nitroGLYcerin (NITROSTAT) 0.4 MG sublingual tablet One tablet under the tongue every 5 minutes if needed for chest pain. May repeat every 5 minutes for a maximum of 3 doses in 15 minutes\" (Patient not taking: Reported on 8/24/2023) 25 tablet 3       ALLERGIES     Allergies   Allergen Reactions     Aleve [Naproxen] Itching     Tolerates ibuprofen       PAST MEDICAL, SURGICAL, FAMILY, SOCIAL HISTORY:  History was reviewed and updated as needed, see medical record.        Review of Systems:  A 12-point review of systems was completed, see medical record for detailed review of systems information.    Physical Exam:  Vitals: /74 (BP Location: Left arm, Patient Position: Sitting, Cuff Size: Adult Regular)   Pulse 81   Resp 18   Ht 1.765 m (5' 9.5\")   Wt 95.5 kg (210 lb 8 oz)   SpO2 98%   BMI 30.64 kg/m      Constitutional: No apparent distress    HEENT: pupils equal round reactive to light, thyroid " normal size, JVP is normal    Chest: Clear to percussion and auscultation    Cardiac: Normal S1, normal S2 no S3, no murmur or click    Abdomen: Scaphoid.  Liver percusses to 6 cm spleen is not palpable aorta is not tender or enlarged    Extremitiies: no edema.    Neurological:  Cranial nerves II through XII are intact, strength equal and symmetrical, displays normal insight and judgment        ASSESSMENT: Tim Gaona is a 59 year old male with          We reviewed the benefits of a regular exercise program, a Mediterranean-style weight loss diet, and contacting us for any changes in between now and the time of her next visit.     RECOMMENDATIONS:   1) Mediterranean style weight loss  diet    2) Regular  exercise  program  3) consider SGLT 2 transport inhibitor  4) continue present medications    5)  follow up  in one year        Recent Lab Results:  LIPID RESULTS:  Lab Results   Component Value Date    CHOL 90 08/24/2023    CHOL 164 03/19/2021    HDL 34 (L) 08/24/2023    HDL 31 (L) 03/19/2021    LDL 43 08/24/2023     (H) 03/19/2021    TRIG 63 08/24/2023    TRIG 91 03/19/2021    CHOLHDLRATIO 7.0 (H) 03/30/2009       LIVER ENZYME RESULTS:  Lab Results   Component Value Date    AST 10 11/18/2017    ALT 20 11/18/2017       CBC RESULTS:  Lab Results   Component Value Date    WBC 7.8 07/06/2023    WBC 12.7 (H) 11/18/2017    RBC 4.85 07/06/2023    RBC 4.45 11/18/2017    HGB 15.0 07/06/2023    HGB 14.0 11/18/2017    HCT 43.8 07/06/2023    HCT 40.8 11/18/2017    MCV 90 07/06/2023    MCV 92 11/18/2017    MCH 30.9 07/06/2023    MCH 31.5 11/18/2017    MCHC 34.2 07/06/2023    MCHC 34.3 11/18/2017    RDW 12.7 07/06/2023    RDW 12.6 11/18/2017     07/06/2023     11/18/2017       BMP RESULTS:  Lab Results   Component Value Date     08/24/2023     09/03/2020    POTASSIUM 4.2 08/24/2023    POTASSIUM 4.2 09/03/2020    CHLORIDE 103 08/24/2023    CHLORIDE 105 09/03/2020    CO2 21 (L) 08/24/2023     "CO2 25 09/03/2020    ANIONGAP 13 08/24/2023    ANIONGAP 6 09/03/2020     (H) 08/24/2023     (H) 07/08/2023     (H) 09/03/2020    BUN 11.9 08/24/2023    BUN 12 09/03/2020    CR 0.69 08/24/2023    CR 0.72 09/03/2020    GFRESTIMATED >90 08/24/2023    GFRESTIMATED >90 09/03/2020    GFRESTBLACK >90 09/03/2020    JENNA 9.2 08/24/2023    JENNA 9.3 09/03/2020        A1C RESULTS:  Lab Results   Component Value Date    A1C 7.6 (H) 10/17/2023    A1C 8.2 (H) 03/19/2021       INR RESULTS:  No results found for: \"INR\"    We greatly appreciate the opportunity to be involved in the care of your patient, Tim Gaona.    Sincerely,  Nj Boyd MD      CC  Nj Boyd MD  [address]  [place] [id number]                                                                       Thank you for allowing me to participate in the care of your patient.      Sincerely,     Nj Boyd MD     [facility]  cc:   Nj Boyd MD  [address]  [place] [id number]      "

## 2024-08-21 NOTE — PROGRESS NOTES
HISTORY OF PRESENT ILLNESS:  Tim Gaona a 59 year old male with coronary artery disease, type 2 diabetes mellitus, tobacco use, hypertension, dyslipidemia, and obesity was seen today at the request of his primary care physician Dr. Cervantes for follow up    Since last being seen in August 2023, the patient remains asymptomatic.  He specifically denies chest pain dyspnea syncope fatigue or change in exercise tolerance.  He enjoys hunting and fishing in the summer and recently went on a cruise to [place].  He continues to work and has several family members that live nearby.    PAST MEDICAL HISTORY  1) coronary artery disease: Presentation with non-ST segment ovation MI.  Ejection fraction of 55 to 60%.  Focal narrowing in first diagonal branch treated with drug-eluting stent.  Very diffuse atheromatous change in all coronary vessels with distal occlusion of the small circumflex.  2) diabetes mellitus  3) tobacco use  4) Dyslipidemia  5) Hypertension  Orders this Visit:  No orders of the defined types were placed in this encounter.    No orders of the defined types were placed in this encounter.    There are no discontinued medications.    Encounter Diagnoses   Name Primary?    NSTEMI (non-ST elevated myocardial infarction) (H)     Hyperlipidemia LDL goal <100        CURRENT MEDICATIONS:  Current Outpatient Medications   Medication Sig Dispense Refill    Ascorbic Acid (VITAMIN C) 500 MG CAPS Take 1 capsule by mouth daily      aspirin 81 MG EC tablet Take 1 tablet (81 mg) by mouth daily Start tomorrow. 90 tablet 3    atorvastatin (LIPITOR) 40 MG tablet Take 1 tablet (40 mg) by mouth daily 90 tablet 3    B Complex Vitamins (VITAMIN B COMPLEX PO) Take 1 tablet by mouth daily      blood glucose (NO BRAND SPECIFIED) test strip Use to test blood sugars one time daily or as directed 100 strip 11    blood glucose monitoring (ACCU-CHEK MULTICLIX) lancets Use to test blood sugar one time daily or as directed. 100 each 11     "Blood Glucose Monitoring Suppl (ACCU-CHEK GUIDE) w/Device KIT USE TO TEST BLOOD SURGAR TWO TIMES A DAY OR AS DIRECTED 1 kit 0    Continuous Blood Gluc Sensor (FREESTYLE CHAIM 14 DAY SENSOR) MISC Change every 14 days. 2 each 5    glipiZIDE (GLUCOTROL) 5 MG tablet Take 2 tablets (10 mg) by mouth 2 times daily (before meals) TAKE TWO TABLETS BY MOUTH EVERY MORNING AND TAKE ONE TABLET BY MOUTH IN THE EVENING 240 tablet 3    lisinopril (ZESTRIL) 5 MG tablet Take 1 tablet (5 mg) by mouth daily 90 tablet 3    metFORMIN (GLUCOPHAGE) 1000 MG tablet TAKE ONE TABLET BY MOUTH TWO TIMES A DAY WITH MEALS 360 tablet 0    metoprolol tartrate (LOPRESSOR) 25 MG tablet Take 1 tablet (25 mg) by mouth 2 times daily 180 tablet 3    nicotine (NICODERM CQ) 21 MG/24HR 24 hr patch APPLY ONE PATCH TO THE SKIN EVERY 24 HOURS 28 patch 2    prasugrel (EFFIENT) 10 MG TABS tablet Take 1 tablet (10 mg) by mouth daily 90 tablet 3    VITAMIN E PO Take 1 tablet by mouth daily      ACE/ARB/ARNI NOT PRESCRIBED, INTENTIONAL, Please choose reason not prescribed, below (Patient not taking: Reported on 8/24/2023)      nitroGLYcerin (NITROSTAT) 0.4 MG sublingual tablet One tablet under the tongue every 5 minutes if needed for chest pain. May repeat every 5 minutes for a maximum of 3 doses in 15 minutes\" (Patient not taking: Reported on 8/24/2023) 25 tablet 3       ALLERGIES     Allergies   Allergen Reactions    Aleve [Naproxen] Itching     Tolerates ibuprofen       PAST MEDICAL, SURGICAL, FAMILY, SOCIAL HISTORY:  History was reviewed and updated as needed, see medical record.        Review of Systems:  A 12-point review of systems was completed, see medical record for detailed review of systems information.    Physical Exam:  Vitals: /74 (BP Location: Left arm, Patient Position: Sitting, Cuff Size: Adult Regular)   Pulse 81   Resp 18   Ht 1.765 m (5' 9.5\")   Wt 95.5 kg (210 lb 8 oz)   SpO2 98%   BMI 30.64 kg/m      Constitutional: No apparent " distress    HEENT: pupils equal round reactive to light, thyroid normal size, JVP is normal    Chest: Clear to percussion and auscultation    Cardiac: Normal S1, normal S2 no S3, no murmur or click            ASSESSMENT: The patient is asymptomatic on guideline directed medical therapy with optimal blood pressure and LDL cholesterol levels.  We reviewed the benefit of a regular exercise program and a Mediterranean-style diet.  As I mentioned during the patient's last visit, I have asked him to discuss whether he is a candidate for an SGL 2 transport inhibitor  ( such as empagliflozin or dapagliflozin) with his primary care provider.    RECOMMENDATIONS:   1) Mediterranean style weight loss  diet    2) Regular  exercise  program  3) consider SGLT 2 transport inhibitor  4) continue present medications    5)  follow up  in one year        Recent Lab Results:  LIPID RESULTS:  Lab Results   Component Value Date    CHOL 90 08/24/2023    CHOL 164 03/19/2021    HDL 34 (L) 08/24/2023    HDL 31 (L) 03/19/2021    LDL 43 08/24/2023     (H) 03/19/2021    TRIG 63 08/24/2023    TRIG 91 03/19/2021    CHOLHDLRATIO 7.0 (H) 03/30/2009       LIVER ENZYME RESULTS:  Lab Results   Component Value Date    AST 10 11/18/2017    ALT 20 11/18/2017       CBC RESULTS:  Lab Results   Component Value Date    WBC 7.8 07/06/2023    WBC 12.7 (H) 11/18/2017    RBC 4.85 07/06/2023    RBC 4.45 11/18/2017    HGB 15.0 07/06/2023    HGB 14.0 11/18/2017    HCT 43.8 07/06/2023    HCT 40.8 11/18/2017    MCV 90 07/06/2023    MCV 92 11/18/2017    MCH 30.9 07/06/2023    MCH 31.5 11/18/2017    MCHC 34.2 07/06/2023    MCHC 34.3 11/18/2017    RDW 12.7 07/06/2023    RDW 12.6 11/18/2017     07/06/2023     11/18/2017       BMP RESULTS:  Lab Results   Component Value Date     08/24/2023     09/03/2020    POTASSIUM 4.2 08/24/2023    POTASSIUM 4.2 09/03/2020    CHLORIDE 103 08/24/2023    CHLORIDE 105 09/03/2020    CO2 21 (L) 08/24/2023     "CO2 25 09/03/2020    ANIONGAP 13 08/24/2023    ANIONGAP 6 09/03/2020     (H) 08/24/2023     (H) 07/08/2023     (H) 09/03/2020    BUN 11.9 08/24/2023    BUN 12 09/03/2020    CR 0.69 08/24/2023    CR 0.72 09/03/2020    GFRESTIMATED >90 08/24/2023    GFRESTIMATED >90 09/03/2020    GFRESTBLACK >90 09/03/2020    JENNA 9.2 08/24/2023    JENNA 9.3 09/03/2020        A1C RESULTS:  Lab Results   Component Value Date    A1C 7.6 (H) 10/17/2023    A1C 8.2 (H) 03/19/2021       INR RESULTS:  No results found for: \"INR\"    We greatly appreciate the opportunity to be involved in the care of your patient, Tim Gaona.    Sincerely,  Nj Boyd MD        Nj Boyd MD  [address]  [place] [id number]                                                                     "

## 2024-08-26 DIAGNOSIS — I21.4 NSTEMI (NON-ST ELEVATED MYOCARDIAL INFARCTION) (H): ICD-10-CM

## 2024-08-26 RX ORDER — PRASUGREL 10 MG/1
10 TABLET, FILM COATED ORAL DAILY
Qty: 90 TABLET | Refills: 3 | Status: SHIPPED | OUTPATIENT
Start: 2024-08-26

## 2024-10-05 DIAGNOSIS — E11.65 TYPE 2 DIABETES MELLITUS WITH HYPERGLYCEMIA, WITHOUT LONG-TERM CURRENT USE OF INSULIN (H): ICD-10-CM

## 2024-10-07 RX ORDER — LANCETS
EACH MISCELLANEOUS
Qty: 100 EACH | Refills: 0 | Status: SHIPPED | OUTPATIENT
Start: 2024-10-07

## 2024-10-29 DIAGNOSIS — I21.4 NSTEMI (NON-ST ELEVATED MYOCARDIAL INFARCTION) (H): ICD-10-CM

## 2024-10-29 RX ORDER — METOPROLOL TARTRATE 25 MG/1
25 TABLET, FILM COATED ORAL 2 TIMES DAILY
Qty: 180 TABLET | Refills: 3 | Status: SHIPPED | OUTPATIENT
Start: 2024-10-29

## 2024-10-29 RX ORDER — LISINOPRIL 5 MG/1
5 TABLET ORAL DAILY
Qty: 90 TABLET | Refills: 3 | Status: SHIPPED | OUTPATIENT
Start: 2024-10-29

## 2024-10-29 RX ORDER — ASPIRIN 81 MG/1
81 TABLET ORAL DAILY
Qty: 90 TABLET | Refills: 3 | Status: SHIPPED | OUTPATIENT
Start: 2024-10-29

## 2024-10-29 RX ORDER — ATORVASTATIN CALCIUM 40 MG/1
40 TABLET, FILM COATED ORAL DAILY
Qty: 90 TABLET | Refills: 3 | Status: SHIPPED | OUTPATIENT
Start: 2024-10-29

## 2025-03-06 DIAGNOSIS — E11.65 TYPE 2 DIABETES MELLITUS WITH HYPERGLYCEMIA, WITHOUT LONG-TERM CURRENT USE OF INSULIN (H): ICD-10-CM

## 2025-03-06 NOTE — TELEPHONE ENCOUNTER
Glipizide: Clinic RN: Please investigate patient's chart or contact patient if the information cannot be found because the medication is listed as historical or discontinued. Confirm patient is taking this medication. Document findings and route refill encounter to provider for approval or denial.

## 2025-03-06 NOTE — TELEPHONE ENCOUNTER
RN TRIAGE CALL:    Patient Contact    Attempt # 1    Was call answered?  No.  Left message on voicemail with information to call me back.    MERT BakerN, RN

## 2025-03-07 NOTE — TELEPHONE ENCOUNTER
Last visit with PCP 10/17/23. Last refill encounter 1/2/25 provider filled and advised needs follow up. Patient has not had follow-up yet.     Called and spoke with patient. Reviewed scripts with patient. Patient reports to taking medications as prescribed. Has enough on hand until this coming Wednesday. Patient states blood sugars have been stable. Reports if he eats right - pretty good. Has to watch what he eats. BG average 150 per patient.    Did advise he is due for a follow-up with PCP for medication management. Offered to assist in scheduling. Patient verbalizes understanding, states he and spouse do not have health insurance at this time that is why he has not scheduled follow-up. He is in the process of getting health insurance so he can schedule follow-up.    Verified preferred pharmacy. Did advise patient to call clinic back if not heard response by early next week. Will route to PCP for review.     Isela Walters RN on 3/7/2025 at 10:42 AM

## 2025-03-12 RX ORDER — GLIPIZIDE 5 MG/1
TABLET ORAL
Qty: 90 TABLET | Refills: 2 | Status: SHIPPED | OUTPATIENT
Start: 2025-03-12

## 2025-04-07 DIAGNOSIS — E11.65 TYPE 2 DIABETES MELLITUS WITH HYPERGLYCEMIA, WITHOUT LONG-TERM CURRENT USE OF INSULIN (H): ICD-10-CM

## 2025-04-08 RX ORDER — FLASH GLUCOSE SENSOR
KIT MISCELLANEOUS
Qty: 2 EACH | Refills: 5 | Status: SHIPPED | OUTPATIENT
Start: 2025-04-08

## 2025-04-10 ENCOUNTER — MYC MEDICAL ADVICE (OUTPATIENT)
Dept: FAMILY MEDICINE | Facility: CLINIC | Age: 60
End: 2025-04-10
Payer: COMMERCIAL

## 2025-04-10 NOTE — TELEPHONE ENCOUNTER
Patient has been notified of the note below via [employer]. Reminder set for 3 days to send letter if not read.

## 2025-04-10 NOTE — LETTER
April 17, 2025      Tim Gaona  [address] [facility] [address]        Dear Tim,     We are concerned about your health care.  We recently provided you with a medication refill.  Many medications require routine follow-up with your Doctor.       At this time we ask that: You schedule a routine office visit with your physician to follow your medications.  Call the clinic at [phone] to schedule.      Your prescription:  Has been filled. Please schedule a follow up visit for first available appointment. Courtesy refills will be given if needed until your scheduled appointment.         Thank you   [facility] Care Team  [phone]

## 2025-05-12 DIAGNOSIS — E11.65 TYPE 2 DIABETES MELLITUS WITH HYPERGLYCEMIA, WITHOUT LONG-TERM CURRENT USE OF INSULIN (H): ICD-10-CM

## 2025-05-31 ENCOUNTER — PATIENT OUTREACH (OUTPATIENT)
Dept: CARE COORDINATION | Facility: CLINIC | Age: 60
End: 2025-05-31
Payer: COMMERCIAL

## 2025-06-26 DIAGNOSIS — E11.65 TYPE 2 DIABETES MELLITUS WITH HYPERGLYCEMIA, WITHOUT LONG-TERM CURRENT USE OF INSULIN (H): ICD-10-CM

## 2025-06-26 RX ORDER — GLIPIZIDE 5 MG/1
TABLET ORAL
Qty: 90 TABLET | Refills: 2 | OUTPATIENT
Start: 2025-06-26

## 2025-07-01 DIAGNOSIS — E11.65 TYPE 2 DIABETES MELLITUS WITH HYPERGLYCEMIA, WITHOUT LONG-TERM CURRENT USE OF INSULIN (H): ICD-10-CM

## 2025-07-02 RX ORDER — GLIPIZIDE 5 MG/1
TABLET ORAL
Qty: 90 TABLET | Refills: 0 | Status: SHIPPED | OUTPATIENT
Start: 2025-07-02

## 2025-07-02 NOTE — TELEPHONE ENCOUNTER
Patient has appointment on 7/17. Okay to fill per PCP.    Rajinder Clayton RN on 7/2/2025 at 10:13 AM

## 2025-07-17 ENCOUNTER — VIRTUAL VISIT (OUTPATIENT)
Dept: FAMILY MEDICINE | Facility: CLINIC | Age: 60
End: 2025-07-17
Payer: COMMERCIAL

## 2025-07-17 DIAGNOSIS — E11.65 TYPE 2 DIABETES MELLITUS WITH HYPERGLYCEMIA, WITHOUT LONG-TERM CURRENT USE OF INSULIN (H): ICD-10-CM

## 2025-07-17 DIAGNOSIS — Z12.11 SCREEN FOR COLON CANCER: Primary | ICD-10-CM

## 2025-07-17 PROCEDURE — 99207 PR NON-BILLABLE SERV PER CHARTING: CPT | Mod: 95 | Performed by: FAMILY MEDICINE

## 2025-07-17 RX ORDER — GLIPIZIDE 10 MG/1
10 TABLET ORAL
Qty: 180 TABLET | Refills: 3 | Status: SHIPPED | OUTPATIENT
Start: 2025-07-17

## 2025-07-17 NOTE — PROGRESS NOTES
"Everardo is a 60 year old who is being evaluated via a billable telephone visit.    How would you like to obtain your AVS? MyChart  If the video visit is dropped, the invitation should be resent by: Text to cell phone: [phone]  Will anyone else be joining your video visit? No      Assessment & Plan     Assessment & Plan       ICD-10-CM    1. Screen for colon cancer  Z12.11       2. Type 2 diabetes mellitus with hyperglycemia, without long-term current use of insulin (H)  E11.65 Adult Eye  Referral     glipiZIDE (GLUCOTROL) 10 MG tablet     Hemoglobin A1c     Basic metabolic panel  (Ca, Cl, CO2, Creat, Gluc, K, Na, BUN)           Blood sugars do not sound controlled.  Refills provided.  Check labs with further recommendation to follow.    No follow-ups on file.    Ricardo Cervantes MD  [facility]     BMI  Estimated body mass index is 30.64 kg/m  as calculated from the following:    Height as of 8/21/24: 1.765 m (5' 9.5\").    Weight as of 8/21/24: 95.5 kg (210 lb 8 oz).           Subjective   Everardo is a 60 year old, presenting for the following health issues:  Follow Up and No Show        7/17/2025     2:05 PM   Additional Questions   Roomed by Rebecca RAYA         Doing well  BP at home 120s  Wt stable  Due for labs  Quit smoking  Blood sugars - 180s in am, 120s afternoon, 1-3 times a day      Review of Systems  Constitutional, HEENT, cardiovascular, pulmonary, gi and gu systems are negative, except as otherwise noted.      Objective           Vitals:  No vitals were obtained today due to virtual visit.    Physical Exam   GENERAL: alert and no distress  EYES: Eyes grossly normal to inspection.  No discharge or erythema, or obvious scleral/conjunctival abnormalities.  RESP: No audible wheeze, cough, or visible cyanosis.    SKIN: Visible skin clear. No significant rash, abnormal pigmentation or lesions.  NEURO: Cranial nerves grossly intact.  Mentation and speech appropriate for age.  PSYCH: " Appropriate affect, tone, and pace of words      This patient was a no show for this scheduled appointment.

## 2025-07-21 ENCOUNTER — PATIENT OUTREACH (OUTPATIENT)
Dept: CARE COORDINATION | Facility: CLINIC | Age: 60
End: 2025-07-21
Payer: COMMERCIAL

## 2025-07-21 DIAGNOSIS — E11.65 TYPE 2 DIABETES MELLITUS WITH HYPERGLYCEMIA, WITHOUT LONG-TERM CURRENT USE OF INSULIN (H): ICD-10-CM

## (undated) DEVICE — NDL PERC ENTRY THINWALL 18GA 7.0" G00166

## (undated) DEVICE — SU MONOCRYL 3-0 PS-2 18" UND Y497G

## (undated) DEVICE — KIT HAND CONTROL ANGIOTOUCH ACIST 65CM AT-P65

## (undated) DEVICE — INTRO SHEATH 4FRX10CM PINNACLE RSS402

## (undated) DEVICE — BLADE CLIPPER 4406

## (undated) DEVICE — ESU SUCTION/IRRIGATION SYSTEM PISTOL GRIP

## (undated) DEVICE — GLOVE PROTEXIS W/NEU-THERA 6.0  2D73TE60

## (undated) DEVICE — DEVICE SUTURE GRASPER TROCAR CLOSURE 14GA PMITCSG

## (undated) DEVICE — PACK LAPAROSCOPY/PELVISCOPY STD

## (undated) DEVICE — CLIP APPLIER ENDO 05MM MED/LG 176630

## (undated) DEVICE — ENDO TROCAR 05/11MM VERSAPORT V2 179095PF

## (undated) DEVICE — CATH LAUNCHER 6FR LA6EBU375

## (undated) DEVICE — CATH BALLOON NC EMERGE 2.25X12MM H7493926712220

## (undated) DEVICE — ENDO POUCH 5X9" 15MM ENDOCATCH II 173049

## (undated) DEVICE — MANIFOLD KIT ANGIO AUTOMATED 014613

## (undated) DEVICE — CATH ANGIO INFINITI JR4 4FRX100CM 538421

## (undated) DEVICE — ENDO TROCAR BLADELESS 05X100MM B5LT

## (undated) DEVICE — INTRODUCER SHEATH GREEN 6.5FRX11CM .038IN PSI-6F-11-038ACT

## (undated) DEVICE — LINE MONITOR NASAL SMART CAPNOLINE ADULT LONG 12463

## (undated) DEVICE — ENDO CANNULA 05MM VERSAONE UNIVERSAL UNVCA5STF

## (undated) DEVICE — DEFIB PRO-PADZ LVP LQD GEL ADULT 8900-2105-01

## (undated) DEVICE — SU VICRYL 0 UR-6 27" J603H

## (undated) DEVICE — BLADE KNIFE SURG 15 371115

## (undated) DEVICE — INFL DVC BASIXCOMPAK PLYCRB 30 ATM 13IN 20ML IN4530

## (undated) DEVICE — CATH BALLOON NC EMERGE 2.50X12MM H7493926712250

## (undated) DEVICE — ESU HOOK TIP 5MM CONMED

## (undated) DEVICE — GUIDEWIRE VASC 0.014INX190CM J TIP CGRXT190HJ

## (undated) DEVICE — NDL INSUFFLATION 120MM VERRES 172015

## (undated) DEVICE — SOL NACL 0.9% INJ 1000ML BAG 07983-09

## (undated) DEVICE — BASIN SET MINOR DISP

## (undated) DEVICE — SU DERMABOND ADVANCED .7ML DNX12

## (undated) DEVICE — GLOVE ESTEEM BLUE W/NEU-THERA 6.0  2D73PB60

## (undated) DEVICE — TOTE ANGIO CORP PC15AT SAN32CC83O

## (undated) DEVICE — CATH DIAG 4FR JL 4.5 538417

## (undated) RX ORDER — PROPOFOL 10 MG/ML
INJECTION, EMULSION INTRAVENOUS
Status: DISPENSED
Start: 2017-11-19

## (undated) RX ORDER — FENTANYL CITRATE 50 UG/ML
INJECTION, SOLUTION INTRAMUSCULAR; INTRAVENOUS
Status: DISPENSED
Start: 2017-11-19

## (undated) RX ORDER — HEPARIN SODIUM 1000 [USP'U]/ML
INJECTION, SOLUTION INTRAVENOUS; SUBCUTANEOUS
Status: DISPENSED
Start: 2023-07-07

## (undated) RX ORDER — DEXAMETHASONE SODIUM PHOSPHATE 10 MG/ML
INJECTION INTRAMUSCULAR; INTRAVENOUS
Status: DISPENSED
Start: 2017-11-19

## (undated) RX ORDER — LABETALOL HYDROCHLORIDE 5 MG/ML
INJECTION, SOLUTION INTRAVENOUS
Status: DISPENSED
Start: 2017-11-19

## (undated) RX ORDER — GLYCOPYRROLATE 0.2 MG/ML
INJECTION INTRAMUSCULAR; INTRAVENOUS
Status: DISPENSED
Start: 2017-11-19

## (undated) RX ORDER — PRASUGREL 10 MG/1
TABLET, FILM COATED ORAL
Status: DISPENSED
Start: 2023-07-07

## (undated) RX ORDER — LIDOCAINE HYDROCHLORIDE 20 MG/ML
INJECTION, SOLUTION EPIDURAL; INFILTRATION; INTRACAUDAL; PERINEURAL
Status: DISPENSED
Start: 2017-11-19

## (undated) RX ORDER — DEXMEDETOMIDINE HYDROCHLORIDE 100 UG/ML
INJECTION, SOLUTION INTRAVENOUS
Status: DISPENSED
Start: 2017-11-19

## (undated) RX ORDER — FENTANYL CITRATE 50 UG/ML
INJECTION, SOLUTION INTRAMUSCULAR; INTRAVENOUS
Status: DISPENSED
Start: 2023-07-07

## (undated) RX ORDER — PHENYLEPHRINE HCL IN 0.9% NACL 1 MG/10 ML
SYRINGE (ML) INTRAVENOUS
Status: DISPENSED
Start: 2017-11-19

## (undated) RX ORDER — HYDROMORPHONE HYDROCHLORIDE 1 MG/ML
INJECTION, SOLUTION INTRAMUSCULAR; INTRAVENOUS; SUBCUTANEOUS
Status: DISPENSED
Start: 2017-11-19

## (undated) RX ORDER — LIDOCAINE HYDROCHLORIDE 10 MG/ML
INJECTION, SOLUTION EPIDURAL; INFILTRATION; INTRACAUDAL; PERINEURAL
Status: DISPENSED
Start: 2023-07-07

## (undated) RX ORDER — NITROGLYCERIN 5 MG/ML
VIAL (ML) INTRAVENOUS
Status: DISPENSED
Start: 2023-07-07

## (undated) RX ORDER — NEOSTIGMINE METHYLSULFATE 1 MG/ML
VIAL (ML) INJECTION
Status: DISPENSED
Start: 2017-11-19

## (undated) RX ORDER — ONDANSETRON 2 MG/ML
INJECTION INTRAMUSCULAR; INTRAVENOUS
Status: DISPENSED
Start: 2017-11-19

## (undated) RX ORDER — HEPARIN SODIUM 200 [USP'U]/100ML
INJECTION, SOLUTION INTRAVENOUS
Status: DISPENSED
Start: 2023-07-07

## (undated) RX ORDER — ACETAMINOPHEN 10 MG/ML
INJECTION, SOLUTION INTRAVENOUS
Status: DISPENSED
Start: 2017-11-19